# Patient Record
Sex: MALE | Race: OTHER | Employment: FULL TIME | URBAN - METROPOLITAN AREA
[De-identification: names, ages, dates, MRNs, and addresses within clinical notes are randomized per-mention and may not be internally consistent; named-entity substitution may affect disease eponyms.]

---

## 2017-01-25 ENCOUNTER — TELEPHONE (OUTPATIENT)
Dept: INTERNAL MEDICINE CLINIC | Facility: CLINIC | Age: 67
End: 2017-01-25

## 2017-01-25 DIAGNOSIS — E11.9 TYPE 2 DIABETES MELLITUS WITHOUT COMPLICATION, WITHOUT LONG-TERM CURRENT USE OF INSULIN (HCC): Primary | ICD-10-CM

## 2017-01-25 NOTE — TELEPHONE ENCOUNTER
Pt asked that I speak with his wife. Per wife, she states pt's fasting blood sugar has been 150-160 for the past few weeks. I also spoke with the patient, he states his normal fasting is around 130, he states he is asymptomatic.   He would like some labs

## 2017-01-26 NOTE — TELEPHONE ENCOUNTER
I spoke to the pt's wife. She states that the pt's work schedule has changed and he is off on Fridays now. First available appt on Fri is not until the end of Feb and wife is concerned that pt needs to be seen sooner.   I tentatively booked pt at 02/03 (f

## 2017-01-27 NOTE — TELEPHONE ENCOUNTER
Wife was contacted and appt was confirmed for Friday 02/03 at 9:40am at the [facility]

## 2017-02-01 ENCOUNTER — LAB ENCOUNTER (OUTPATIENT)
Dept: LAB | Facility: HOSPITAL | Age: 67
End: 2017-02-01
Attending: INTERNAL MEDICINE
Payer: COMMERCIAL

## 2017-02-01 DIAGNOSIS — K76.0 FATTY LIVER: ICD-10-CM

## 2017-02-01 LAB
ALBUMIN SERPL BCP-MCNC: 4.2 G/DL (ref 3.5–4.8)
ALBUMIN/GLOB SERPL: 1.3 {RATIO} (ref 1–2)
ALP SERPL-CCNC: 53 U/L (ref 32–100)
ALT SERPL-CCNC: 70 U/L (ref 17–63)
ANION GAP SERPL CALC-SCNC: 10 MMOL/L (ref 0–18)
AST SERPL-CCNC: 64 U/L (ref 15–41)
BILIRUB DIRECT SERPL-MCNC: 0.1 MG/DL (ref 0–0.2)
BILIRUB SERPL-MCNC: 1.2 MG/DL (ref 0.3–1.2)
BUN SERPL-MCNC: 11 MG/DL (ref 8–20)
BUN/CREAT SERPL: 12.4 (ref 10–20)
CALCIUM SERPL-MCNC: 9.3 MG/DL (ref 8.5–10.5)
CHLORIDE SERPL-SCNC: 102 MMOL/L (ref 95–110)
CO2 SERPL-SCNC: 26 MMOL/L (ref 22–32)
CREAT SERPL-MCNC: 0.89 MG/DL (ref 0.5–1.5)
GLOBULIN PLAS-MCNC: 3.2 G/DL (ref 2.5–3.7)
GLUCOSE SERPL-MCNC: 165 MG/DL (ref 70–99)
HBA1C MFR BLD: 6.6 % (ref 4–6)
OSMOLALITY UR CALC.SUM OF ELEC: 289 MOSM/KG (ref 275–295)
POTASSIUM SERPL-SCNC: 4.2 MMOL/L (ref 3.3–5.1)
PROT SERPL-MCNC: 7.4 G/DL (ref 5.9–8.4)
SODIUM SERPL-SCNC: 138 MMOL/L (ref 136–144)

## 2017-02-01 PROCEDURE — 82248 BILIRUBIN DIRECT: CPT

## 2017-02-01 PROCEDURE — 36415 COLL VENOUS BLD VENIPUNCTURE: CPT

## 2017-02-01 PROCEDURE — 83036 HEMOGLOBIN GLYCOSYLATED A1C: CPT | Performed by: INTERNAL MEDICINE

## 2017-02-01 PROCEDURE — 80053 COMPREHEN METABOLIC PANEL: CPT | Performed by: INTERNAL MEDICINE

## 2017-02-03 ENCOUNTER — OFFICE VISIT (OUTPATIENT)
Dept: INTERNAL MEDICINE CLINIC | Facility: CLINIC | Age: 67
End: 2017-02-03

## 2017-02-03 VITALS
SYSTOLIC BLOOD PRESSURE: 118 MMHG | DIASTOLIC BLOOD PRESSURE: 78 MMHG | TEMPERATURE: 98 F | HEART RATE: 80 BPM | BODY MASS INDEX: 22.57 KG/M2 | RESPIRATION RATE: 18 BRPM | WEIGHT: 157.63 LBS | HEIGHT: 70 IN

## 2017-02-03 DIAGNOSIS — I10 ESSENTIAL HYPERTENSION WITH GOAL BLOOD PRESSURE LESS THAN 140/90: ICD-10-CM

## 2017-02-03 DIAGNOSIS — E11.9 TYPE 2 DIABETES MELLITUS WITHOUT COMPLICATION, WITHOUT LONG-TERM CURRENT USE OF INSULIN (HCC): Primary | ICD-10-CM

## 2017-02-03 DIAGNOSIS — R74.8 ELEVATED LIVER ENZYMES: ICD-10-CM

## 2017-02-03 PROCEDURE — 99214 OFFICE O/P EST MOD 30 MIN: CPT | Performed by: INTERNAL MEDICINE

## 2017-02-03 PROCEDURE — 99212 OFFICE O/P EST SF 10 MIN: CPT | Performed by: INTERNAL MEDICINE

## 2017-02-03 RX ORDER — LISINOPRIL 10 MG/1
10 TABLET ORAL
Qty: 90 TABLET | Refills: 3 | Status: SHIPPED | OUTPATIENT
Start: 2017-02-03 | End: 2017-05-22

## 2017-02-03 RX ORDER — GLIPIZIDE 5 MG/1
5 TABLET, FILM COATED, EXTENDED RELEASE ORAL 2 TIMES DAILY
Qty: 180 TABLET | Refills: 3 | Status: SHIPPED | OUTPATIENT
Start: 2017-02-03 | End: 2017-05-22

## 2017-02-03 NOTE — PROGRESS NOTES
HPI:    Patient ID: Maren Juarez is a 78year old male. HPI  Patient here for follow-up on chronic medical issues.   Seen in November for earwax removal.  He was seen in September for general physical exam.  Blood sugars have been somewhat more elev Negative for joint pain. Skin: Negative for rash. Neurological: Negative for weakness, numbness and headaches. Hematological: Does not bruise/bleed easily. Psychiatric/Behavioral: Negative for depressed mood. The patient is not nervous/anxious. Abdominal: Soft. Bowel sounds are normal. He exhibits no mass. There is no tenderness. Musculoskeletal: He exhibits no tenderness. Lymphadenopathy:     He has no cervical adenopathy. Neurological: He is alert. Skin: Skin is warm and dry.  No rash

## 2017-02-18 ENCOUNTER — TELEPHONE (OUTPATIENT)
Dept: INTERNAL MEDICINE CLINIC | Facility: CLINIC | Age: 67
End: 2017-02-18

## 2017-02-18 DIAGNOSIS — E11.9 TYPE 2 DIABETES MELLITUS WITHOUT COMPLICATION, WITHOUT LONG-TERM CURRENT USE OF INSULIN (HCC): Primary | ICD-10-CM

## 2017-02-18 NOTE — TELEPHONE ENCOUNTER
----- Message from Tia Hernandez MD sent at 2/18/2017  9:00 AM CST -----  Regarding: Notification of Unviewed Test Results  Contact: [phone]  Please call. Patient has not viewed the results and message sent on [employer].  Please go over the results and

## 2017-02-18 NOTE — TELEPHONE ENCOUNTER
Spoke with patient's wife who is on HIPPA (identified name and ), results reviewed and agrees with plan. Will check on the results more often    Spoke with patien's wife who is on HIPPA (identified name and ), results reviewed and agrees with plan.

## 2017-02-22 NOTE — TELEPHONE ENCOUNTER
Please contact the patient. Referral placed for dietitian to discuss diabetes and fatty liver. Probably best done through the diabetes education center.

## 2017-03-10 ENCOUNTER — OFFICE VISIT (OUTPATIENT)
Dept: NUTRITION | Facility: HOSPITAL | Age: 67
End: 2017-03-10
Attending: INTERNAL MEDICINE
Payer: COMMERCIAL

## 2017-03-10 DIAGNOSIS — E11.9 TYPE 2 DIABETES MELLITUS WITHOUT COMPLICATION, WITHOUT LONG-TERM CURRENT USE OF INSULIN (HCC): ICD-10-CM

## 2017-03-10 DIAGNOSIS — E11.9 TYPE 2 DIABETES MELLITUS (HCC): Primary | ICD-10-CM

## 2017-03-10 PROCEDURE — 97802 MEDICAL NUTRITION INDIV IN: CPT

## 2017-03-10 NOTE — PROGRESS NOTES
ADULT INITIAL OUTPATIENT NUTRITION CONSULTATION    Nutrition Assessment    Medical Diagnosis: Diabetes    PMH: Diabetes    Client Hx: 78year old    Meds: reviewed    Labs (date): Glucose    ANTHROPOMETRICS:  Ht: 5' 10\"  Wt: 157    BMI: 22.61    Current

## 2017-05-02 ENCOUNTER — TELEPHONE (OUTPATIENT)
Dept: INTERNAL MEDICINE CLINIC | Facility: CLINIC | Age: 67
End: 2017-05-02

## 2017-05-02 DIAGNOSIS — E11.8 TYPE 2 DIABETES MELLITUS WITH COMPLICATION, UNSPECIFIED LONG TERM INSULIN USE STATUS: Primary | ICD-10-CM

## 2017-05-02 NOTE — TELEPHONE ENCOUNTER
Per pt's wife, wants Monday appt with Dr. Yojana Duong for 3 month follow up visit for diabetes and fatty liver. Offered 6/26, pt's wife declined, stated needed appt sooner.

## 2017-05-05 NOTE — TELEPHONE ENCOUNTER
Wife called to follow up,   Scheduled appt for 5/22  Wife requesting order for routine labs ( A1C, kidney, Lipid panel)

## 2017-05-09 NOTE — TELEPHONE ENCOUNTER
Spoke with patient's wife advised her lab order in system. Wife advised patient will need to fast. Appoint confirmed on 5/22/17 at 11:40am with Marta Jimenez. Wife voiced her understanding of this.

## 2017-05-22 ENCOUNTER — APPOINTMENT (OUTPATIENT)
Dept: LAB | Facility: HOSPITAL | Age: 67
End: 2017-05-22
Attending: INTERNAL MEDICINE
Payer: COMMERCIAL

## 2017-05-22 ENCOUNTER — OFFICE VISIT (OUTPATIENT)
Dept: INTERNAL MEDICINE CLINIC | Facility: CLINIC | Age: 67
End: 2017-05-22

## 2017-05-22 VITALS
BODY MASS INDEX: 22.24 KG/M2 | SYSTOLIC BLOOD PRESSURE: 102 MMHG | WEIGHT: 155.31 LBS | DIASTOLIC BLOOD PRESSURE: 68 MMHG | RESPIRATION RATE: 18 BRPM | HEART RATE: 74 BPM | HEIGHT: 70 IN | TEMPERATURE: 98 F

## 2017-05-22 DIAGNOSIS — E11.8 TYPE 2 DIABETES MELLITUS WITH COMPLICATION, UNSPECIFIED LONG TERM INSULIN USE STATUS: ICD-10-CM

## 2017-05-22 DIAGNOSIS — G89.29 CHRONIC RIGHT SHOULDER PAIN: ICD-10-CM

## 2017-05-22 DIAGNOSIS — R74.8 ELEVATED LIVER ENZYMES: ICD-10-CM

## 2017-05-22 DIAGNOSIS — E11.8 TYPE 2 DIABETES MELLITUS WITH COMPLICATION, UNSPECIFIED LONG TERM INSULIN USE STATUS: Primary | ICD-10-CM

## 2017-05-22 DIAGNOSIS — M25.511 CHRONIC RIGHT SHOULDER PAIN: ICD-10-CM

## 2017-05-22 DIAGNOSIS — H92.02 LEFT EAR PAIN: ICD-10-CM

## 2017-05-22 DIAGNOSIS — I10 ESSENTIAL HYPERTENSION WITH GOAL BLOOD PRESSURE LESS THAN 140/90: ICD-10-CM

## 2017-05-22 DIAGNOSIS — K76.0 FATTY LIVER: ICD-10-CM

## 2017-05-22 DIAGNOSIS — D23.5 BENIGN NEOPLASM OF SKIN OF TRUNK: ICD-10-CM

## 2017-05-22 PROCEDURE — 99212 OFFICE O/P EST SF 10 MIN: CPT | Performed by: INTERNAL MEDICINE

## 2017-05-22 PROCEDURE — 99214 OFFICE O/P EST MOD 30 MIN: CPT | Performed by: INTERNAL MEDICINE

## 2017-05-22 PROCEDURE — 80053 COMPREHEN METABOLIC PANEL: CPT

## 2017-05-22 PROCEDURE — 83036 HEMOGLOBIN GLYCOSYLATED A1C: CPT

## 2017-05-22 PROCEDURE — 36415 COLL VENOUS BLD VENIPUNCTURE: CPT

## 2017-05-22 PROCEDURE — 80061 LIPID PANEL: CPT

## 2017-05-22 RX ORDER — LISINOPRIL 10 MG/1
10 TABLET ORAL
Qty: 90 TABLET | Refills: 3 | Status: SHIPPED | OUTPATIENT
Start: 2017-05-22 | End: 2018-04-17

## 2017-05-22 RX ORDER — GLIPIZIDE 5 MG/1
5 TABLET, FILM COATED, EXTENDED RELEASE ORAL 2 TIMES DAILY
Qty: 180 TABLET | Refills: 3 | Status: SHIPPED | OUTPATIENT
Start: 2017-05-22 | End: 2018-04-17

## 2017-05-22 NOTE — PROGRESS NOTES
HPI:    Patient ID: Ricky Storm is a 78year old male. HPI  Patient is here for follow-up on chronic medical issues as listed below. Last seen here 3 months ago.   Recent issues with increased liver function test.  Negative testing for hepatitis a Cardiovascular: Negative for chest pain and palpitations. Gastrointestinal: Negative for nausea, vomiting, abdominal pain, diarrhea and constipation. Genitourinary: Negative for dysuria, hematuria and sexual dysfunction.    Musculoskeletal: Positive f clear and moist.   Eyes: Pupils are equal, round, and reactive to light. Cardiovascular: Normal rate, regular rhythm, normal heart sounds and intact distal pulses. Exam reveals no gallop and no friction rub. No murmur heard. Edema not present.   Pul of the trunk. Refer to dermatology for evaluation and possible removal.    (H92.02) Left ear pain  Plan: Intermittent ear pain. Exam is normal.  Monitor for now.       Meds This Visit:  Pending Prescriptions Disp Refills    MetFORMIN HCl 1000 MG Oral Tab

## 2017-06-02 ENCOUNTER — TELEPHONE (OUTPATIENT)
Dept: INTERNAL MEDICINE CLINIC | Facility: CLINIC | Age: 67
End: 2017-06-02

## 2017-06-02 NOTE — TELEPHONE ENCOUNTER
Patient and wife contacted and informed of results as in patient result comments entered by [employer] on 5/29/17. Copy of results mailed to patients home address listed in EMR.

## 2017-06-26 ENCOUNTER — APPOINTMENT (OUTPATIENT)
Dept: LAB | Facility: HOSPITAL | Age: 67
End: 2017-06-26
Attending: DERMATOLOGY
Payer: COMMERCIAL

## 2017-06-26 ENCOUNTER — APPOINTMENT (OUTPATIENT)
Dept: LAB | Age: 67
End: 2017-06-26
Attending: DERMATOLOGY
Payer: COMMERCIAL

## 2017-06-26 ENCOUNTER — OFFICE VISIT (OUTPATIENT)
Dept: DERMATOLOGY CLINIC | Facility: CLINIC | Age: 67
End: 2017-06-26

## 2017-06-26 DIAGNOSIS — D23.5 BENIGN NEOPLASM OF SKIN OF TRUNK, EXCEPT SCROTUM: ICD-10-CM

## 2017-06-26 DIAGNOSIS — D23.30 BENIGN NEOPLASM OF SKIN OF FACE: ICD-10-CM

## 2017-06-26 DIAGNOSIS — D23.4 BENIGN NEOPLASM OF SCALP AND SKIN OF NECK: ICD-10-CM

## 2017-06-26 DIAGNOSIS — L82.1 SEBORRHEIC KERATOSES: ICD-10-CM

## 2017-06-26 DIAGNOSIS — D48.5 NEOPLASM OF UNCERTAIN BEHAVIOR OF SKIN: Primary | ICD-10-CM

## 2017-06-26 PROCEDURE — 11100 BIOPSY OF SKIN LESION: CPT | Performed by: DERMATOLOGY

## 2017-06-26 PROCEDURE — 88305 TISSUE EXAM BY PATHOLOGIST: CPT

## 2017-06-26 PROCEDURE — 99202 OFFICE O/P NEW SF 15 MIN: CPT | Performed by: DERMATOLOGY

## 2017-06-29 NOTE — PROGRESS NOTES
The pathology report from last visit showed    Seb ker. irritated. Please log in test results, send biopsy results letter. Pt to rtc 1 year or prn.

## 2017-07-16 NOTE — PROGRESS NOTES
Donnie Cardenas is a 78year old male. HPI:     CC:  Patient presents with:  Lesion: New pt presenting with lesion to L flank. Pt denies personal and family hx of skin cancer.         Allergies:  Penicillins    HISTORY:    Past Medical History:   Diagnos Unknown    Past Medical History:   Diagnosis Date   • Type II or unspecified type diabetes mellitus without mention of complication, not stated as uncontrolled      History reviewed. No pertinent surgical history.     Social History  Social History scattered on exam. pigmented lesions examined with dermoscopy benign-appearing patterns. Waxy tannish keratotic papules scattered, cherry-red vascular papules scattered. See map today's date for lesions noted .   1.5 cm crusted dark brown inflamed pl

## 2017-08-21 ENCOUNTER — TELEPHONE (OUTPATIENT)
Dept: INTERNAL MEDICINE CLINIC | Facility: CLINIC | Age: 67
End: 2017-08-21

## 2017-08-21 DIAGNOSIS — Z12.5 SCREENING FOR PROSTATE CANCER: Primary | ICD-10-CM

## 2017-08-21 DIAGNOSIS — E11.9 TYPE 2 DIABETES MELLITUS WITHOUT COMPLICATION, UNSPECIFIED LONG TERM INSULIN USE STATUS: ICD-10-CM

## 2017-08-21 NOTE — TELEPHONE ENCOUNTER
Pt's wife asking to speak with a nurse regarding diabetic lab orders and the reason why pt had to cancel today's appointment. Please advise.

## 2017-08-21 NOTE — TELEPHONE ENCOUNTER
PATIENT/WIFE CONTACTED REQUESTING ORDERS FOR LAB PRIOR TO 10/16/17 FOLLOW UP APPOINTMENT WITH YOU, PLEASE ADVISE.

## 2017-09-12 ENCOUNTER — TELEPHONE (OUTPATIENT)
Dept: INTERNAL MEDICINE CLINIC | Facility: CLINIC | Age: 67
End: 2017-09-12

## 2017-09-12 NOTE — TELEPHONE ENCOUNTER
Wife stts pt is out of test strips.       Glucose Blood (ONETOUCH ULTRA BLUE) In Vitro Strip Test by Intradermal route 2 times every day Disp: 100 strip Rfl: 5

## 2017-10-02 ENCOUNTER — LAB ENCOUNTER (OUTPATIENT)
Dept: LAB | Facility: HOSPITAL | Age: 67
End: 2017-10-02
Attending: INTERNAL MEDICINE
Payer: COMMERCIAL

## 2017-10-02 DIAGNOSIS — Z12.5 SCREENING FOR PROSTATE CANCER: ICD-10-CM

## 2017-10-02 DIAGNOSIS — E11.9 TYPE 2 DIABETES MELLITUS WITHOUT COMPLICATION, UNSPECIFIED LONG TERM INSULIN USE STATUS: ICD-10-CM

## 2017-10-02 PROCEDURE — 82607 VITAMIN B-12: CPT

## 2017-10-02 PROCEDURE — 85025 COMPLETE CBC W/AUTO DIFF WBC: CPT

## 2017-10-02 PROCEDURE — 80061 LIPID PANEL: CPT

## 2017-10-02 PROCEDURE — 82570 ASSAY OF URINE CREATININE: CPT

## 2017-10-02 PROCEDURE — 80053 COMPREHEN METABOLIC PANEL: CPT

## 2017-10-02 PROCEDURE — 82043 UR ALBUMIN QUANTITATIVE: CPT

## 2017-10-02 PROCEDURE — 83036 HEMOGLOBIN GLYCOSYLATED A1C: CPT

## 2017-10-02 PROCEDURE — 84443 ASSAY THYROID STIM HORMONE: CPT

## 2017-10-02 PROCEDURE — 36415 COLL VENOUS BLD VENIPUNCTURE: CPT

## 2017-10-16 ENCOUNTER — OFFICE VISIT (OUTPATIENT)
Dept: INTERNAL MEDICINE CLINIC | Facility: CLINIC | Age: 67
End: 2017-10-16

## 2017-10-16 ENCOUNTER — APPOINTMENT (OUTPATIENT)
Dept: LAB | Facility: HOSPITAL | Age: 67
End: 2017-10-16
Attending: INTERNAL MEDICINE
Payer: COMMERCIAL

## 2017-10-16 VITALS
BODY MASS INDEX: 21.86 KG/M2 | SYSTOLIC BLOOD PRESSURE: 104 MMHG | RESPIRATION RATE: 18 BRPM | HEART RATE: 84 BPM | HEIGHT: 70 IN | TEMPERATURE: 97 F | DIASTOLIC BLOOD PRESSURE: 64 MMHG | WEIGHT: 152.69 LBS

## 2017-10-16 DIAGNOSIS — E11.9 TYPE 2 DIABETES MELLITUS WITHOUT COMPLICATION, WITHOUT LONG-TERM CURRENT USE OF INSULIN (HCC): ICD-10-CM

## 2017-10-16 DIAGNOSIS — R74.8 ELEVATED LIVER ENZYMES: ICD-10-CM

## 2017-10-16 DIAGNOSIS — E11.9 TYPE 2 DIABETES MELLITUS WITHOUT COMPLICATION, WITHOUT LONG-TERM CURRENT USE OF INSULIN (HCC): Primary | ICD-10-CM

## 2017-10-16 DIAGNOSIS — I10 ESSENTIAL HYPERTENSION WITH GOAL BLOOD PRESSURE LESS THAN 140/90: ICD-10-CM

## 2017-10-16 DIAGNOSIS — R79.89 ABNORMAL THYROID BLOOD TEST: ICD-10-CM

## 2017-10-16 PROCEDURE — 84439 ASSAY OF FREE THYROXINE: CPT

## 2017-10-16 PROCEDURE — 36415 COLL VENOUS BLD VENIPUNCTURE: CPT

## 2017-10-16 PROCEDURE — 99214 OFFICE O/P EST MOD 30 MIN: CPT | Performed by: INTERNAL MEDICINE

## 2017-10-16 PROCEDURE — 84481 FREE ASSAY (FT-3): CPT

## 2017-10-16 PROCEDURE — 99212 OFFICE O/P EST SF 10 MIN: CPT | Performed by: INTERNAL MEDICINE

## 2017-10-16 NOTE — PROGRESS NOTES
HPI:    Patient ID: Paloma Grandchild is a 78year old male. HPI  Patient is here for follow-up on chronic medical problems as listed below. Last seen here in May 22. Overall he was doing well at that time.   He had some mild increase in liver function Positive for cough. Negative for shortness of breath. Cardiovascular: Negative for chest pain and palpitations. Gastrointestinal: Positive for constipation. Negative for nausea, vomiting, abdominal pain and diarrhea.    Genitourinary: Negative for dysu Pupils are equal, round, and reactive to light. Cardiovascular: Normal rate, regular rhythm, normal heart sounds and intact distal pulses. Exam reveals no gallop and no friction rub. No murmur heard. Edema not present.   Pulmonary/Chest: Effort norm not apply Misc 100 each 5      Sig: test by intradermal route twice a day           Imaging & Referrals:  None         #[id number]

## 2017-12-27 ENCOUNTER — OFFICE VISIT (OUTPATIENT)
Dept: OPHTHALMOLOGY | Facility: CLINIC | Age: 67
End: 2017-12-27

## 2017-12-27 ENCOUNTER — TELEPHONE (OUTPATIENT)
Dept: OPHTHALMOLOGY | Facility: CLINIC | Age: 67
End: 2017-12-27

## 2017-12-27 DIAGNOSIS — H11.31 SUBCONJUNCTIVAL HEMORRHAGE OF RIGHT EYE: Primary | ICD-10-CM

## 2017-12-27 PROCEDURE — 99213 OFFICE O/P EST LOW 20 MIN: CPT | Performed by: OPHTHALMOLOGY

## 2017-12-27 PROCEDURE — 99212 OFFICE O/P EST SF 10 MIN: CPT | Performed by: OPHTHALMOLOGY

## 2017-12-27 NOTE — TELEPHONE ENCOUNTER
OV booked at 1:00 today with [facility]. Pt woke up in the middle of the night with right eye pain and redness. Pt states that the pain subsided but still has slight redness. Pt would like to be seen today.

## 2017-12-27 NOTE — TELEPHONE ENCOUNTER
Pts spouse states pt woke up with R eye very red today, states pain has gone away but is concerned, asking if pt can be seen today. Pls advise thank you.

## 2017-12-27 NOTE — PATIENT INSTRUCTIONS
Subconjunctival hemorrhage of right eye  Discussed diagnosis with patient. No treatment is needed at this time. Patient was reassured that there is no scratch, infection, foreign body or inflammation in the eye.   Told  patient that this may take up to  1

## 2017-12-27 NOTE — PROGRESS NOTES
Gustavo East is a 75year old male. HPI:     HPI     Pt called today stating that he woke up in the middle of the night due to right eye pain 10/10 and redness OD.  Pt states that today his pain is less 2/10 OD and the redness has gotten worse nasal Strip Test blood sugar twice a day. Disp: 200 each Rfl: 0   Aspirin (ASPIR-81 OR) Take 1 tablet by mouth daily. Disp:  Rfl:    Blood Glucose Monitoring Suppl ([address]   [address]) W/DEVICE Does not apply Kit apply 1 by Misc. (Non-Drug; Combo Route) route, information  given. No orders of the defined types were placed in this encounter. Meds This Visit:    No prescriptions requested or ordered in this encounter     Follow up instructions:  Return for N/A  Diabetic Eye Exam with Dr. Sherrie Chin.     12/

## 2017-12-27 NOTE — ASSESSMENT & PLAN NOTE
Discussed diagnosis with patient. No treatment is needed at this time. Patient was reassured that there is no scratch, infection, foreign body or inflammation in the eye. Told  patient that this may take up to  1-2 weeks to resolve.   Patient denies any

## 2018-03-24 ENCOUNTER — TELEPHONE (OUTPATIENT)
Dept: INTERNAL MEDICINE CLINIC | Facility: CLINIC | Age: 68
End: 2018-03-24

## 2018-03-24 DIAGNOSIS — E11.9 TYPE 2 DIABETES MELLITUS WITHOUT COMPLICATION, WITHOUT LONG-TERM CURRENT USE OF INSULIN (HCC): Primary | ICD-10-CM

## 2018-03-24 DIAGNOSIS — R79.89 ABNORMAL THYROID BLOOD TEST: ICD-10-CM

## 2018-03-24 NOTE — TELEPHONE ENCOUNTER
Pt's wife called in requesting to have pt's 6 month f/u lab orders placed on to his chart prior to his appt scheduled for 4/17.

## 2018-04-01 ENCOUNTER — APPOINTMENT (OUTPATIENT)
Dept: LAB | Facility: HOSPITAL | Age: 68
End: 2018-04-01
Attending: INTERNAL MEDICINE
Payer: COMMERCIAL

## 2018-04-01 DIAGNOSIS — E11.9 TYPE 2 DIABETES MELLITUS WITHOUT COMPLICATION, WITHOUT LONG-TERM CURRENT USE OF INSULIN (HCC): ICD-10-CM

## 2018-04-01 DIAGNOSIS — R79.89 ABNORMAL THYROID BLOOD TEST: ICD-10-CM

## 2018-04-01 PROCEDURE — 84439 ASSAY OF FREE THYROXINE: CPT

## 2018-04-01 PROCEDURE — 84443 ASSAY THYROID STIM HORMONE: CPT

## 2018-04-01 PROCEDURE — 36415 COLL VENOUS BLD VENIPUNCTURE: CPT

## 2018-04-01 PROCEDURE — 80061 LIPID PANEL: CPT

## 2018-04-01 PROCEDURE — 84481 FREE ASSAY (FT-3): CPT

## 2018-04-01 PROCEDURE — 83036 HEMOGLOBIN GLYCOSYLATED A1C: CPT

## 2018-04-01 PROCEDURE — 82947 ASSAY GLUCOSE BLOOD QUANT: CPT

## 2018-04-14 ENCOUNTER — OFFICE VISIT (OUTPATIENT)
Dept: OPTOMETRY | Facility: CLINIC | Age: 68
End: 2018-04-14

## 2018-04-14 DIAGNOSIS — E11.9 TYPE 2 DIABETES MELLITUS WITHOUT COMPLICATION, WITHOUT LONG-TERM CURRENT USE OF INSULIN (HCC): Primary | ICD-10-CM

## 2018-04-14 DIAGNOSIS — H25.13 AGE-RELATED NUCLEAR CATARACT OF BOTH EYES: ICD-10-CM

## 2018-04-14 DIAGNOSIS — H52.4 PRESBYOPIA: ICD-10-CM

## 2018-04-14 PROCEDURE — 92014 COMPRE OPH EXAM EST PT 1/>: CPT | Performed by: OPTOMETRIST

## 2018-04-14 PROCEDURE — 92015 DETERMINE REFRACTIVE STATE: CPT | Performed by: OPTOMETRIST

## 2018-04-14 NOTE — PROGRESS NOTES
Jesus Gallegos is a 75year old male. HPI:     HPI     Diabetic Eye Exam   Diabetes characteristics include Type 2, controlled with diet and taking oral medications. Duration of 17 years.            Comments   Patient is in for an annual diabetic eye INTRADERMAL ROUTE TWICE A DAY Disp: 100 each Rfl: 5   Glucose Blood (ONETOUCH TEST) In Vitro Strip Test blood sugar twice a day. Disp: 200 each Rfl: 0   Aspirin (ASPIR-81 OR) Take 1 tablet by mouth daily.  Disp:  Rfl:    Blood Glucose Monitoring Suppl (ONET Macula Normal no BDR Normal no BDR    Vessels Normal Normal    Periphery Normal Normal            Refraction     Wearing Rx       Sphere Cylinder Axis Add    Right +1.75 -0.75 175 +2.25    Left +1.25 Sphere  +2.25    Type:  Progressive bifocal          Man

## 2018-04-14 NOTE — PATIENT INSTRUCTIONS
Presbyopia  New glasses RX given to update as needed. Senile cataract  No treatment is required. Will continue to observe.     Type II diabetes mellitus ([facility] [address]  I advised patient that there is no background diabetic retinopathy in either eye and that th

## 2018-04-17 ENCOUNTER — OFFICE VISIT (OUTPATIENT)
Dept: INTERNAL MEDICINE CLINIC | Facility: CLINIC | Age: 68
End: 2018-04-17

## 2018-04-17 VITALS
DIASTOLIC BLOOD PRESSURE: 78 MMHG | WEIGHT: 160 LBS | RESPIRATION RATE: 18 BRPM | BODY MASS INDEX: 22.9 KG/M2 | TEMPERATURE: 98 F | HEART RATE: 88 BPM | SYSTOLIC BLOOD PRESSURE: 126 MMHG | HEIGHT: 70 IN

## 2018-04-17 DIAGNOSIS — E11.9 TYPE 2 DIABETES MELLITUS WITHOUT COMPLICATION, WITHOUT LONG-TERM CURRENT USE OF INSULIN (HCC): Primary | ICD-10-CM

## 2018-04-17 DIAGNOSIS — R79.89 ABNORMAL THYROID BLOOD TEST: ICD-10-CM

## 2018-04-17 DIAGNOSIS — M54.5 LOW BACK PAIN, UNSPECIFIED BACK PAIN LATERALITY, UNSPECIFIED CHRONICITY, WITH SCIATICA PRESENCE UNSPECIFIED: ICD-10-CM

## 2018-04-17 DIAGNOSIS — I10 ESSENTIAL HYPERTENSION WITH GOAL BLOOD PRESSURE LESS THAN 140/90: ICD-10-CM

## 2018-04-17 DIAGNOSIS — Z23 NEED FOR VACCINATION: ICD-10-CM

## 2018-04-17 PROCEDURE — 90670 PCV13 VACCINE IM: CPT | Performed by: INTERNAL MEDICINE

## 2018-04-17 PROCEDURE — 99214 OFFICE O/P EST MOD 30 MIN: CPT | Performed by: INTERNAL MEDICINE

## 2018-04-17 PROCEDURE — 99212 OFFICE O/P EST SF 10 MIN: CPT | Performed by: INTERNAL MEDICINE

## 2018-04-17 PROCEDURE — 90471 IMMUNIZATION ADMIN: CPT | Performed by: INTERNAL MEDICINE

## 2018-04-17 RX ORDER — GLIPIZIDE 5 MG/1
5 TABLET, FILM COATED, EXTENDED RELEASE ORAL 2 TIMES DAILY
Qty: 180 TABLET | Refills: 3 | Status: SHIPPED | OUTPATIENT
Start: 2018-04-17 | End: 2018-10-18

## 2018-04-17 RX ORDER — LISINOPRIL 10 MG/1
10 TABLET ORAL
Qty: 90 TABLET | Refills: 3 | Status: SHIPPED | OUTPATIENT
Start: 2018-04-17 | End: 2018-10-18

## 2018-04-17 NOTE — PROGRESS NOTES
HPI:    Patient ID: Gracie Jameson is a 75year old male. HPI  Patient is here for follow-up on chronic medical issues as listed below. In October. Things are well controlled at that time.   Previously elevated liver function tests have returned to Negative for visual disturbance. Respiratory: Negative for cough and shortness of breath. Cardiovascular: Negative for chest pain and palpitations. Gastrointestinal: Negative for nausea, vomiting, abdominal pain, diarrhea and constipation.    Genitou Cardiovascular: Normal rate, regular rhythm, normal heart sounds and intact distal pulses. Exam reveals no gallop and no friction rub. No murmur heard. Edema not present. Pulmonary/Chest: Effort normal and breath sounds normal. He has no wheezes. Referrals:  None         ID#[id number]

## 2018-07-25 ENCOUNTER — OFFICE VISIT (OUTPATIENT)
Dept: INTERNAL MEDICINE CLINIC | Facility: CLINIC | Age: 68
End: 2018-07-25
Payer: COMMERCIAL

## 2018-07-25 ENCOUNTER — NURSE TRIAGE (OUTPATIENT)
Dept: OTHER | Age: 68
End: 2018-07-25

## 2018-07-25 VITALS
SYSTOLIC BLOOD PRESSURE: 113 MMHG | WEIGHT: 157 LBS | DIASTOLIC BLOOD PRESSURE: 73 MMHG | BODY MASS INDEX: 22.48 KG/M2 | RESPIRATION RATE: 16 BRPM | HEIGHT: 70 IN | HEART RATE: 65 BPM

## 2018-07-25 DIAGNOSIS — R10.10 UPPER ABDOMINAL PAIN: Primary | ICD-10-CM

## 2018-07-25 PROCEDURE — 99212 OFFICE O/P EST SF 10 MIN: CPT | Performed by: INTERNAL MEDICINE

## 2018-07-25 PROCEDURE — 99214 OFFICE O/P EST MOD 30 MIN: CPT | Performed by: INTERNAL MEDICINE

## 2018-07-25 NOTE — TELEPHONE ENCOUNTER
Action Requested: Summary for Provider     []  Critical Lab, Recommendations Needed  [x] Need Additional Advice  []   FYI    []   Need Orders  [] Need Medications Sent to Pharmacy  []  Other     SUMMARY: Appt scheduled for today 7/25/18 at 2:50pm with

## 2018-07-25 NOTE — TELEPHONE ENCOUNTER
Okay to wait for the visit this afternoon. Patient should call back or proceed to ER if problem worsens.

## 2018-07-25 NOTE — TELEPHONE ENCOUNTER
Advised patient's wife on Dr. Jennifer Buckley information and recommendations. She verbalized understanding.

## 2018-07-25 NOTE — PROGRESS NOTES
William Tapia is a 75year old male. Patient presents with:  Abdominal Pain: yesterday #10 pain. It happened after eating. No nausea, diarrhea. Burping.  Dizziness this am.    HPI:   Mr. Zac Salinas presents this afternoon, accompanied by his wife, for LUIS ANTONIO LANCETS 22A Does not apply Misc USE BY INTRADERMAL ROUTE TWICE A DAY Disp: 100 each Rfl: 5   Aspirin (ASPIR-81 OR) Take 1 tablet by mouth daily.  Disp:  Rfl:    Blood Glucose Monitoring Suppl ([address]   [address]) W/DEVICE Does not apply Kit apply asked to return as needed.     Bianca Anglin MD  7/25/2018  3:27 PM

## 2018-07-26 LAB
ABSOLUTE BASOPHILS: 27 CELLS/UL (ref 0–200)
ABSOLUTE EOSINOPHILS: 270 CELLS/UL (ref 15–500)
ABSOLUTE LYMPHOCYTES: 3060 CELLS/UL (ref 850–3900)
ABSOLUTE MONOCYTES: 684 CELLS/UL (ref 200–950)
ABSOLUTE NEUTROPHILS: 4959 CELLS/UL (ref 1500–7800)
ALBUMIN/GLOBULIN RATIO: 1.7 (CALC) (ref 1–2.5)
ALBUMIN: 4.2 G/DL (ref 3.6–5.1)
ALKALINE PHOSPHATASE: 50 U/L (ref 40–115)
ALT: 36 U/L (ref 9–46)
APPEARANCE: CLEAR
AST: 26 U/L (ref 10–35)
BASOPHILS: 0.3 %
BILIRUBIN, TOTAL: 0.6 MG/DL (ref 0.2–1.2)
BILIRUBIN: NEGATIVE
BUN: 12 MG/DL (ref 7–25)
CALCIUM: 9.1 MG/DL (ref 8.6–10.3)
CARBON DIOXIDE: 28 MMOL/L (ref 20–31)
CHLORIDE: 103 MMOL/L (ref 98–110)
COLOR: YELLOW
CREATININE: 0.88 MG/DL (ref 0.7–1.25)
EGFR IF AFRICN AM: 102 ML/MIN/1.73M2
EGFR IF NONAFRICN AM: 88 ML/MIN/1.73M2
EOSINOPHILS: 3 %
GLOBULIN: 2.5 G/DL (CALC) (ref 1.9–3.7)
GLUCOSE: 128 MG/DL (ref 65–139)
GLUCOSE: NEGATIVE
HEMATOCRIT: 38.4 % (ref 38.5–50)
HEMOGLOBIN: 13.6 G/DL (ref 13.2–17.1)
KETONES: NEGATIVE
LEUKOCYTE ESTERASE: NEGATIVE
LIPASE: 30 U/L (ref 7–60)
LYMPHOCYTES: 34 %
MCH: 31.7 PG (ref 27–33)
MCHC: 35.4 G/DL (ref 32–36)
MCV: 89.5 FL (ref 80–100)
MONOCYTES: 7.6 %
MPV: 9.8 FL (ref 7.5–12.5)
NEUTROPHILS: 55.1 %
NITRITE: NEGATIVE
OCCULT BLOOD: NEGATIVE
PH: 6.5 (ref 5–8)
PLATELET COUNT: 217 THOUSAND/UL (ref 140–400)
POTASSIUM: 4.2 MMOL/L (ref 3.5–5.3)
PROTEIN, TOTAL: 6.7 G/DL (ref 6.1–8.1)
PROTEIN: NEGATIVE
RDW: 12.8 % (ref 11–15)
RED BLOOD CELL COUNT: 4.29 MILLION/UL (ref 4.2–5.8)
SODIUM: 139 MMOL/L (ref 135–146)
SPECIFIC GRAVITY: 1.01 (ref 1–1.03)
WHITE BLOOD CELL COUNT: 9 THOUSAND/UL (ref 3.8–10.8)

## 2018-08-09 ENCOUNTER — TELEPHONE (OUTPATIENT)
Dept: OTHER | Age: 68
End: 2018-08-09

## 2018-08-09 NOTE — TELEPHONE ENCOUNTER
Patient Result Comments     Written by Ginette Vaughan MD on 7/26/2018  1:27 PM   Your chemistry profile, blood count, lipase level (a pancreas enzyme) and urinalysis are all normal.  I await your gallbladder ultrasound.

## 2018-08-09 NOTE — TELEPHONE ENCOUNTER
Patient's wife notified of lab results. Patient's wife verbalizes understanding. Patient's wife stated patient is scheduled for gallbladder ultrasound on 08/11/18.

## 2018-08-11 ENCOUNTER — HOSPITAL ENCOUNTER (OUTPATIENT)
Dept: ULTRASOUND IMAGING | Facility: HOSPITAL | Age: 68
Discharge: HOME | End: 2018-08-11
Attending: INTERNAL MEDICINE
Payer: COMMERCIAL

## 2018-08-11 DIAGNOSIS — R10.10 UPPER ABDOMINAL PAIN: ICD-10-CM

## 2018-08-11 PROCEDURE — 76705 ECHO EXAM OF ABDOMEN: CPT | Performed by: INTERNAL MEDICINE

## 2018-09-21 ENCOUNTER — TELEPHONE (OUTPATIENT)
Dept: OTHER | Age: 68
End: 2018-09-21

## 2018-09-21 DIAGNOSIS — Z00.00 ROUTINE PHYSICAL EXAMINATION: Primary | ICD-10-CM

## 2018-09-21 NOTE — TELEPHONE ENCOUNTER
Patient's wife calling to inform Dr. Green Or that patient stopped taking baby aspirin on Tuesday. Per wife, patient watched the news and it was reported that baby aspirin is harming people more than helping per the latest research.    As a result, patient

## 2018-09-25 NOTE — TELEPHONE ENCOUNTER
It is okay if the patient is off of aspirin for right now. He is scheduled to see me in middle of October. We can discuss it at length there.

## 2018-09-25 NOTE — TELEPHONE ENCOUNTER
Please see patient request for [facility] labs to complete prior to 10/18/18 appt.     Please respond to pool: VALENTIN BAUM CFLAY LPN/VIDAL

## 2018-09-25 NOTE — TELEPHONE ENCOUNTER
Dr Marina Pitts, please advise. Advised patient on Dr. Alyson Garvey information and recommendations. Patient verbalized understanding. Asked about lab work needed before the physical, must be [facility] now, wanted A1c, tests for fatty liver, others.  He goes to [facility]

## 2018-09-27 NOTE — TELEPHONE ENCOUNTER
Patient contacted and informed ,Lab orders faxed to patients home address listed in EMR as requested.

## 2018-10-18 ENCOUNTER — OFFICE VISIT (OUTPATIENT)
Dept: INTERNAL MEDICINE CLINIC | Facility: CLINIC | Age: 68
End: 2018-10-18
Payer: COMMERCIAL

## 2018-10-18 VITALS
HEIGHT: 70 IN | HEART RATE: 76 BPM | SYSTOLIC BLOOD PRESSURE: 102 MMHG | DIASTOLIC BLOOD PRESSURE: 68 MMHG | BODY MASS INDEX: 22.53 KG/M2 | RESPIRATION RATE: 18 BRPM | WEIGHT: 157.38 LBS | TEMPERATURE: 98 F

## 2018-10-18 DIAGNOSIS — Z23 NEED FOR VACCINATION: ICD-10-CM

## 2018-10-18 DIAGNOSIS — I10 ESSENTIAL HYPERTENSION WITH GOAL BLOOD PRESSURE LESS THAN 140/90: ICD-10-CM

## 2018-10-18 DIAGNOSIS — R79.89 ABNORMAL THYROID BLOOD TEST: ICD-10-CM

## 2018-10-18 DIAGNOSIS — E11.9 TYPE 2 DIABETES MELLITUS WITHOUT COMPLICATION, WITHOUT LONG-TERM CURRENT USE OF INSULIN (HCC): ICD-10-CM

## 2018-10-18 DIAGNOSIS — Z00.00 ROUTINE PHYSICAL EXAMINATION: Primary | ICD-10-CM

## 2018-10-18 DIAGNOSIS — M54.5 LOW BACK PAIN, UNSPECIFIED BACK PAIN LATERALITY, UNSPECIFIED CHRONICITY, WITH SCIATICA PRESENCE UNSPECIFIED: ICD-10-CM

## 2018-10-18 PROCEDURE — 99397 PER PM REEVAL EST PAT 65+ YR: CPT | Performed by: INTERNAL MEDICINE

## 2018-10-18 PROCEDURE — 90653 IIV ADJUVANT VACCINE IM: CPT | Performed by: INTERNAL MEDICINE

## 2018-10-18 PROCEDURE — 90471 IMMUNIZATION ADMIN: CPT | Performed by: INTERNAL MEDICINE

## 2018-10-18 RX ORDER — GLIPIZIDE 5 MG/1
5 TABLET, FILM COATED, EXTENDED RELEASE ORAL 2 TIMES DAILY
Qty: 180 TABLET | Refills: 3 | Status: SHIPPED | OUTPATIENT
Start: 2018-10-18 | End: 2019-07-24

## 2018-10-18 RX ORDER — LANCETS 33 GAUGE
EACH MISCELLANEOUS
Qty: 100 EACH | Refills: 5 | Status: SHIPPED | OUTPATIENT
Start: 2018-10-18 | End: 2019-01-02

## 2018-10-18 RX ORDER — LISINOPRIL 10 MG/1
10 TABLET ORAL
Qty: 90 TABLET | Refills: 3 | Status: SHIPPED | OUTPATIENT
Start: 2018-10-18 | End: 2019-07-24

## 2018-10-18 RX ORDER — ATORVASTATIN CALCIUM 10 MG/1
10 TABLET, FILM COATED ORAL NIGHTLY
Qty: 30 TABLET | Refills: 5 | Status: SHIPPED | OUTPATIENT
Start: 2018-10-18 | End: 2019-02-11

## 2018-10-18 NOTE — PROGRESS NOTES
HPI:    Patient ID: Neeraj Tidwell is a 75year old male. HPI  Patient is here requesting a physical exam and follow-up on chronic medical issues as listed below. Last seen here in March. No changes made at that time.   He saw different internal med Never Used    Alcohol use: No    Drug use: No         Review of Systems   Constitutional: Negative for chills, fatigue and fever. HENT: Negative for hearing loss. Eyes: Negative for visual disturbance.    Respiratory: Negative for cough and shortness o light.   Neck: No thyromegaly present. Cardiovascular: Normal rate, regular rhythm, normal heart sounds and intact distal pulses. Exam reveals no gallop and no friction rub. No murmur heard. Edema not present.   Pulmonary/Chest: Effort normal and br hypertension with goal blood pressure less than 140/90  Blood pressure is well controlled. Continue current medications.     4. Abnormal thyroid blood test  Stable mildly elevated TSH with normal free T3 and free T4.    5. Low back pain, unspecified back p

## 2019-01-02 ENCOUNTER — TELEPHONE (OUTPATIENT)
Dept: INTERNAL MEDICINE CLINIC | Facility: CLINIC | Age: 69
End: 2019-01-02

## 2019-01-02 DIAGNOSIS — E11.9 TYPE 2 DIABETES MELLITUS WITHOUT COMPLICATION, WITHOUT LONG-TERM CURRENT USE OF INSULIN (HCC): Primary | ICD-10-CM

## 2019-01-02 NOTE — TELEPHONE ENCOUNTER
[facility]:  Patient requests new rx: please send script for Accu-Test Strip and Microlet lancets insurance no longer covering [employer]

## 2019-01-03 RX ORDER — LANCETS 33 GAUGE
EACH MISCELLANEOUS
Qty: 200 EACH | Refills: 3 | Status: SHIPPED | OUTPATIENT
Start: 2019-01-03 | End: 2019-12-05

## 2019-01-03 NOTE — TELEPHONE ENCOUNTER
Pharmacy requesting Meter and supplies.  Please advise    Diabetes Medications  Protocol Criteria:  · Appointment scheduled in the past 6 months or the next 3 months  · A1C < 7.5 in the past 6 months  · Creatinine in the past 12 months  · Creatinine result

## 2019-01-14 NOTE — TELEPHONE ENCOUNTER
Pharmacy states that insurance will only cover accu chek products so needs new prescription for meter and all testing supplies.

## 2019-02-11 RX ORDER — ATORVASTATIN CALCIUM 10 MG/1
10 TABLET, FILM COATED ORAL NIGHTLY
Qty: 90 TABLET | Refills: 0 | Status: SHIPPED | OUTPATIENT
Start: 2019-02-11 | End: 2019-05-15

## 2019-02-11 NOTE — TELEPHONE ENCOUNTER
[facility] day supply refill request for:    •  atorvastatin 10 MG Oral Tab, Take 1 tablet (10 mg total) by mouth nightly., Disp: 30 tablet, Rfl: 5

## 2019-02-12 NOTE — TELEPHONE ENCOUNTER
Refill passed per [facility], [facility] protocol.   Cholesterol Medications  Protocol Criteria:  · Appointment scheduled in the past 12 months or in the next 3 months  · ALT & LDL on file in the past 12 months  · ALT result < 80  · LDL result <130   Recent Outpat

## 2019-04-25 ENCOUNTER — OFFICE VISIT (OUTPATIENT)
Dept: INTERNAL MEDICINE CLINIC | Facility: CLINIC | Age: 69
End: 2019-04-25
Payer: COMMERCIAL

## 2019-04-25 VITALS
DIASTOLIC BLOOD PRESSURE: 58 MMHG | WEIGHT: 150 LBS | HEIGHT: 70 IN | BODY MASS INDEX: 21.47 KG/M2 | RESPIRATION RATE: 18 BRPM | TEMPERATURE: 98 F | SYSTOLIC BLOOD PRESSURE: 94 MMHG | HEART RATE: 72 BPM

## 2019-04-25 DIAGNOSIS — I10 ESSENTIAL HYPERTENSION WITH GOAL BLOOD PRESSURE LESS THAN 140/90: ICD-10-CM

## 2019-04-25 DIAGNOSIS — R79.89 ABNORMAL THYROID BLOOD TEST: ICD-10-CM

## 2019-04-25 DIAGNOSIS — E11.9 TYPE 2 DIABETES MELLITUS WITHOUT COMPLICATION, WITHOUT LONG-TERM CURRENT USE OF INSULIN (HCC): Primary | ICD-10-CM

## 2019-04-25 PROCEDURE — 99213 OFFICE O/P EST LOW 20 MIN: CPT | Performed by: INTERNAL MEDICINE

## 2019-04-25 PROCEDURE — 99212 OFFICE O/P EST SF 10 MIN: CPT | Performed by: INTERNAL MEDICINE

## 2019-04-25 NOTE — PROGRESS NOTES
HPI:    Patient ID: Jaime Smith is a 70year old male. HPI  Patient is here for follow-up on chronic medical issues as listed below. Last seen here in October. At that time we started a Lipitor medication.   Has not seen any other doctor since th shortness of breath. Cardiovascular: Negative for chest pain and palpitations. Gastrointestinal: Negative for nausea, vomiting, abdominal pain, diarrhea and constipation. Genitourinary: Negative for dysuria, hematuria and sexual dysfunction.    [facility] Abdominal: Soft. Bowel sounds are normal. He exhibits no mass. There is no tenderness. Musculoskeletal: He exhibits no tenderness. Lymphadenopathy:     He has no cervical adenopathy. Neurological: He is alert. Skin: Skin is warm and dry.  No rash

## 2019-05-15 RX ORDER — ATORVASTATIN CALCIUM 10 MG/1
TABLET, FILM COATED ORAL
Qty: 90 TABLET | Refills: 1 | Status: SHIPPED | OUTPATIENT
Start: 2019-05-15 | End: 2019-11-04

## 2019-05-15 NOTE — TELEPHONE ENCOUNTER
Refill passed per [facility], [facility] protocol.     Requested Prescriptions   Pending Prescriptions Disp Refills   • ATORVASTATIN 10 MG Oral Tab [Pharmacy Med Name: ATORVASTATIN 10 MG TABLET] 90 tablet 0     Sig: TAKE 1 TABLET BY MOUTH EVERY DAY AT NIGHT

## 2019-05-22 ENCOUNTER — NURSE TRIAGE (OUTPATIENT)
Dept: OTHER | Age: 69
End: 2019-05-22

## 2019-05-22 NOTE — TELEPHONE ENCOUNTER
Action Requested: Summary for Provider     []  Critical Lab, Recommendations Needed  [x] Need Additional Advice  []   FYI    []   Need Orders  [] Need Medications Sent to Pharmacy  []  Other     SUMMARY: Spouse called indicated that patient told her yester

## 2019-05-23 ENCOUNTER — OFFICE VISIT (OUTPATIENT)
Dept: INTERNAL MEDICINE CLINIC | Facility: CLINIC | Age: 69
End: 2019-05-23
Payer: COMMERCIAL

## 2019-05-23 VITALS
SYSTOLIC BLOOD PRESSURE: 100 MMHG | HEART RATE: 74 BPM | DIASTOLIC BLOOD PRESSURE: 64 MMHG | RESPIRATION RATE: 20 BRPM | HEIGHT: 70 IN | WEIGHT: 155.88 LBS | BODY MASS INDEX: 22.32 KG/M2 | TEMPERATURE: 98 F

## 2019-05-23 DIAGNOSIS — J06.9 UPPER RESPIRATORY TRACT INFECTION, UNSPECIFIED TYPE: Primary | ICD-10-CM

## 2019-05-23 PROCEDURE — 99212 OFFICE O/P EST SF 10 MIN: CPT | Performed by: INTERNAL MEDICINE

## 2019-05-23 NOTE — PROGRESS NOTES
HPI:    Patient ID: Agnes Bahena is a 70year old male. HPI  Patient is here with upper respiratory symptoms. Started about a week ago with throat pain at night as well as a cough.   Took some over-the-counter cough suppressant with some improvemen lisinopril 10 MG Oral Tab Take 1 tablet (10 mg total) by mouth once daily. Disp: 90 tablet Rfl: 3   GlipiZIDE ER 5 MG Oral Tablet 24 Hr Take 1 tablet (5 mg total) by mouth 2 (two) times daily.  Disp: 180 tablet Rfl: 3   Blood Glucose Monitoring Suppl (ONE

## 2019-05-23 NOTE — TELEPHONE ENCOUNTER
Wife (MARYCARMEN) calling to follow up regarding her OV request to Dr Diallo Zavala for the patient , advised that PCP did not response yet,states that symptoms are still the same from yesterday ,with dry cough and neck pain, oferred OV with different provider but decl

## 2019-05-23 NOTE — TELEPHONE ENCOUNTER
Advised spouse of Dr. Santiago Ray note. Spouse verbalized understanding. Appointment made for today at [address] [facility] with Dr Kale Garza at the [place] [address].

## 2019-07-16 ENCOUNTER — OFFICE VISIT (OUTPATIENT)
Dept: OPHTHALMOLOGY | Facility: CLINIC | Age: 69
End: 2019-07-16
Payer: COMMERCIAL

## 2019-07-16 DIAGNOSIS — H25.13 AGE-RELATED NUCLEAR CATARACT OF BOTH EYES: ICD-10-CM

## 2019-07-16 DIAGNOSIS — E11.9 TYPE 2 DIABETES MELLITUS WITHOUT RETINOPATHY (HCC): Primary | ICD-10-CM

## 2019-07-16 PROBLEM — H11.31 SUBCONJUNCTIVAL HEMORRHAGE OF RIGHT EYE: Status: RESOLVED | Noted: 2017-12-27 | Resolved: 2019-07-16

## 2019-07-16 PROCEDURE — 92015 DETERMINE REFRACTIVE STATE: CPT | Performed by: OPHTHALMOLOGY

## 2019-07-16 PROCEDURE — 92014 COMPRE OPH EXAM EST PT 1/>: CPT | Performed by: OPHTHALMOLOGY

## 2019-07-16 NOTE — PATIENT INSTRUCTIONS
Type 2 diabetes mellitus without retinopathy ([facility] [address]  Diabetes type II: no background of retinopathy, no signs of neovascularization noted. Discussed ocular and systemic benefits of blood sugar control.   Diagnosis and treatment discussed in detail with marybeth

## 2019-07-16 NOTE — PROGRESS NOTES
Gustavo East is a 70year old male.     HPI:     HPI     Diabetic Eye Exam      Additional comments: Pt has been a diabetic for 15 years  15 years on pills/  0 years on Insulin   Pt checks his BS 3x a week   Pt's last blood sugar was 130  Last HA1C was Misc.(Non-Drug; Combo Route) route, test by intradermal route twice a day Disp:  Rfl:        Allergies:    Penicillins             UNKNOWN    ROS:     ROS     Positive for: Endocrine, Eyes    Negative for: Constitutional, Gastrointestinal, Neurological, Sk +0. 50 Sphere  20/20 +2.75 20/20          Final Rx       Sphere Cylinder [place] [facility] Add [place]    Right +1.00 +0.75 090 20/20 +2.50 20/20    Left +0.50 Sphere  20/20 +2.50 20/20    Type:  Progressive bifocal                 ASSESSMENT/PLAN:     Diagnoses

## 2019-07-24 ENCOUNTER — OFFICE VISIT (OUTPATIENT)
Dept: INTERNAL MEDICINE CLINIC | Facility: CLINIC | Age: 69
End: 2019-07-24
Payer: COMMERCIAL

## 2019-07-24 VITALS
RESPIRATION RATE: 20 BRPM | TEMPERATURE: 98 F | HEART RATE: 70 BPM | SYSTOLIC BLOOD PRESSURE: 98 MMHG | DIASTOLIC BLOOD PRESSURE: 62 MMHG | WEIGHT: 151.88 LBS | HEIGHT: 70 IN | BODY MASS INDEX: 21.74 KG/M2

## 2019-07-24 DIAGNOSIS — Z23 NEED FOR VACCINATION: ICD-10-CM

## 2019-07-24 DIAGNOSIS — E11.9 TYPE 2 DIABETES MELLITUS WITHOUT COMPLICATION, WITHOUT LONG-TERM CURRENT USE OF INSULIN (HCC): Primary | ICD-10-CM

## 2019-07-24 DIAGNOSIS — I10 ESSENTIAL HYPERTENSION WITH GOAL BLOOD PRESSURE LESS THAN 140/90: ICD-10-CM

## 2019-07-24 DIAGNOSIS — R79.89 ABNORMAL THYROID BLOOD TEST: ICD-10-CM

## 2019-07-24 PROCEDURE — 90471 IMMUNIZATION ADMIN: CPT | Performed by: INTERNAL MEDICINE

## 2019-07-24 PROCEDURE — 99214 OFFICE O/P EST MOD 30 MIN: CPT | Performed by: INTERNAL MEDICINE

## 2019-07-24 PROCEDURE — 90732 PPSV23 VACC 2 YRS+ SUBQ/IM: CPT | Performed by: INTERNAL MEDICINE

## 2019-07-24 RX ORDER — LISINOPRIL 10 MG/1
10 TABLET ORAL
Qty: 90 TABLET | Refills: 3 | Status: SHIPPED | OUTPATIENT
Start: 2019-07-24 | End: 2020-02-05

## 2019-07-24 RX ORDER — GLIPIZIDE 5 MG/1
5 TABLET, FILM COATED, EXTENDED RELEASE ORAL 2 TIMES DAILY
Qty: 180 TABLET | Refills: 3 | Status: SHIPPED | OUTPATIENT
Start: 2019-07-24 | End: 2020-02-05

## 2019-07-24 NOTE — PROGRESS NOTES
HPI:    Patient ID: Johnny Gray is a 70year old male.     HPI  Patient returns to the office today to discuss chronic medical issues which include Patient Active Problem List:     Type II diabetes mellitus ([facility] [address]     Essential hypertension     Presbyo Review of Systems   Constitutional: Negative for chills, fatigue and fever. HENT: Negative for hearing loss. Eyes: Negative for visual disturbance. Respiratory: Negative for cough and shortness of breath.     Cardiovascular: Negative for ch distal pulses. Exam reveals no gallop and no friction rub. No murmur heard. Edema not present. Pulmonary/Chest: Effort normal and breath sounds normal. He has no wheezes. He has no rales. Abdominal: Soft.  Bowel sounds are normal. He exhibits no ma

## 2019-09-16 ENCOUNTER — NURSE TRIAGE (OUTPATIENT)
Dept: INTERNAL MEDICINE CLINIC | Facility: CLINIC | Age: 69
End: 2019-09-16

## 2019-09-16 ENCOUNTER — TELEPHONE (OUTPATIENT)
Dept: INTERNAL MEDICINE CLINIC | Facility: CLINIC | Age: 69
End: 2019-09-16

## 2019-09-16 DIAGNOSIS — E11.9 TYPE 2 DIABETES MELLITUS WITHOUT COMPLICATION, WITHOUT LONG-TERM CURRENT USE OF INSULIN (HCC): Primary | ICD-10-CM

## 2019-09-16 NOTE — TELEPHONE ENCOUNTER
On 9/15 patient's wife states  was weak and sweaty with a blood sugar of 60 at 9:30 pm. Patient then ate cereal to which patient felt better, but did not recheck sugar level.     Wife complaining that  hardly eats anything due to GERD like sym

## 2019-09-16 NOTE — TELEPHONE ENCOUNTER
Spouse states that pt is having problem digesting his food. Per spouse the patient is not feeling hungry and then last night the patient's blood sugar went down to 60. Transferred call to RN.

## 2019-09-16 NOTE — TELEPHONE ENCOUNTER
Pt has an appt with Dr. Jose Key on 10-28-19  for his 3 month follow up. Spouse would like to know if the doctor can put order for him to do his blood work prior to his appointments.

## 2019-09-17 NOTE — TELEPHONE ENCOUNTER
Patient's spouse (MARYCARMEN on file) informed of provider's response/recommendations below and agrees. Spouse states the appt scheduled for 10/25/19 is meant to be a diabetes follow up appt.

## 2019-09-17 NOTE — TELEPHONE ENCOUNTER
Patient should try Prilosec OTC 1 a day for the reflux symptoms. Let me know if things are going in the next couple of weeks. He can take the melatonin if he wants. That should be safe as well.   In regards to the blood testing for the October visit, ple

## 2019-09-20 NOTE — TELEPHONE ENCOUNTER
Orders were generated and note was sent back to the nurses to contact the patient. This was done in a separate entry under nurse triage.

## 2019-09-20 NOTE — TELEPHONE ENCOUNTER
Contact pt. Lab order generated for labs to be done at [address] [place] [address]. They are 12 hour fasting.

## 2019-10-25 ENCOUNTER — HOSPITAL ENCOUNTER (OUTPATIENT)
Dept: GENERAL RADIOLOGY | Facility: HOSPITAL | Age: 69
Discharge: HOME | End: 2019-10-25
Attending: INTERNAL MEDICINE
Payer: COMMERCIAL

## 2019-10-25 ENCOUNTER — TELEPHONE (OUTPATIENT)
Dept: INTERNAL MEDICINE CLINIC | Facility: CLINIC | Age: 69
End: 2019-10-25

## 2019-10-25 ENCOUNTER — OFFICE VISIT (OUTPATIENT)
Dept: INTERNAL MEDICINE CLINIC | Facility: CLINIC | Age: 69
End: 2019-10-25
Payer: COMMERCIAL

## 2019-10-25 VITALS
WEIGHT: 146.81 LBS | DIASTOLIC BLOOD PRESSURE: 68 MMHG | SYSTOLIC BLOOD PRESSURE: 114 MMHG | HEART RATE: 80 BPM | TEMPERATURE: 98 F | HEIGHT: 70 IN | RESPIRATION RATE: 18 BRPM | BODY MASS INDEX: 21.02 KG/M2

## 2019-10-25 DIAGNOSIS — R63.4 WEIGHT LOSS, NON-INTENTIONAL: ICD-10-CM

## 2019-10-25 DIAGNOSIS — Z23 NEED FOR VACCINATION: ICD-10-CM

## 2019-10-25 DIAGNOSIS — I10 ESSENTIAL HYPERTENSION WITH GOAL BLOOD PRESSURE LESS THAN 140/90: ICD-10-CM

## 2019-10-25 DIAGNOSIS — E11.9 TYPE 2 DIABETES MELLITUS WITHOUT COMPLICATION, WITHOUT LONG-TERM CURRENT USE OF INSULIN (HCC): Primary | ICD-10-CM

## 2019-10-25 DIAGNOSIS — R10.9 ABDOMINAL PAIN, UNSPECIFIED ABDOMINAL LOCATION: ICD-10-CM

## 2019-10-25 PROCEDURE — 99214 OFFICE O/P EST MOD 30 MIN: CPT | Performed by: INTERNAL MEDICINE

## 2019-10-25 PROCEDURE — 90662 IIV NO PRSV INCREASED AG IM: CPT | Performed by: INTERNAL MEDICINE

## 2019-10-25 PROCEDURE — 90471 IMMUNIZATION ADMIN: CPT | Performed by: INTERNAL MEDICINE

## 2019-10-25 PROCEDURE — 71046 X-RAY EXAM CHEST 2 VIEWS: CPT | Performed by: INTERNAL MEDICINE

## 2019-10-25 NOTE — PROGRESS NOTES
HPI:    Patient ID: Katherine Bearden is a 70year old male. HPI  Patient is here for follow-up on chronic medical issues as listed below. Last seen here in late July. A1c was 6.9. He has blood work ordered but he has not done it yet.   He has lost a dysfunction. Musculoskeletal: Negative for joint pain. Skin: Negative for rash. Neurological: Negative for weakness, numbness and headaches. Hematological: Does not bruise/bleed easily. Psychiatric/Behavioral: Negative for depressed mood.  The pat exhibits no mass. There is no tenderness. Musculoskeletal: He exhibits no tenderness. Lymphadenopathy:     He has no cervical adenopathy. Neurological: He is alert. Skin: Skin is warm and dry. No rash noted.    Psychiatric: He has a normal mood and

## 2019-10-25 NOTE — TELEPHONE ENCOUNTER
Patient's wife calling back with member services from patient's insurance company. Per member services our office needs to contact [phone] or [phone] for pre-authorization for CT scan.    Member services informed patient's wife that approval may

## 2019-10-25 NOTE — TELEPHONE ENCOUNTER
Spoke with spouse (MARYCARMEN and  verified)---reports CXR completed today (no results as of yet)  and CT abdomen scheduled for tomorrow.     Wife was told patient needs to get authorization for testing--relayed to wife message below listed on orders--she verba

## 2019-10-26 ENCOUNTER — HOSPITAL ENCOUNTER (OUTPATIENT)
Dept: CT IMAGING | Age: 69
Discharge: HOME | End: 2019-10-26
Attending: INTERNAL MEDICINE
Payer: COMMERCIAL

## 2019-10-26 DIAGNOSIS — R10.9 ABDOMINAL PAIN, UNSPECIFIED ABDOMINAL LOCATION: ICD-10-CM

## 2019-10-26 DIAGNOSIS — R63.4 WEIGHT LOSS, NON-INTENTIONAL: ICD-10-CM

## 2019-10-26 PROCEDURE — 82565 ASSAY OF CREATININE: CPT

## 2019-10-26 PROCEDURE — 74177 CT ABD & PELVIS W/CONTRAST: CPT | Performed by: INTERNAL MEDICINE

## 2019-10-26 NOTE — TELEPHONE ENCOUNTER
Patient's wife requesting authorization number for the CT. Provided number on CT order. Patient's wife stated she would go through [facility] as they already have an appointment set for today and the cost is $100 for the copay.   Patient stat

## 2019-10-29 ENCOUNTER — TELEPHONE (OUTPATIENT)
Dept: OTHER | Age: 69
End: 2019-10-29

## 2019-10-29 NOTE — TELEPHONE ENCOUNTER
Spouse call request test results. HIPPA verified. CT and Xray results given. Please advise regarding lab results.  TSH abnormal.

## 2019-10-29 NOTE — TELEPHONE ENCOUNTER
Please inform patient since T4 is normal his thyroid is functioning. We will monitor TSH.     AIRAM Presley  Working with Dr. Schuyler Haines Discharged

## 2019-10-30 NOTE — TELEPHONE ENCOUNTER
Informed patient's wife of results per JUSTINE Mancera. Scheduled office visit for follow up on 2/5/20. Wife asking for labs to be in system beforehand.

## 2019-10-30 NOTE — TELEPHONE ENCOUNTER
Spoke with Jin Hickey (wife-MARYCARMEN) and informed that all blood tests order are in the computer now, advised to tell patient to do 12 hour fasting for it,verbalized understanding.

## 2019-10-31 NOTE — TELEPHONE ENCOUNTER
Spoke with pt's wife Joseluis Callaway and states she understood that the pt needs to do all the test before his appt on 2/5/2020. Wife is requesting to mail lab orders and states pt needs to go to [address] [place] [address] for insurance to cover the tests.      Noted all test ordered t

## 2019-10-31 NOTE — TELEPHONE ENCOUNTER
The blood tests that were entered into the system yesterday by the nurse practitioner are not to be done now. They were ordered to be done prior to his next exam which is in February. He should not get these tests done now. He just had test done.   Sherley

## 2019-11-04 RX ORDER — ATORVASTATIN CALCIUM 10 MG/1
TABLET, FILM COATED ORAL
Qty: 90 TABLET | Refills: 1 | Status: SHIPPED | OUTPATIENT
Start: 2019-11-04 | End: 2020-02-05

## 2019-12-04 ENCOUNTER — TELEPHONE (OUTPATIENT)
Dept: INTERNAL MEDICINE CLINIC | Facility: CLINIC | Age: 69
End: 2019-12-04

## 2019-12-04 NOTE — TELEPHONE ENCOUNTER
.  Current Outpatient Medications   Medication Sig Dispense Refill   • ATORVASTATIN 10 MG Oral Tab TAKE 1 TABLET BY MOUTH EVERY DAY AT NIGHT 90 tablet 1   • lisinopril 10 MG Oral Tab Take 1 tablet (10 mg total) by mouth once daily. 90 tablet 3     .   Dc Orourke

## 2019-12-05 RX ORDER — LISINOPRIL 10 MG/1
10 TABLET ORAL
Qty: 90 TABLET | Refills: 3 | OUTPATIENT
Start: 2019-12-05

## 2019-12-05 RX ORDER — LANCETS 33 GAUGE
EACH MISCELLANEOUS
Qty: 200 EACH | Refills: 3 | Status: SHIPPED | OUTPATIENT
Start: 2019-12-05 | End: 2020-02-05

## 2019-12-05 RX ORDER — GLIPIZIDE 5 MG/1
5 TABLET, FILM COATED, EXTENDED RELEASE ORAL 2 TIMES DAILY
Qty: 180 TABLET | Refills: 3 | OUTPATIENT
Start: 2019-12-05

## 2019-12-05 RX ORDER — ATORVASTATIN CALCIUM 10 MG/1
TABLET, FILM COATED ORAL
Qty: 90 TABLET | Refills: 1 | OUTPATIENT
Start: 2019-12-05

## 2019-12-06 NOTE — TELEPHONE ENCOUNTER
Refill passed per [facility] protocol.     Diabetic Supplies  Protocol Criteria:  · Appointment scheduled in past 12 months or the next 3 months

## 2020-02-05 ENCOUNTER — OFFICE VISIT (OUTPATIENT)
Dept: INTERNAL MEDICINE CLINIC | Facility: CLINIC | Age: 70
End: 2020-02-05
Payer: COMMERCIAL

## 2020-02-05 VITALS
RESPIRATION RATE: 18 BRPM | DIASTOLIC BLOOD PRESSURE: 72 MMHG | BODY MASS INDEX: 21.22 KG/M2 | TEMPERATURE: 98 F | HEART RATE: 74 BPM | SYSTOLIC BLOOD PRESSURE: 112 MMHG | HEIGHT: 70 IN | WEIGHT: 148.19 LBS

## 2020-02-05 DIAGNOSIS — E11.9 TYPE 2 DIABETES MELLITUS WITHOUT COMPLICATION, WITHOUT LONG-TERM CURRENT USE OF INSULIN (HCC): Primary | ICD-10-CM

## 2020-02-05 DIAGNOSIS — R63.4 WEIGHT LOSS, NON-INTENTIONAL: ICD-10-CM

## 2020-02-05 DIAGNOSIS — I10 ESSENTIAL HYPERTENSION WITH GOAL BLOOD PRESSURE LESS THAN 140/90: ICD-10-CM

## 2020-02-05 PROCEDURE — 99214 OFFICE O/P EST MOD 30 MIN: CPT | Performed by: INTERNAL MEDICINE

## 2020-02-05 RX ORDER — GLIPIZIDE 5 MG/1
5 TABLET, FILM COATED, EXTENDED RELEASE ORAL 2 TIMES DAILY
Qty: 180 TABLET | Refills: 3 | Status: SHIPPED | OUTPATIENT
Start: 2020-02-05 | End: 2020-03-23

## 2020-02-05 RX ORDER — ATORVASTATIN CALCIUM 10 MG/1
TABLET, FILM COATED ORAL
Qty: 90 TABLET | Refills: 3 | Status: SHIPPED | OUTPATIENT
Start: 2020-02-05 | End: 2020-03-23

## 2020-02-05 RX ORDER — LISINOPRIL 10 MG/1
10 TABLET ORAL
Qty: 90 TABLET | Refills: 3 | Status: SHIPPED | OUTPATIENT
Start: 2020-02-05 | End: 2020-03-23

## 2020-02-05 RX ORDER — LANCETS 33 GAUGE
EACH MISCELLANEOUS
Qty: 200 EACH | Refills: 3 | Status: SHIPPED | OUTPATIENT
Start: 2020-02-05 | End: 2020-03-27

## 2020-02-05 NOTE — PROGRESS NOTES
HPI:    Patient ID: Chasity Gonzalez is a 78year old male. HPI  Patient here for follow-up on chronic medical issues as listed below. Last seen here in October. No changes made at that time.   We did a chest x-ray and CT of abdomen pelvis because of joint pain. Skin: Negative for rash. Neurological: Negative for weakness, numbness and headaches. Hematological: Does not bruise/bleed easily. Psychiatric/Behavioral: Negative for depressed mood. The patient is not nervous/anxious.              Beauford Flax Musculoskeletal: He exhibits no tenderness. Lymphadenopathy:     He has no cervical adenopathy. Neurological: He is alert. Skin: Skin is warm and dry. No rash noted. Psychiatric: He has a normal mood and affect.  Thought content normal.       Wt R Referrals:  None         Ramírez Rainey MD

## 2020-03-01 LAB — HEMOGLOBIN A1C: 6.2 % OF TOTAL HGB

## 2020-03-16 ENCOUNTER — TELEPHONE (OUTPATIENT)
Dept: INTERNAL MEDICINE CLINIC | Facility: CLINIC | Age: 70
End: 2020-03-16

## 2020-03-16 NOTE — TELEPHONE ENCOUNTER
Dr Edie Tidwell, please advise. Wife (MARYCARMEN) called for patient,  still going into work, but she and family are worried because he is in high risk age group, especially with his medical condition.  She is asking for a note for the patient for his employe

## 2020-03-16 NOTE — TELEPHONE ENCOUNTER
Wife calling again, asking that Dr. Guru Fisher write the note for her  today before 5 pm so he can take everything from work (computer, etc) to work from home starting tomorrow.   Wife asking for patient to be able to work from home through the end of t

## 2020-03-16 NOTE — TELEPHONE ENCOUNTER
Dr Li Ren, please advise. Wife (MARYCARMEN) asked about lab results.     Please respond to pool: [facility] LPN/CMA--please check for the results there, the A1c was there

## 2020-03-17 NOTE — TELEPHONE ENCOUNTER
Patient wife calling again, explained to her  Is not in clinic today but will see  the message tomorrow morning , also asking about lab results     . Wife verbalizes understanding of above

## 2020-03-17 NOTE — TELEPHONE ENCOUNTER
Patient's wife is calling to check on the status of the letter she is requesting. Instead of the letter stating til the end of the month. She is requesting to state until the coronavirus dies down.  She is worried about her  who works for [employer] and

## 2020-03-18 ENCOUNTER — TELEPHONE (OUTPATIENT)
Dept: OTHER | Age: 70
End: 2020-03-18

## 2020-03-18 NOTE — TELEPHONE ENCOUNTER
Pt spouse asking if Dr Kurt Damon has received labs from [employer] ordered on 10/30/19.   Routed to onsite to check for fax

## 2020-03-18 NOTE — TELEPHONE ENCOUNTER
Lab results received from [address] [place] [address] lab and placed on [facility] desk for review. Patient/wife contacted and informed.

## 2020-03-18 NOTE — TELEPHONE ENCOUNTER
The results that are on my desk are from October 26, 2019. Those results are already in the system and was sent out to the patient via [address] last October. .  The blood tests that were ordered on October 30 seem to be a repeat of the ones from October 26.

## 2020-03-18 NOTE — TELEPHONE ENCOUNTER
Spoke with pt spouse and she states she did not get letter,   See TE note 3/18/20  Letter was resent to pt.

## 2020-03-20 NOTE — TELEPHONE ENCOUNTER
Contacted patient states had labs completed on 2/29/20, Contacted [facility] requested results to be faxed to [facility] [phone] for MD review.

## 2020-03-23 ENCOUNTER — TELEPHONE (OUTPATIENT)
Dept: INTERNAL MEDICINE CLINIC | Facility: CLINIC | Age: 70
End: 2020-03-23

## 2020-03-23 RX ORDER — ATORVASTATIN CALCIUM 10 MG/1
TABLET, FILM COATED ORAL
Qty: 90 TABLET | Refills: 3 | Status: SHIPPED | OUTPATIENT
Start: 2020-03-23 | End: 2020-03-27

## 2020-03-23 RX ORDER — GLIPIZIDE 5 MG/1
5 TABLET, FILM COATED, EXTENDED RELEASE ORAL 2 TIMES DAILY
Qty: 180 TABLET | Refills: 3 | Status: SHIPPED | OUTPATIENT
Start: 2020-03-23 | End: 2020-03-27

## 2020-03-23 RX ORDER — LISINOPRIL 10 MG/1
10 TABLET ORAL
Qty: 90 TABLET | Refills: 3 | Status: SHIPPED | OUTPATIENT
Start: 2020-03-23 | End: 2020-03-27

## 2020-03-23 NOTE — TELEPHONE ENCOUNTER
Current Outpatient Medications   Medication Sig Dispense Refill   • atorvastatin 10 MG Oral Tab TAKE 1 TABLET BY MOUTH EVERY DAY AT NIGHT 90 tablet 3   • glipiZIDE ER 5 MG Oral Tablet 24 Hr Take 1 tablet (5 mg total) by mouth 2 (two) times daily.  180 table

## 2020-03-25 ENCOUNTER — TELEPHONE (OUTPATIENT)
Dept: INTERNAL MEDICINE CLINIC | Facility: CLINIC | Age: 70
End: 2020-03-25

## 2020-03-25 NOTE — TELEPHONE ENCOUNTER
Saumya Culp    Patient called with lab results. Lipid panel - excellent  Cholesterol 124  HDL 68-improved  Triglyceride 59  LDL 42    Glucose 128 - Reduce glucose in diet. Carbs. Hemoglobin A1C 6.2 was 6.3 last year. Patient is feeling well.     D

## 2020-03-27 ENCOUNTER — TELEPHONE (OUTPATIENT)
Dept: OTHER | Age: 70
End: 2020-03-27

## 2020-03-27 RX ORDER — ATORVASTATIN CALCIUM 10 MG/1
TABLET, FILM COATED ORAL
Qty: 90 TABLET | Refills: 3 | Status: SHIPPED | OUTPATIENT
Start: 2020-03-27 | End: 2020-06-22

## 2020-03-27 RX ORDER — LISINOPRIL 10 MG/1
10 TABLET ORAL
Qty: 90 TABLET | Refills: 3 | Status: SHIPPED | OUTPATIENT
Start: 2020-03-27 | End: 2021-02-03

## 2020-03-27 RX ORDER — GLIPIZIDE 5 MG/1
5 TABLET, FILM COATED, EXTENDED RELEASE ORAL 2 TIMES DAILY
Qty: 180 TABLET | Refills: 3 | Status: SHIPPED | OUTPATIENT
Start: 2020-03-27 | End: 2021-02-03

## 2020-03-27 RX ORDER — LANCETS 33 GAUGE
EACH MISCELLANEOUS
Qty: 200 EACH | Refills: 3 | Status: SHIPPED | OUTPATIENT
Start: 2020-03-27 | End: 2020-12-07

## 2020-03-27 NOTE — TELEPHONE ENCOUNTER
Wife (MARYCARMEN) calling and states that they received a letter form [facility] pharmacy that they are still waiting for all medications refill. Verified patient's name and .   Informed wife that medications were already approved but was  sent to the loc

## 2020-03-31 ENCOUNTER — TELEPHONE (OUTPATIENT)
Dept: INTERNAL MEDICINE CLINIC | Facility: CLINIC | Age: 70
End: 2020-03-31

## 2020-05-26 ENCOUNTER — NURSE TRIAGE (OUTPATIENT)
Dept: INTERNAL MEDICINE CLINIC | Facility: CLINIC | Age: 70
End: 2020-05-26

## 2020-05-27 ENCOUNTER — VIRTUAL PHONE E/M (OUTPATIENT)
Dept: INTERNAL MEDICINE CLINIC | Facility: CLINIC | Age: 70
End: 2020-05-27
Payer: COMMERCIAL

## 2020-05-27 DIAGNOSIS — G47.00 INSOMNIA, UNSPECIFIED TYPE: Primary | ICD-10-CM

## 2020-05-27 PROCEDURE — 99212 OFFICE O/P EST SF 10 MIN: CPT | Performed by: INTERNAL MEDICINE

## 2020-05-27 RX ORDER — ZOLPIDEM TARTRATE 10 MG/1
10 TABLET ORAL NIGHTLY PRN
Qty: 30 TABLET | Refills: 1 | Status: SHIPPED | OUTPATIENT
Start: 2020-05-27 | End: 2020-09-09

## 2020-05-27 NOTE — PROGRESS NOTES
HPI:    Patient ID: Didier Diamond is a 78year old male. Virtual/Telephone Check-In    Didier Diamond verbally consents to a Virtual/Telephone Check-In service on 05/27/20.   Patient has been referred to the [facility] website at [email or web address] Years since quittin.4      Smokeless tobacco: Never Used    Alcohol use: No    Drug use: No         Review of Systems         Current Outpatient Medications   Medication Sig Dispense Refill   • Zolpidem Tartrate 10 MG Oral Tab Take 1 tablet (10 mg tot only.  Warned of the potential side effects and addictive nature. Contact the office if not improving. May consider counseling or daily medication to treat his anxiety.     Approximately 8 minutes was spent with the patient on the phone in conversation ab

## 2020-06-01 ENCOUNTER — TELEPHONE (OUTPATIENT)
Dept: INTERNAL MEDICINE CLINIC | Facility: CLINIC | Age: 70
End: 2020-06-01

## 2020-06-01 NOTE — TELEPHONE ENCOUNTER
Patient's wife, Connie Glass on MARYCARMEN, called to inquire if patient's Metformin 1000 mg is part of the FDA recall. Wife cannot find 's name on the bottle of the prescription.  Advised she should call pharmacy that supplied 's Metformin and inqui
Will await Pharmacies request
Abdominal Pain, N/V/D

## 2020-06-17 ENCOUNTER — TELEPHONE (OUTPATIENT)
Dept: INTERNAL MEDICINE CLINIC | Facility: CLINIC | Age: 70
End: 2020-06-17

## 2020-06-17 DIAGNOSIS — Z78.9 NORMAL URINALYSIS: Primary | ICD-10-CM

## 2020-06-17 NOTE — TELEPHONE ENCOUNTER
Patients wife informed of message below. Wife asking for labs to be printed and mailed to her home. RN to mail lab orders that were pended from 10/30/2019. Wife is now asking for a urine test to be ordered and added.    I have pended it for your appro

## 2020-06-17 NOTE — TELEPHONE ENCOUNTER
Wife calling (MARYCARMEN) requesting Zolpidem refill as this medication really helps a lot, informed that per our record, there is still  1 refill available ,karlasepideh will call his pharmacy.   Wife is asking if it is ok to take the Zolpidem every night, advised that

## 2020-06-17 NOTE — TELEPHONE ENCOUNTER
Let patient know that this is for short term treatment. Should refill for now but consider weaning off .

## 2020-06-22 ENCOUNTER — OFFICE VISIT (OUTPATIENT)
Dept: INTERNAL MEDICINE CLINIC | Facility: CLINIC | Age: 70
End: 2020-06-22
Payer: COMMERCIAL

## 2020-06-22 ENCOUNTER — TELEPHONE (OUTPATIENT)
Dept: INTERNAL MEDICINE CLINIC | Facility: CLINIC | Age: 70
End: 2020-06-22

## 2020-06-22 ENCOUNTER — MED REC SCAN ONLY (OUTPATIENT)
Dept: INTERNAL MEDICINE CLINIC | Facility: CLINIC | Age: 70
End: 2020-06-22

## 2020-06-22 VITALS
HEIGHT: 70 IN | SYSTOLIC BLOOD PRESSURE: 116 MMHG | WEIGHT: 146.63 LBS | DIASTOLIC BLOOD PRESSURE: 72 MMHG | HEART RATE: 93 BPM | BODY MASS INDEX: 20.99 KG/M2

## 2020-06-22 DIAGNOSIS — G47.00 INSOMNIA, UNSPECIFIED TYPE: ICD-10-CM

## 2020-06-22 DIAGNOSIS — R53.1 WEAKNESS: ICD-10-CM

## 2020-06-22 DIAGNOSIS — E11.9 TYPE 2 DIABETES MELLITUS WITHOUT COMPLICATION, WITHOUT LONG-TERM CURRENT USE OF INSULIN (HCC): ICD-10-CM

## 2020-06-22 DIAGNOSIS — F32.A DEPRESSION, UNSPECIFIED DEPRESSION TYPE: Primary | ICD-10-CM

## 2020-06-22 DIAGNOSIS — R63.4 WEIGHT LOSS, NON-INTENTIONAL: ICD-10-CM

## 2020-06-22 DIAGNOSIS — I10 ESSENTIAL HYPERTENSION WITH GOAL BLOOD PRESSURE LESS THAN 140/90: ICD-10-CM

## 2020-06-22 PROCEDURE — 99214 OFFICE O/P EST MOD 30 MIN: CPT | Performed by: INTERNAL MEDICINE

## 2020-06-22 NOTE — TELEPHONE ENCOUNTER
Wife calling back to find out how does she attach a PDF file to My Chart. Wife given number to My Chart service desk. Wife also transferred to My Chart service desk for assistance.

## 2020-06-22 NOTE — TELEPHONE ENCOUNTER
Received a call from the patient's wife who reports the patient saw Dr. Cherie Julian today 6/22/20 and she needs to send the disability forms to the office. Wife confirmed she will send the forms via [employer].

## 2020-06-22 NOTE — TELEPHONE ENCOUNTER
Patient's wife calling with complaint of patient having increased fatigue, mood changes, and weight loss. Per wife, patient seen Dr. Scottie Ortiz on 5/27/20 for insomnia. Patient was prescribed Zolpidem.    Per wife, when patient takes Zolpidem he gets 7-8 h

## 2020-06-23 ENCOUNTER — TELEPHONE (OUTPATIENT)
Dept: INTERNAL MEDICINE CLINIC | Facility: CLINIC | Age: 70
End: 2020-06-23

## 2020-06-23 NOTE — TELEPHONE ENCOUNTER
RE: [employer] Order   Received:  Today   Message Contents   Margo Campos sent to MD Terra Hand Dr[address]     I spoke with your patient this afternoon, and he gave me permission to speak with his wife for more informati

## 2020-06-23 NOTE — TELEPHONE ENCOUNTER
Disability forms received in Forms dept.  Sent pt [employer] message for [employer]/FCR completion

## 2020-06-24 ENCOUNTER — TELEPHONE (OUTPATIENT)
Dept: INTERNAL MEDICINE CLINIC | Facility: CLINIC | Age: 70
End: 2020-06-24

## 2020-06-24 DIAGNOSIS — R27.0 ATAXIA: ICD-10-CM

## 2020-06-24 DIAGNOSIS — R41.82 ALTERED MENTAL STATUS, UNSPECIFIED ALTERED MENTAL STATUS TYPE: Primary | ICD-10-CM

## 2020-06-24 NOTE — TELEPHONE ENCOUNTER
Pt paid over the phone with forms. Sent [employer] consent and signed HIPAA /FCR forms via email.     Start date: 6/22/20 - pending release by Neuro

## 2020-06-24 NOTE — TELEPHONE ENCOUNTER
Wife states patient had blood work done yesterday. Patient has been crying for 2 days now, \"like a child\". He is eating like a 2year old for a long time. Patient wants Ambien to sleep. Wife states patient states patient is very week right now.  Has no

## 2020-06-25 NOTE — TELEPHONE ENCOUNTER
I do not have any results back in the computer yet. Please make sure the patient had his blood work done at the [facility]. Then please call [facility] and find out where the results are.

## 2020-06-25 NOTE — PROGRESS NOTES
HPI:    Patient ID: Mary Howard is a 78year old male. HPI  Patient is here with concerns about ongoing problems with insomnia, weight loss, and now likely depression. He is accompanied as always by his wife who helps to fill out the history.   We History   Problem Relation Age of Onset   • Cancer Father    • Cancer Mother         esophagus   • Diabetes Neg    • Glaucoma Neg       Social History    Tobacco Use      Smoking status: Former Smoker        Packs/day: 0.25        Types: Cigarettes no rales. Abdominal: Soft. Bowel sounds are normal. He exhibits no mass. There is no tenderness. Musculoskeletal: He exhibits no tenderness. Lymphadenopathy:     He has no cervical adenopathy. Neurological: He is alert. Skin: Skin is warm and dry. blood work. Contact patient with results. Had previously been well controlled. - CBC WITH DIFFERENTIAL WITH PLATELET  - COMP METABOLIC PANEL (14)  - LIPID PANEL  - TSH W REFLEX TO FREE T4  - VITAMIN B12  - HEMOGLOBIN A1C    5.  Essential hypertension wit

## 2020-06-26 RX ORDER — ESCITALOPRAM OXALATE 5 MG/1
5 TABLET ORAL DAILY
Qty: 30 TABLET | Refills: 5 | Status: SHIPPED | OUTPATIENT
Start: 2020-06-26 | End: 2020-07-17

## 2020-06-26 NOTE — TELEPHONE ENCOUNTER
Spoke with patient and wife for about 12 minutes. Discussed test results. Essentially everything is normal with the exception of mild low vitamin D at 19.   Sed rate is normal.  Chemistry panel, diabetes, lipid, CBC are all normal.  Test for celiac diseas

## 2020-06-26 NOTE — TELEPHONE ENCOUNTER
I called spouse and she stated that patient had his blood work and urine test on Tuesday 6/23/2020 at the [facility] location.   I called [facility] and spoke to Alhaji Jean and she will fax the results to the [place] office at [phone]  Onsite staff please look out f

## 2020-06-29 NOTE — TELEPHONE ENCOUNTER
Spouse calling as states there was no recommendation made or prescription sent by Dr Constance Hernandez for the low vitamin D that was discussed below. States pt began taking escitalopram as prescribed but states could not sleep so took 1/2 tab of [place].  States is t

## 2020-06-30 NOTE — TELEPHONE ENCOUNTER
Dr. Terra Curtis,    2 forms - Disab and FMLA     Please sign off on form:  -Highlight the patient and hit \"Chart\" button.   -In Chart Review, w/in the Encounter tab - click 1 time on the Telephone call encounter for 6/23/20 Scroll down the telephone encounter Patient/Caregiver requests family/friend to interpret.

## 2020-06-30 NOTE — TELEPHONE ENCOUNTER
I would advise the patient take the escitalopram in the morning. This typically how it is dose. He can continue with the Ambien at bedtime if needed.

## 2020-06-30 NOTE — TELEPHONE ENCOUNTER
DR. Constance Hernandez: please also f/u on Vitamin D dose you recommend. Patient's vitamin d level 23. Also, spouse asked if patient can have dementia screening. Can nuerologist do this? Patient has appt 7/7/20.

## 2020-07-01 ENCOUNTER — TELEPHONE (OUTPATIENT)
Dept: INTERNAL MEDICINE CLINIC | Facility: CLINIC | Age: 70
End: 2020-07-01

## 2020-07-01 NOTE — TELEPHONE ENCOUNTER
Yes, I will mail them out the [facility] lab order for a Vitamin D in 3 months.     Jorge Walsh

## 2020-07-01 NOTE — TELEPHONE ENCOUNTER
[facility] requesting order for CT and GFR results.  Patient will be seen at their office today at [facility].     Fax  [phone]

## 2020-07-01 NOTE — TELEPHONE ENCOUNTER
Reviewed JUSTINE WOODS instructions as noted below with pt's spouse, Len Lowe (MARYCARMEN in place). Mynor Johnson, did you want pt to have repeat Vit D level drawn in 3 months?

## 2020-07-01 NOTE — TELEPHONE ENCOUNTER
Please call patient and let him know that I prescribed vitamin D for him for 12 weeks. This should be taken once a week. Have him call a follow up with Vitamin D level in 3 months.     David Sender, AIRAM Kirkland with DR. Marina Pitts

## 2020-07-01 NOTE — TELEPHONE ENCOUNTER
Yes, I will mail out the [facility] form to have their Vitamin D level drawn in 3 months.     Mao Garces, ANP

## 2020-07-02 ENCOUNTER — MED REC SCAN ONLY (OUTPATIENT)
Dept: INTERNAL MEDICINE CLINIC | Facility: CLINIC | Age: 70
End: 2020-07-02

## 2020-07-06 ENCOUNTER — TELEPHONE (OUTPATIENT)
Dept: NEUROLOGY | Facility: CLINIC | Age: 70
End: 2020-07-06

## 2020-07-06 ENCOUNTER — TELEPHONE (OUTPATIENT)
Dept: INTERNAL MEDICINE CLINIC | Facility: CLINIC | Age: 70
End: 2020-07-06

## 2020-07-06 NOTE — TELEPHONE ENCOUNTER
Spoke with spouse ( and MARYCARMEN verified)--reports patient did complete CT brain on Thursday last week at [facility].  Spouse has actual CD/disc and will bring to appt tomorrow, but needs CT read to be faxed to Dr. Dee Moreno as well as Dr. Shayla Jarquin for

## 2020-07-07 ENCOUNTER — OFFICE VISIT (OUTPATIENT)
Dept: NEUROLOGY | Facility: CLINIC | Age: 70
End: 2020-07-07
Payer: COMMERCIAL

## 2020-07-07 ENCOUNTER — TELEPHONE (OUTPATIENT)
Dept: NEUROLOGY | Facility: CLINIC | Age: 70
End: 2020-07-07

## 2020-07-07 VITALS
WEIGHT: 145 LBS | HEIGHT: 71 IN | SYSTOLIC BLOOD PRESSURE: 118 MMHG | HEART RATE: 82 BPM | BODY MASS INDEX: 20.3 KG/M2 | DIASTOLIC BLOOD PRESSURE: 62 MMHG

## 2020-07-07 DIAGNOSIS — F32.A DEPRESSION, UNSPECIFIED DEPRESSION TYPE: ICD-10-CM

## 2020-07-07 DIAGNOSIS — R41.89 COGNITIVE AND BEHAVIORAL CHANGES: ICD-10-CM

## 2020-07-07 DIAGNOSIS — R46.89 COGNITIVE AND BEHAVIORAL CHANGES: ICD-10-CM

## 2020-07-07 DIAGNOSIS — R93.0 ABNORMAL CT OF THE HEAD: ICD-10-CM

## 2020-07-07 PROCEDURE — 99204 OFFICE O/P NEW MOD 45 MIN: CPT | Performed by: OTHER

## 2020-07-07 NOTE — TELEPHONE ENCOUNTER
Called patient re: follow-up appointment in 4 weeks and not 3 months per Dr. Jeramie Contreras. Pt's wife requesting CT results to be scanned into pt's chart and also requesting Authorization # for MRI. Rn provided number and CPT codes.

## 2020-07-07 NOTE — TELEPHONE ENCOUNTER
[employer] for authorization of approval for MRI brain w/wo cpt code 45780. Approval was given with Authorization #  [id number] effective 07/07/20 to 01/02/21. Will call Pt. To inform. Sofia informed of approval. They will call to schedule appt.

## 2020-07-07 NOTE — PROGRESS NOTES
HPI:    Patient ID: Gustavo East is a 78year old male. PCP: Dr Dee Moreno    Thank you for requesting this consultation to us.  Below is the summary of my evaluation    HPI   Valerie Marx is a 78year old male with history of diabetes who presents f 2002        Years since quittin.5      Smokeless tobacco: Never Used    Alcohol use: No    Drug use: No           Review of Systems   Constitutional: Negative. HENT: Negative. Eyes: Negative. Respiratory: Negative.     Cardiovascular: Nega UNKNOWN   PHYSICAL EXAM:   Physical Exam  Blood pressure 118/62, pulse 82, height 71\", weight 145 lb (65.8 kg). General: A pleasant 78year old sitting comfortably  HEENT: Normocephalic and atraumatic. Neck: Normal range of motion. Neck supple.  No any signs of dementia. Has psychomotor retardation, symptoms possibly related to major depression attack. Plan: MRI brain w and wo contrast for further evaluation of  temporal lobe atrophy  Psychiatry evaluation.  We will increase dose of Lexapro to 10

## 2020-07-08 NOTE — TELEPHONE ENCOUNTER
Wife (MARYCARMEN) calling and requesting copy of the CT scan results,states that patient has psychiatrist appointment today and will need the copy,asking if can send it through [email or web address] instead. Advised that will clarify it and will call her back.   This nurse bernard

## 2020-07-09 ENCOUNTER — TELEPHONE (OUTPATIENT)
Dept: NEUROLOGY | Facility: CLINIC | Age: 70
End: 2020-07-09

## 2020-07-09 NOTE — TELEPHONE ENCOUNTER
The CT report was printed and was on my desk. I will place it at the nurses station. Impression as follows  1. No intracranial hemorrhage. No abnormal enhancement.   2.  Global parenchymal atrophy which is most pronounced in the anterior right temporal

## 2020-07-13 ENCOUNTER — TELEPHONE (OUTPATIENT)
Dept: NEUROLOGY | Facility: CLINIC | Age: 70
End: 2020-07-13

## 2020-07-13 ENCOUNTER — TELEPHONE (OUTPATIENT)
Dept: INTERNAL MEDICINE CLINIC | Facility: CLINIC | Age: 70
End: 2020-07-13

## 2020-07-14 ENCOUNTER — TELEPHONE (OUTPATIENT)
Dept: INTERNAL MEDICINE CLINIC | Facility: CLINIC | Age: 70
End: 2020-07-14

## 2020-07-14 ENCOUNTER — MED REC SCAN ONLY (OUTPATIENT)
Dept: NEUROLOGY | Facility: CLINIC | Age: 70
End: 2020-07-14

## 2020-07-14 NOTE — TELEPHONE ENCOUNTER
[employer] for authorization of approval for CTA  Brain w/wo cpt code  76183. Approval was given with Authorization # [id number] effective 07/14/20 to 01/09/21. Will call Pt. To inform. L/M advising of approval. Can proceed with scheduling appt.

## 2020-07-14 NOTE — TELEPHONE ENCOUNTER
Spoke to [facility] 2 to send CT brain and MRI results to this office. Ct report sent twice. Awaiting MRI report.

## 2020-07-14 NOTE — TELEPHONE ENCOUNTER
Patient asking to schedule an appointment for her , as advised by 's visit 7-. No availability until August.  Wife says she can do a phone appointment or bring him into an appointment. Please advise if he can be seen sooner.

## 2020-07-14 NOTE — PROGRESS NOTES
HPI:    Patient ID: Brenda Lopez is a 78year old male. PCP: Dr Constance Hernandez      HPI     Patient presents for follow up along with his wife. He had MRI brain done and wife wanted to discuss the results in-person.  MRI shows mild global atrophy and advance Cancer Father    • Cancer Mother         esophagus   • Diabetes Neg    • Glaucoma Neg       Social History    Tobacco Use      Smoking status: Former Smoker        Packs/day: 0.25        Types: Cigarettes        Quit date: 1/1/2002        Years since [facility] ([address]) W/DEVICE Does not apply Kit apply 1 by Misc. (Non-Drug; Combo Route) route, test by intradermal route twice a day       Allergies:  Penicillins             UNKNOWN   PHYSICAL EXAM:   Physical Exam    Blood pressure 115/70, height 71\ confirms right temporal lobe atrophy which can be seen in Alzheimer's or frontotemporal dementia.   Continue current antidepressant and follow with Psychiatry  We will re-assess in 3 months and get a neuro-psychology evaluation to rule out any dementia proc

## 2020-07-15 ENCOUNTER — TELEPHONE (OUTPATIENT)
Dept: INTERNAL MEDICINE CLINIC | Facility: CLINIC | Age: 70
End: 2020-07-15

## 2020-07-15 NOTE — TELEPHONE ENCOUNTER
Wife calling to schedule appointment with Dr. Gustavo Rahman to update patients disability paper work. Per wife she needs specific information for July as the patient's employer is holding his pay until she gets this taken care of.  Per wife she faxed papers to

## 2020-07-15 NOTE — TELEPHONE ENCOUNTER
Pt's wife is calling. Pt scheduled a virtual visit to discuss disability. Wife now states that pt would really like to come in for an in office visit to discuss with PCP about a diagnostic test (venogram of the brain) that the neurologist has ordered.   Pt

## 2020-07-16 ENCOUNTER — TELEPHONE (OUTPATIENT)
Dept: NEUROLOGY | Facility: CLINIC | Age: 70
End: 2020-07-16

## 2020-07-16 ENCOUNTER — TELEPHONE (OUTPATIENT)
Dept: INTERNAL MEDICINE CLINIC | Facility: CLINIC | Age: 70
End: 2020-07-16

## 2020-07-16 NOTE — TELEPHONE ENCOUNTER
Patient's spouse (on MARYCARMEN) calling to see if office received MRI of the brain results that were faxed to the office and also to see if  Dr. Florinda Saldaña follow up report and visit notes were received from psychiatrist's office. Please advise.

## 2020-07-16 NOTE — TELEPHONE ENCOUNTER
Patient's spouse (on MARYCARMEN) calling for update on message below. Can patient use res 24 slot on Monday or can virtual appointment on Friday 7/17/20  be changed to in person? Please see message below as well.

## 2020-07-17 ENCOUNTER — VIRTUAL PHONE E/M (OUTPATIENT)
Dept: INTERNAL MEDICINE CLINIC | Facility: CLINIC | Age: 70
End: 2020-07-17
Payer: COMMERCIAL

## 2020-07-17 DIAGNOSIS — F32.A DEPRESSION, UNSPECIFIED DEPRESSION TYPE: Primary | ICD-10-CM

## 2020-07-17 DIAGNOSIS — G31.9 BRAIN ATROPHY (HCC): ICD-10-CM

## 2020-07-17 PROCEDURE — 99214 OFFICE O/P EST MOD 30 MIN: CPT | Performed by: INTERNAL MEDICINE

## 2020-07-17 NOTE — PROGRESS NOTES
HPI:    Patient ID: Jud Alexander is a 78year old male. Virtual/Telephone Check-In    Jud Alexander verbally consents to a Virtual/Telephone Check-In service on 07/17/20.   Patient has been referred to the [facility] website at [email or web address] does not want to do very much. Wife complains that he does not really want to go out or do anything. He does no longer driving. Does not interested in reading books, articles, or even long emails.   Sleeps typically from about 10 PM to 11 AM.  Does very total) by mouth once daily.  90 tablet 3   • metFORMIN HCl 1000 MG Oral Tab TAKE 1 TABLET BY MOUTH TWICE A DAY WITH MORNING AND EVENING MEALS 180 tablet 3   • Blood Glucose Monitoring Suppl ([address]) W/DEVICE Does not apply Kit apply 1 by Misc return for follow-up virtual visit in 1 month. 2. Brain atrophy ([facility] [address]  As above. Approximately 24 minutes was spent with the patient discussing his medical condition, recent testing, and plan of care.          Meds This Visit:  Requested [employer]

## 2020-07-20 ENCOUNTER — TELEPHONE (OUTPATIENT)
Dept: INTERNAL MEDICINE CLINIC | Facility: CLINIC | Age: 70
End: 2020-07-20

## 2020-07-20 NOTE — TELEPHONE ENCOUNTER
Dr. Dee Moreno,    **2 forms**        Please sign off on form: Disab ext and Disab  -Highlight the patient and hit \"Chart\" button.   -In Chart Review, w/in the Encounter tab - click 1 time on the Telephone call encounter for 6/23/2020 Scroll down the telepho

## 2020-07-22 NOTE — TELEPHONE ENCOUNTER
Disab forms completed, hand signed by Dr Constance Hernandez and faxed to Yaneth Julian.  Pt is aware

## 2020-07-27 ENCOUNTER — TELEPHONE (OUTPATIENT)
Dept: INTERNAL MEDICINE CLINIC | Facility: CLINIC | Age: 70
End: 2020-07-27

## 2020-07-27 DIAGNOSIS — R53.83 FATIGUE, UNSPECIFIED TYPE: Primary | ICD-10-CM

## 2020-07-27 NOTE — TELEPHONE ENCOUNTER
Pt spouse states vit d wasn't coded right. Per spouse pt has hx of vit d deficiency but that code wasn't on the order and now she has an expensive bill.     Pt also states insurance is denying Vit B 12 lab and if Dr Kurt Damon can review the codes to see if he

## 2020-07-30 NOTE — TELEPHONE ENCOUNTER
Pt's wife was called back and notified that code change request was faxed to both [facility] and [facility]. New codes were provided to her via phone as well.  B12 level - ICD:10 - F53.83, Vit D level - ICD:10 - E55.9

## 2020-07-30 NOTE — TELEPHONE ENCOUNTER
Please re-code the vitamin D order with a diagnosis of vitamin D deficiency. Re-code the vitamin B12 level with a diagnosis of fatigue.   Forward this to coding people

## 2020-07-30 NOTE — TELEPHONE ENCOUNTER
Patient's wife calling to follow-up on message below.    Wife requesting that My Chart message be sent to update her with changed codes so she can notify [facility].

## 2020-08-04 NOTE — TELEPHONE ENCOUNTER
We received back from coding dept. Stating the date of service and doctor signature and date was missing. Given back to [facility] [employer] for signature and date. I wrote date of service in.

## 2020-08-12 ENCOUNTER — TELEPHONE (OUTPATIENT)
Dept: INTERNAL MEDICINE CLINIC | Facility: CLINIC | Age: 70
End: 2020-08-12

## 2020-08-12 NOTE — TELEPHONE ENCOUNTER
Wife on phone with [employer] regarding ICD 10 Codes as per below. Informed of both codes listed, but [employer] states the code for the vitamin B was not valid and insurance would not pay. Informed of ICD10 Code D51.8 - \"Other vitamin B12 deficiency anemias. \"

## 2020-08-24 ENCOUNTER — TELEPHONE (OUTPATIENT)
Dept: INTERNAL MEDICINE CLINIC | Facility: CLINIC | Age: 70
End: 2020-08-24

## 2020-08-24 NOTE — TELEPHONE ENCOUNTER
Spouse (MARYCARMEN) reports patient has appt today to have an angiogram of the brain with Thor 9293, reports test was ordered by neurology, reports testing facility is requesting labs, confirmed with Imaging center that GFR is requested, advised that pt

## 2020-08-26 ENCOUNTER — OFFICE VISIT (OUTPATIENT)
Dept: INTERNAL MEDICINE CLINIC | Facility: CLINIC | Age: 70
End: 2020-08-26
Payer: COMMERCIAL

## 2020-08-26 ENCOUNTER — TELEPHONE (OUTPATIENT)
Dept: NEUROLOGY | Facility: CLINIC | Age: 70
End: 2020-08-26

## 2020-08-26 VITALS
HEIGHT: 71 IN | HEART RATE: 89 BPM | SYSTOLIC BLOOD PRESSURE: 111 MMHG | WEIGHT: 145.5 LBS | DIASTOLIC BLOOD PRESSURE: 72 MMHG | BODY MASS INDEX: 20.37 KG/M2

## 2020-08-26 DIAGNOSIS — F32.A DEPRESSION, UNSPECIFIED DEPRESSION TYPE: ICD-10-CM

## 2020-08-26 DIAGNOSIS — Q27.9 VENOUS ANOMALY: Primary | ICD-10-CM

## 2020-08-26 DIAGNOSIS — G31.9 BRAIN ATROPHY (HCC): ICD-10-CM

## 2020-08-26 DIAGNOSIS — G47.00 INSOMNIA, UNSPECIFIED TYPE: ICD-10-CM

## 2020-08-26 DIAGNOSIS — Z00.00 ROUTINE PHYSICAL EXAMINATION: Primary | ICD-10-CM

## 2020-08-26 DIAGNOSIS — I10 ESSENTIAL HYPERTENSION WITH GOAL BLOOD PRESSURE LESS THAN 140/90: ICD-10-CM

## 2020-08-26 DIAGNOSIS — E11.9 TYPE 2 DIABETES MELLITUS WITHOUT COMPLICATION, WITHOUT LONG-TERM CURRENT USE OF INSULIN (HCC): ICD-10-CM

## 2020-08-26 PROCEDURE — 3074F SYST BP LT 130 MM HG: CPT | Performed by: INTERNAL MEDICINE

## 2020-08-26 PROCEDURE — 3078F DIAST BP <80 MM HG: CPT | Performed by: INTERNAL MEDICINE

## 2020-08-26 PROCEDURE — 3008F BODY MASS INDEX DOCD: CPT | Performed by: INTERNAL MEDICINE

## 2020-08-26 PROCEDURE — 99397 PER PM REEVAL EST PAT 65+ YR: CPT | Performed by: INTERNAL MEDICINE

## 2020-08-27 NOTE — PROGRESS NOTES
HPI:    Patient ID: William Tapia is a 78year old male. HPI  Patient is here requesting general physical exam.  He is accompanied by his wife. We last spoke with him by phone on July 17.   Recent history has been dominated by diagnosis of severe de test done in June. A1c at that time 6.4. Other blood work reviewed with patient today.     Patient Active Problem List:     Type II diabetes mellitus ([facility] [address]     Essential hypertension     Presbyopia     Right wrist pain     Type 2 diabetes mellitus without UNKNOWN     PHYSICAL EXAM:   /72 (BP Location: Left arm, Patient Position: Sitting, Cuff Size: adult)   Pulse 89   Ht 5' 11\" (1.803 m)   Wt 145 lb 8 oz (66 kg)   BMI 20.29 kg/m²      Physical Exam    Constitutional: He appears well-develope Discussed various ways to increase his intake. Hopefully as depression lifts, the appetite will improve and will get back to his baseline weight. Recent medications reviewed. Health maintenance issues reviewed. We will not do any labs at this time.   Fo concentration and memory issues that are due to something else.          Meds This Visit:  Requested Prescriptions      No prescriptions requested or ordered in this encounter       Imaging & Referrals:  None         Martin Mathis MD

## 2020-08-28 ENCOUNTER — MED REC SCAN ONLY (OUTPATIENT)
Dept: NEUROLOGY | Facility: CLINIC | Age: 70
End: 2020-08-28

## 2020-08-28 NOTE — TELEPHONE ENCOUNTER
Received disc of CTV Brain done 8/24/20 at [facility] placed in neuro folder for review at f/u, copy sent to scanning    Disc loaded to [facility] PACs and mailed back to patient.

## 2020-08-31 NOTE — TELEPHONE ENCOUNTER
Per Dr. 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      S/w pt and wife, informed

## 2020-09-08 NOTE — TELEPHONE ENCOUNTER
Spoke with spouse ( and MARYCARMEN verified)--waiting for Celina and prior auth for medication. \"We gave Dr. Edie Tidwell the letter when we saw him in the office--he knows about this.  \"    Last Zolpidem refill shows 20 for #30 + 1    Please advise on

## 2020-09-09 RX ORDER — ZOLPIDEM TARTRATE 10 MG/1
10 TABLET ORAL NIGHTLY PRN
Qty: 30 TABLET | Refills: 1 | Status: SHIPPED | OUTPATIENT
Start: 2020-09-09 | End: 2020-09-23 | Stop reason: SINTOL

## 2020-09-16 RX ORDER — ERGOCALCIFEROL 1.25 MG/1
50000 CAPSULE ORAL WEEKLY
Qty: 12 CAPSULE | Refills: 0 | OUTPATIENT
Start: 2020-09-16 | End: 2020-09-28

## 2020-09-21 ENCOUNTER — TELEPHONE (OUTPATIENT)
Dept: INTERNAL MEDICINE CLINIC | Facility: CLINIC | Age: 70
End: 2020-09-21

## 2020-09-21 ENCOUNTER — TELEPHONE (OUTPATIENT)
Dept: NEUROLOGY | Facility: CLINIC | Age: 70
End: 2020-09-21

## 2020-09-21 RX ORDER — CYPROHEPTADINE HYDROCHLORIDE 4 MG/1
4 TABLET ORAL 3 TIMES DAILY
COMMUNITY
Start: 2020-09-23 | End: 2021-02-03 | Stop reason: ALTCHOICE

## 2020-09-21 NOTE — TELEPHONE ENCOUNTER
Patient's spouse (on MARYCARMEN)  states patient has been taking Ambien as prescribed by . Patient saw psychiatrist Dr. Julio C Valenzuela who prescribed new medication  Cyproheptadine 4 mg- twice a day. This medication is for patient's appetite.      Patient's s

## 2020-09-21 NOTE — TELEPHONE ENCOUNTER
Spoke to wife, derik verified. States patient was hallucinating and Arcenio Golas stopped by Dr Li Ren. Has NOV with Dr Joyce Moody psychiatrist on 9/22/20. LOV Dr Mary Meadows 7/14/20. Recommend follow up Sept or Oct.   NOV none.     Wife is requesting next step she s

## 2020-09-22 NOTE — TELEPHONE ENCOUNTER
Per Dr. Joseph Gaffney: There is no urgency but I can see him on Sept 29 at 4 pm if that works of the patient. If not then he is welcome to come to the [facility] office. Thank you. Routing to  to schedule accordingly.

## 2020-09-22 NOTE — TELEPHONE ENCOUNTER
Possible due to combination of Ambien and Cyproheptadine. Agree with stopping Ambien. If hallucinations persist call your Psychiatrist to discuss regarding Cyproheptadine dose adjustment.

## 2020-09-22 NOTE — TELEPHONE ENCOUNTER
Informed pt's wife to discontinue ambien, and if hallucinations persist, discuss Cyproheptadine dose w/ Dr. Rudy Puentes. Wife verbalized understanding. Wanted to schedule f/u appointment, routed to front office to schedule appointment.  Offered next available appoi

## 2020-09-23 RX ORDER — QUETIAPINE 25 MG/1
25 TABLET, FILM COATED ORAL NIGHTLY
COMMUNITY
Start: 2020-09-23 | End: 2020-11-04

## 2020-09-23 NOTE — TELEPHONE ENCOUNTER
Condition update, med list updated    Marta Jimenez do you need to see pt also?  Perhaps after the he sees the specialist, Wife stts telemedicine is okay, she stts d/t disability pcp needs to be in the loop on everything    pt has f/u with psych  on 9/30

## 2020-09-24 NOTE — TELEPHONE ENCOUNTER
I spoke with the patient and wife. He is having issues with these different sleeping pills and medication from the psychiatrist.  Wife reiterated the history that had been documented by the nurse already in this communication. He is off Ambien now.   Took

## 2020-09-24 NOTE — TELEPHONE ENCOUNTER
Spoke to patient's wife. She states that patient's psychiatrist, Dr. Daniela Neely recommended that patient get done neuropsych testing done. She is wondering if Dr. Bentley Stevens can place this order.   She will be calling insurance tomorrow to see if neuropsych testing

## 2020-09-25 ENCOUNTER — MED REC SCAN ONLY (OUTPATIENT)
Dept: INTERNAL MEDICINE CLINIC | Facility: CLINIC | Age: 70
End: 2020-09-25

## 2020-09-25 ENCOUNTER — TELEPHONE (OUTPATIENT)
Dept: INTERNAL MEDICINE CLINIC | Facility: CLINIC | Age: 70
End: 2020-09-25

## 2020-09-28 ENCOUNTER — OFFICE VISIT (OUTPATIENT)
Dept: INTERNAL MEDICINE CLINIC | Facility: CLINIC | Age: 70
End: 2020-09-28
Payer: COMMERCIAL

## 2020-09-28 ENCOUNTER — NURSE TRIAGE (OUTPATIENT)
Dept: INTERNAL MEDICINE CLINIC | Facility: CLINIC | Age: 70
End: 2020-09-28

## 2020-09-28 VITALS
SYSTOLIC BLOOD PRESSURE: 120 MMHG | WEIGHT: 148.69 LBS | HEART RATE: 85 BPM | HEIGHT: 71 IN | DIASTOLIC BLOOD PRESSURE: 79 MMHG | BODY MASS INDEX: 20.81 KG/M2

## 2020-09-28 DIAGNOSIS — H92.01 RIGHT EAR PAIN: Primary | ICD-10-CM

## 2020-09-28 DIAGNOSIS — H61.21 CERUMEN DEBRIS ON TYMPANIC MEMBRANE OF RIGHT EAR: ICD-10-CM

## 2020-09-28 PROCEDURE — 90662 IIV NO PRSV INCREASED AG IM: CPT | Performed by: NURSE PRACTITIONER

## 2020-09-28 PROCEDURE — 99213 OFFICE O/P EST LOW 20 MIN: CPT | Performed by: NURSE PRACTITIONER

## 2020-09-28 PROCEDURE — 3078F DIAST BP <80 MM HG: CPT | Performed by: NURSE PRACTITIONER

## 2020-09-28 PROCEDURE — 90471 IMMUNIZATION ADMIN: CPT | Performed by: NURSE PRACTITIONER

## 2020-09-28 PROCEDURE — 3074F SYST BP LT 130 MM HG: CPT | Performed by: NURSE PRACTITIONER

## 2020-09-28 PROCEDURE — 3008F BODY MASS INDEX DOCD: CPT | Performed by: NURSE PRACTITIONER

## 2020-09-28 NOTE — PATIENT INSTRUCTIONS
Impacted Earwax     Inner ear structures including ear canal and eardrum. Impacted earwax is a buildup of the natural wax in the ear. Impacted earwax is very common. It can cause symptoms such as hearing loss.  It can also make it hard for a healthcare · Earache  · Sense of ear fullness  · Itching in the ear  · Odor from the ear  · Ear drainage  · Dizziness  · Ringing in the ears  · Cough  Treatment for impacted earwax  If you don’t have symptoms, you may not need treatment.  Often the earwax goes away on

## 2020-09-28 NOTE — TELEPHONE ENCOUNTER
Patient wife calling to follow-up to see if paperwork received. Advised wife paperwork received and placed in Dr. Carlos chatman for review.

## 2020-09-29 NOTE — TELEPHONE ENCOUNTER
[facility]  I may have already signed it and returned it, but I don't know for sure

## 2020-09-29 NOTE — PROGRESS NOTES
HPI:    Patient ID: Katherine Bearden is a 78year old male. HPI  Katherine Bearden is a 78year old male. He is here for pressure in his right ear and hear loss. He is requesting that his ears be flushed.   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 Other Topics      Concerns:        Caffeine Concern: Yes          tea, 2 cups        Exercise: No          wallks in yard, uses dumbbells        Pt has a pacemaker: No        Pt has a defibrillator: No        Reaction to local anesthetic: No    Social Hist TAKE 1 TABLET BY MOUTH TWICE A DAY WITH MORNING AND EVENING MEALS 180 tablet 3   • Blood Glucose Monitoring Suppl ([address]   [address]) W/DEVICE Does not apply Kit apply 1 by Misc. (Non-Drug; Combo Route) route, test by intradermal route twice a day bilaterally. Right greater than left. Both ears flushed with a large amount of cerumen removed from right ear. Patient immediately had hearing returned. Both ears examined with no signs of infection. Patient can use debrox after seeing ENT.   Wife unders

## 2020-09-29 NOTE — TELEPHONE ENCOUNTER
Please call the wife. I have reviewed the forms. I have completed the certification of request for reasonable accommodation form. The only question that still needs to be filled out is the specific date the patient stopped working. This is question #9.

## 2020-09-29 NOTE — PROGRESS NOTES
HPI:    Patient ID: Chasity Gonzalez is a 78year old male. PCP: Dr Neto Monreal      HPI     Patient presents for follow up along with his wife. He had MRI brain done and wife wanted to discuss the results in-person.  MRI shows mild global atrophy and advance No past surgical history on file. - no surgeries reported   Family History   Problem Relation Age of Onset   • Cancer Father    • Cancer Mother         esophagus   • Diabetes Neg    • Glaucoma Neg       Social History    Tobacco Use      Smoking status: F Test by Intradermal route 2 times every day 200 strip 3   • Blood Glucose Monitoring Suppl ([address]   [address]) W/DEVICE Does not apply Kit apply 1 by Misc. (Non-Drug; Combo Route) route, test by intradermal route twice a day       Allergies:  Penicillin depressive episodes. MRI brain shows right temporal lobe atrophy raising possibility of early Alzheimer's or frontotemporal dementia. Obtain Neuro-psychology evaluation- Dr Radu Golden not in their network.  Names and phone numbers of other Psychologist pr

## 2020-09-29 NOTE — ASSESSMENT & PLAN NOTE
A/P- Patient here for loss of hearing and pressure in his right ear. Patient has large amount of Cerumen bilaterally. Right greater than left. Both ears flushed with a large amount of cerumen removed from right ear.  Patient immediately had hearing returned

## 2020-09-29 NOTE — TELEPHONE ENCOUNTER
Pt's wife calling for update, advised nurse note , placed on Dr crain for review, stated needed by 10/8/20

## 2020-09-29 NOTE — PATIENT INSTRUCTIONS
Carly Zuleta ( [employer] [address]  [address] [facility]  Phone:  [phone]    [facility]  [address] [place] [address]  Ph: [phone]      Neuropsychological services- Dr Bridgett Simpson and Dr Meron Betts

## 2020-09-30 NOTE — TELEPHONE ENCOUNTER
Form completed and placed at [facility], copy sent to medical records.

## 2020-09-30 NOTE — TELEPHONE ENCOUNTER
Left message for patient to call office back, please transfer call to ext [phone] when he calls office back. Need dates absent from work, in order for form to be completed.

## 2020-09-30 NOTE — TELEPHONE ENCOUNTER
Spoke to both patient and wife, informed them that form was faxed to [employer] [phone] and confirmation was received.

## 2020-10-01 ENCOUNTER — OFFICE VISIT (OUTPATIENT)
Dept: OPHTHALMOLOGY | Facility: CLINIC | Age: 70
End: 2020-10-01
Payer: COMMERCIAL

## 2020-10-01 ENCOUNTER — TELEPHONE (OUTPATIENT)
Dept: NEUROLOGY | Facility: CLINIC | Age: 70
End: 2020-10-01

## 2020-10-01 DIAGNOSIS — E11.9 TYPE 2 DIABETES MELLITUS WITHOUT RETINOPATHY (HCC): Primary | ICD-10-CM

## 2020-10-01 DIAGNOSIS — H43.391 FLOATERS, RIGHT: ICD-10-CM

## 2020-10-01 DIAGNOSIS — H25.13 AGE-RELATED NUCLEAR CATARACT OF BOTH EYES: ICD-10-CM

## 2020-10-01 PROBLEM — H43.393 FLOATER, VITREOUS, BILATERAL: Status: ACTIVE | Noted: 2020-10-01

## 2020-10-01 PROBLEM — H43.393 FLOATER, VITREOUS, BILATERAL: Status: RESOLVED | Noted: 2020-10-01 | Resolved: 2020-10-01

## 2020-10-01 PROCEDURE — 92014 COMPRE OPH EXAM EST PT 1/>: CPT | Performed by: OPHTHALMOLOGY

## 2020-10-01 NOTE — PROGRESS NOTES
Agnes Bahena is a 78year old male.     HPI:     HPI     Diabetic Eye Exam      Additional comments: Pt has been a diabetic for 15 years       Pt's diabetes is currently controlled by pills  Pt checks BS once a day   Pt's last blood sugar was 75  Last Hr Take 1 tablet (5 mg total) by mouth 2 (two) times daily. 180 tablet 3   • lisinopril 10 MG Oral Tab Take 1 tablet (10 mg total) by mouth once daily.  90 tablet 3   • metFORMIN HCl 1000 MG Oral Tab TAKE 1 TABLET BY MOUTH TWICE A DAY WITH MORNING AND EVENI Sphere Cylinder Axis Add    Right +1.00 +0.75 090 +2.50    Left +0.50 Sphere  +2.50    Type: Progressive bifocal                 ASSESSMENT/PLAN:     Diagnoses and Plan:     Type 2 diabetes mellitus without retinopathy ([facility] [address]  Diabetes type II: no back

## 2020-10-01 NOTE — ASSESSMENT & PLAN NOTE
Discussed moderate to advanced cataracts in both eyes that are not affecting vision and are not surgical at this time. Copy of last glasses RX given today.

## 2020-10-02 NOTE — TELEPHONE ENCOUNTER
Received forms for certification of request for reasonable accommodations. The forms have been filled out already by Dr. Edie Tidwell. Placed in scanning.

## 2020-10-05 RX ORDER — ERGOCALCIFEROL 1.25 MG/1
50000 CAPSULE ORAL WEEKLY
Qty: 12 CAPSULE | Refills: 0 | OUTPATIENT
Start: 2020-10-05 | End: 2020-10-17

## 2020-10-07 ENCOUNTER — TELEPHONE (OUTPATIENT)
Dept: INTERNAL MEDICINE CLINIC | Facility: CLINIC | Age: 70
End: 2020-10-07

## 2020-10-07 DIAGNOSIS — R73.09 ELEVATED GLUCOSE: ICD-10-CM

## 2020-10-07 DIAGNOSIS — R79.89 ABNORMAL THYROID BLOOD TEST: Primary | ICD-10-CM

## 2020-10-07 NOTE — TELEPHONE ENCOUNTER
Spoke with spouse ( and MARYCARMEN verified)--requesting Ergocalciferol refill--relayed to spouse message below--she verbalizes understanding and agreement.     F/U appt made for 2020 with Dr. Kurt Damon per spouse's request. Arminda Hernandez is still seeing the psychia

## 2020-10-08 ENCOUNTER — TELEPHONE (OUTPATIENT)
Dept: OPHTHALMOLOGY | Facility: CLINIC | Age: 70
End: 2020-10-08

## 2020-10-08 NOTE — TELEPHONE ENCOUNTER
Per Paul Burris at [facility] needs new order for same rx.  Thank you  Fax: [phone]

## 2020-10-20 ENCOUNTER — TELEPHONE (OUTPATIENT)
Dept: FAMILY MEDICINE CLINIC | Facility: CLINIC | Age: 70
End: 2020-10-20

## 2020-10-20 ENCOUNTER — TELEPHONE (OUTPATIENT)
Dept: NEUROLOGY | Facility: CLINIC | Age: 70
End: 2020-10-20

## 2020-10-20 DIAGNOSIS — E55.9 VITAMIN D DEFICIENCY: Primary | ICD-10-CM

## 2020-10-20 NOTE — TELEPHONE ENCOUNTER
Patient's wife requesting a new lab order for Vitamin D under Dr. Conti Files name. Per wife, in the past there was a problem with the diagnostic code and this caused billing issues. Patient's wife requesting a call back once order is completed.

## 2020-10-20 NOTE — TELEPHONE ENCOUNTER
Not clear on what the specifics of what the wife is concerned about. New order for vitamin D level with a diagnosis of vitamin D deficiency.  That should be covered

## 2020-11-04 ENCOUNTER — OFFICE VISIT (OUTPATIENT)
Dept: INTERNAL MEDICINE CLINIC | Facility: CLINIC | Age: 70
End: 2020-11-04
Payer: COMMERCIAL

## 2020-11-04 VITALS
BODY MASS INDEX: 20.58 KG/M2 | HEIGHT: 71 IN | RESPIRATION RATE: 18 BRPM | DIASTOLIC BLOOD PRESSURE: 68 MMHG | WEIGHT: 147 LBS | HEART RATE: 90 BPM | SYSTOLIC BLOOD PRESSURE: 100 MMHG

## 2020-11-04 DIAGNOSIS — E11.9 TYPE 2 DIABETES MELLITUS WITHOUT COMPLICATION, WITHOUT LONG-TERM CURRENT USE OF INSULIN (HCC): Primary | ICD-10-CM

## 2020-11-04 DIAGNOSIS — I10 ESSENTIAL HYPERTENSION WITH GOAL BLOOD PRESSURE LESS THAN 140/90: ICD-10-CM

## 2020-11-04 DIAGNOSIS — F32.A DEPRESSION, UNSPECIFIED DEPRESSION TYPE: ICD-10-CM

## 2020-11-04 DIAGNOSIS — E55.9 VITAMIN D DEFICIENCY: ICD-10-CM

## 2020-11-04 PROCEDURE — 3074F SYST BP LT 130 MM HG: CPT | Performed by: INTERNAL MEDICINE

## 2020-11-04 PROCEDURE — 99214 OFFICE O/P EST MOD 30 MIN: CPT | Performed by: INTERNAL MEDICINE

## 2020-11-04 PROCEDURE — 99072 ADDL SUPL MATRL&STAF TM PHE: CPT | Performed by: INTERNAL MEDICINE

## 2020-11-04 PROCEDURE — 3008F BODY MASS INDEX DOCD: CPT | Performed by: INTERNAL MEDICINE

## 2020-11-04 PROCEDURE — 3078F DIAST BP <80 MM HG: CPT | Performed by: INTERNAL MEDICINE

## 2020-11-04 RX ORDER — MIRTAZAPINE 30 MG/1
30 TABLET, FILM COATED ORAL DAILY
COMMUNITY
Start: 2020-10-28 | End: 2021-02-03 | Stop reason: ALTCHOICE

## 2020-11-04 RX ORDER — TEMAZEPAM 7.5 MG/1
CAPSULE ORAL
COMMUNITY
Start: 2020-10-29 | End: 2021-02-03 | Stop reason: ALTCHOICE

## 2020-11-04 RX ORDER — ESZOPICLONE 2 MG/1
TABLET, FILM COATED ORAL
COMMUNITY
Start: 2020-10-01 | End: 2020-11-04 | Stop reason: ALTCHOICE

## 2020-11-04 NOTE — PROGRESS NOTES
HPI:    Patient ID: Saumya Culp is a 78year old male. HPI  Patient is here for follow-up on chronic medical issues as listed below. He is accompanied by his wife. He was last seen here in August for physical exam.  No changes made at that time. History   Problem Relation Age of Onset   • Cancer Father    • Cancer Mother         esophagus   • Diabetes Neg    • Glaucoma Neg    • Macular degeneration Neg       Social History    Tobacco Use      Smoking status: Former Smoker        Packs/day: 0.25 tablet 3   • metFORMIN HCl 1000 MG Oral Tab TAKE 1 TABLET BY MOUTH TWICE A DAY WITH MORNING AND EVENING MEALS 180 tablet 3   • Blood Glucose Monitoring Suppl ([address]   [address]) W/DEVICE Does not apply Kit apply 1 by Misc. (Non-Drug; Combo Route) route, depression type  Patient with severe debilitating depression over the past many months. He is followed closely by a psychiatrist.  He remains on disability from work. Psychiatrist and neurologist also previously discussed issues of memory loss.   Felt to

## 2020-11-13 ENCOUNTER — PATIENT MESSAGE (OUTPATIENT)
Dept: INTERNAL MEDICINE CLINIC | Facility: CLINIC | Age: 70
End: 2020-11-13

## 2020-11-14 NOTE — TELEPHONE ENCOUNTER
Please proceed with the preauthorization process. I approve the patient taking temazepam as prescribed. He requires it to help him sleep. Patient also has underlying severe depression which is being treated by the psychiatrist as well.

## 2020-11-14 NOTE — TELEPHONE ENCOUNTER
Patient's wife calling stating  patient's temazepam has bene refused by the insurance  company. Per wife, that patient's psychiatrist office completed prior  authorization and appeal and medication was denied.    Patient was instructed  to contact Dr. Cinda Juares

## 2020-11-15 ENCOUNTER — TELEPHONE (OUTPATIENT)
Dept: INTERNAL MEDICINE CLINIC | Facility: CLINIC | Age: 70
End: 2020-11-15

## 2020-11-15 NOTE — TELEPHONE ENCOUNTER
Regarding: Prescription Question  ----- Message from Giovani Mckenna RN sent at 11/14/2020 12:20 PM CST -----       ----- Message from Miranda Bird to Oriana Samaniego MD sent at 11/13/2020  7:48 PM -----   Hi Dr. Ian Mills you are well.   My

## 2020-11-15 NOTE — TELEPHONE ENCOUNTER
Please proceed with PA, per Dr. Demian Neely instructions. [employer] messages dated 11/14 have further information.

## 2020-11-15 NOTE — TELEPHONE ENCOUNTER
Fahad Siu MD  to [facility] Rn Triage         1:53 PM  Note     Please proceed with the preauthorization process. I approve the patient taking temazepam as prescribed. He requires it to help him sleep.   Patient also has underlying severe depression which

## 2020-11-16 NOTE — TELEPHONE ENCOUNTER
Spoke to pharmacist, who states medication was already picked up on 11/15/2020. Insurance response states medication is covered by patient's benefit plan. No prior authorization is needed.

## 2020-11-16 NOTE — TELEPHONE ENCOUNTER
Prior authorization for Temazepam completed w/ Martha on cover my meds Key: [id number], turn around time 1-5 days.

## 2020-11-23 ENCOUNTER — TELEPHONE (OUTPATIENT)
Dept: ADMINISTRATIVE | Age: 70
End: 2020-11-23

## 2020-11-23 NOTE — TELEPHONE ENCOUNTER
Patients wife called stating they need all medical records since June faxed to [facility]. Explained they will need to go through [employer]. I transferred  her  to them.

## 2020-12-03 ENCOUNTER — TELEPHONE (OUTPATIENT)
Dept: INTERNAL MEDICINE CLINIC | Facility: CLINIC | Age: 70
End: 2020-12-03

## 2020-12-03 NOTE — TELEPHONE ENCOUNTER
Spouse indicated that faxed disability paperwork for Dr Scottie Ortiz to complete to fax [phone]. Please advise if received.

## 2020-12-04 NOTE — TELEPHONE ENCOUNTER
Form [employer]- short term disability) placed on [employer] desk for review and signature.

## 2020-12-07 RX ORDER — LANCETS
EACH MISCELLANEOUS
Qty: 100 EACH | Refills: 0 | Status: SHIPPED | OUTPATIENT
Start: 2020-12-07 | End: 2021-01-06

## 2020-12-17 RX ORDER — BLOOD SUGAR DIAGNOSTIC
STRIP MISCELLANEOUS
Qty: 200 STRIP | Refills: 3 | Status: SHIPPED | OUTPATIENT
Start: 2020-12-17 | End: 2021-01-31

## 2020-12-22 ENCOUNTER — TELEPHONE (OUTPATIENT)
Dept: NEUROLOGY | Facility: CLINIC | Age: 70
End: 2020-12-22

## 2020-12-23 NOTE — TELEPHONE ENCOUNTER
From: Nikko Chiang  To: Amrita Santizo MD  Sent: 12/22/2020  8:22 PM CST  Subject: Other    Dr. Roni Johnson,    This is Taneshakreon Susu, spouse of your patient Tiara Cunningham.  [employer] is trying to reach your office concerning Johnny’s long-term disa

## 2020-12-29 NOTE — TELEPHONE ENCOUNTER
Spoke to Renee at [employer]. Reviewed office note from 9/26/20. He states that patient has a long term disability claim and what they are seeing is that patient is most likely not going to be able to return to work.   They are trying to see if disability w

## 2021-01-05 NOTE — TELEPHONE ENCOUNTER
Current Outpatient Medications   Medication Sig Dispense Refill   • Accu-Chek Softclix Lancets Does not apply Misc USE ONCE DAILY 100 each 0

## 2021-01-06 RX ORDER — LANCETS
EACH MISCELLANEOUS
Qty: 100 EACH | Refills: 3 | Status: SHIPPED | OUTPATIENT
Start: 2021-01-06 | End: 2021-02-03

## 2021-01-27 ENCOUNTER — TELEPHONE (OUTPATIENT)
Dept: INTERNAL MEDICINE CLINIC | Facility: CLINIC | Age: 71
End: 2021-01-27

## 2021-01-27 NOTE — TELEPHONE ENCOUNTER
Please confirm that his lab preference is [facility]. His labs have been ordered to [facility] labs. Let us know if he prefers a different lab.     AIRAM Watson  Working with Dr. Zaragoza He

## 2021-01-28 NOTE — TELEPHONE ENCOUNTER
Patient wife  calling about upcoming labs , informed labs are at [address] and non fasting

## 2021-01-31 DIAGNOSIS — Z12.5 SCREENING FOR PROSTATE CANCER: Primary | ICD-10-CM

## 2021-01-31 RX ORDER — BLOOD SUGAR DIAGNOSTIC
STRIP MISCELLANEOUS
Qty: 100 STRIP | Refills: 1 | Status: SHIPPED | OUTPATIENT
Start: 2021-01-31 | End: 2021-02-03

## 2021-02-01 ENCOUNTER — TELEPHONE (OUTPATIENT)
Dept: NEUROLOGY | Facility: CLINIC | Age: 71
End: 2021-02-01

## 2021-02-03 ENCOUNTER — OFFICE VISIT (OUTPATIENT)
Dept: INTERNAL MEDICINE CLINIC | Facility: CLINIC | Age: 71
End: 2021-02-03
Payer: COMMERCIAL

## 2021-02-03 ENCOUNTER — LAB ENCOUNTER (OUTPATIENT)
Dept: LAB | Facility: HOSPITAL | Age: 71
End: 2021-02-03
Attending: NURSE PRACTITIONER
Payer: MEDICARE

## 2021-02-03 VITALS
SYSTOLIC BLOOD PRESSURE: 114 MMHG | WEIGHT: 151.63 LBS | HEIGHT: 71 IN | DIASTOLIC BLOOD PRESSURE: 72 MMHG | HEART RATE: 88 BPM | BODY MASS INDEX: 21.23 KG/M2

## 2021-02-03 DIAGNOSIS — I10 ESSENTIAL HYPERTENSION WITH GOAL BLOOD PRESSURE LESS THAN 140/90: ICD-10-CM

## 2021-02-03 DIAGNOSIS — F32.A DEPRESSION, UNSPECIFIED DEPRESSION TYPE: ICD-10-CM

## 2021-02-03 DIAGNOSIS — E11.9 TYPE 2 DIABETES MELLITUS WITHOUT COMPLICATION, WITHOUT LONG-TERM CURRENT USE OF INSULIN (HCC): Primary | ICD-10-CM

## 2021-02-03 LAB — PSA SERPL-MCNC: 4.23 NG/ML (ref ?–4)

## 2021-02-03 PROCEDURE — 99214 OFFICE O/P EST MOD 30 MIN: CPT | Performed by: INTERNAL MEDICINE

## 2021-02-03 PROCEDURE — 3008F BODY MASS INDEX DOCD: CPT | Performed by: INTERNAL MEDICINE

## 2021-02-03 PROCEDURE — 84153 ASSAY OF PSA TOTAL: CPT | Performed by: NURSE PRACTITIONER

## 2021-02-03 PROCEDURE — 36415 COLL VENOUS BLD VENIPUNCTURE: CPT | Performed by: NURSE PRACTITIONER

## 2021-02-03 PROCEDURE — 3078F DIAST BP <80 MM HG: CPT | Performed by: INTERNAL MEDICINE

## 2021-02-03 PROCEDURE — 3074F SYST BP LT 130 MM HG: CPT | Performed by: INTERNAL MEDICINE

## 2021-02-03 RX ORDER — MIRTAZAPINE 15 MG/1
15 TABLET, FILM COATED ORAL DAILY
COMMUNITY
Start: 2021-01-27 | End: 2021-05-17

## 2021-02-03 RX ORDER — TEMAZEPAM 15 MG/1
15 CAPSULE ORAL NIGHTLY PRN
COMMUNITY
Start: 2021-01-14 | End: 2021-05-17

## 2021-02-03 RX ORDER — GLIPIZIDE 5 MG/1
5 TABLET, FILM COATED, EXTENDED RELEASE ORAL 2 TIMES DAILY
Qty: 180 TABLET | Refills: 3 | Status: SHIPPED | OUTPATIENT
Start: 2021-02-03 | End: 2021-05-17

## 2021-02-03 RX ORDER — BLOOD SUGAR DIAGNOSTIC
STRIP MISCELLANEOUS
Qty: 100 STRIP | Refills: 5 | Status: SHIPPED | OUTPATIENT
Start: 2021-02-03 | End: 2021-12-01

## 2021-02-03 RX ORDER — LISINOPRIL 10 MG/1
10 TABLET ORAL
Qty: 90 TABLET | Refills: 3 | Status: SHIPPED | OUTPATIENT
Start: 2021-02-03 | End: 2021-08-25

## 2021-02-03 RX ORDER — ESCITALOPRAM OXALATE 5 MG/1
5 TABLET ORAL DAILY
COMMUNITY
Start: 2021-01-27 | End: 2021-04-17

## 2021-02-03 RX ORDER — LANCETS
EACH MISCELLANEOUS
Qty: 100 EACH | Refills: 3 | Status: SHIPPED | OUTPATIENT
Start: 2021-02-03 | End: 2021-12-01

## 2021-02-03 NOTE — PROGRESS NOTES
HPI:    Patient ID: Ricky Storm is a 69year old male. HPI  Patient is here for follow-up on chronic medical issues as listed below. Last seen November 14. At that time A1c was 5.7.   He was still dealing with his ongoing debilitating depression No    Drug use: No         Review of Systems         Current Outpatient Medications   Medication Sig Dispense Refill   • temazepam 15 MG Oral Cap Take 15 mg by mouth nightly as needed. • mirtazapine 15 MG Oral Tab Take 15 mg by mouth daily.      • Gluco There is no abdominal tenderness. Musculoskeletal: He exhibits no tenderness. Lymphadenopathy:     He has no cervical adenopathy. Neurological: He is alert. Skin: Skin is warm and dry. No rash noted.    Psychiatric: Thought content normal.   Flat affe MD

## 2021-02-09 ENCOUNTER — TELEPHONE (OUTPATIENT)
Dept: OTHER | Age: 71
End: 2021-02-09

## 2021-02-09 ENCOUNTER — TELEPHONE (OUTPATIENT)
Dept: INTERNAL MEDICINE CLINIC | Facility: CLINIC | Age: 71
End: 2021-02-09

## 2021-02-09 ENCOUNTER — TELEPHONE (OUTPATIENT)
Dept: NEUROLOGY | Facility: CLINIC | Age: 71
End: 2021-02-09

## 2021-02-09 NOTE — TELEPHONE ENCOUNTER
Received call from the patient's wife. 1. She wanted to know if Dr. Guru Fisher can help her  get the [address] shot sooner.      - Advised that Dr. Guru Fisher cannot do this, thorough explanation given.    2. She states patient will be going on long tern

## 2021-02-09 NOTE — TELEPHONE ENCOUNTER
Faxed [facility] documents to [facility] for Dr. Brennan Rodriguez to view/complete.

## 2021-02-09 NOTE — TELEPHONE ENCOUNTER
Patient spouse wanted the office aware that they are monitoring his reaction to the mirtazapine and temazepam.  Dr. Tyrel Hunt aware that patient is experiencing loose stools and stomach upset in reaction to these medications.     Spouse is changing diet and m

## 2021-02-09 NOTE — TELEPHONE ENCOUNTER
Patient's wife Evan Ross called asking if we have received [employer] disability papers. Informed patient's wife we will complete the needed tasks on our end then fax over documents. Patient's wife stated this needs to be done as soon as possible.

## 2021-02-09 NOTE — TELEPHONE ENCOUNTER
Patient's spouse called back; She called the [facility] hotline and was advised that they could help her if she had a doctor's order.     RN explained, again that there is a team reviewing all patient charts for their medical history and current medical conditi

## 2021-02-11 NOTE — TELEPHONE ENCOUNTER
Spoke to wife. She states that [facility] told her that they sent reports from other physicians and they are looking for comments to see if Dr. Maria Ines Stern agrees with what the other physicians are saying.       She states that it is due by 2/14 and is hoping it

## 2021-02-12 NOTE — TELEPHONE ENCOUNTER
Added notes from provider's 9/29/2020 adding findings that were omitted from paperwork. Added notation that provider deferred to neuropysch findings at this time. Provider has signed the notation on form noting above.     Paperwork faxed to [employer] a

## 2021-02-17 ENCOUNTER — TELEPHONE (OUTPATIENT)
Dept: INTERNAL MEDICINE CLINIC | Facility: CLINIC | Age: 71
End: 2021-02-17

## 2021-02-17 RX ORDER — ZOLPIDEM TARTRATE 10 MG/1
5 TABLET ORAL NIGHTLY PRN
Qty: 90 TABLET | Refills: 1 | Status: CANCELLED | OUTPATIENT
Start: 2021-02-17

## 2021-02-17 NOTE — TELEPHONE ENCOUNTER
Pt's wife is calling and states the pt's has stopped Mirtazapine and Temazepam and now symptoms with loose stools and GI upset have subsided. She reports that the pt had hallucinations when taking Ambien 10mg previously.  She states he has cut them in half

## 2021-02-18 RX ORDER — ZOLPIDEM TARTRATE 5 MG/1
5 TABLET ORAL NIGHTLY PRN
Qty: 30 TABLET | Refills: 1 | Status: SHIPPED | OUTPATIENT
Start: 2021-02-18 | End: 2021-06-08

## 2021-02-20 ENCOUNTER — NURSE TRIAGE (OUTPATIENT)
Dept: INTERNAL MEDICINE CLINIC | Facility: CLINIC | Age: 71
End: 2021-02-20

## 2021-02-20 NOTE — TELEPHONE ENCOUNTER
Action Requested: Summary for Provider     []  Critical Lab, Recommendations Needed  [] Need Additional Advice  [x]   FYI    [x]   Need Orders  [] Need Medications Sent to Pharmacy  []  Other     SUMMARY: Patient stated that for the past few weeks has been

## 2021-02-22 ENCOUNTER — TELEPHONE (OUTPATIENT)
Dept: INTERNAL MEDICINE CLINIC | Facility: CLINIC | Age: 71
End: 2021-02-22

## 2021-02-22 NOTE — TELEPHONE ENCOUNTER
Wife states [employer] faxed over papers to Dr. Bk Garcia office yesterday and would like to make sure they have received them. Patient has appt on Wednesday with Dr. Isaura Moy and would like to go over these papers.     If office has not received fax, please no

## 2021-02-22 NOTE — TELEPHONE ENCOUNTER
Advised patient's wife of Hortencia's note. Patient verbalized understanding.      She will be faxing more paper work today regarding the Psychiatrist.     Routing to EM BAUTISTA Zaman 150 LPN/CMP

## 2021-03-02 ENCOUNTER — TELEPHONE (OUTPATIENT)
Dept: INTERNAL MEDICINE CLINIC | Facility: CLINIC | Age: 71
End: 2021-03-02

## 2021-03-02 NOTE — TELEPHONE ENCOUNTER
Wife (not MARYCARMEN) calling and wants to follow up the FORMS that they gave to DR Mila Seth during office visit on 2/24/21 and states that she fax it as well. States that it is a Long Term disability  And will need the form for their income.    States also that s

## 2021-03-04 PROBLEM — G31.9 BRAIN ATROPHY (HCC): Status: ACTIVE | Noted: 2021-03-04

## 2021-03-04 PROBLEM — G31.9 BRAIN ATROPHY: Status: ACTIVE | Noted: 2021-03-04

## 2021-03-04 NOTE — PROGRESS NOTES
HPI:    Patient ID: Gracie Jameson is a 69year old male. HPI  Patient is here for follow-up on severe major depression, generalized anxiety disorder, diabetes, and other chronic medical issues as listed below. We last saw him on February 3.   He was the tasks he had before including go to work. He has been disabled from going to work for quite some time now.     Patient Active Problem List:     Type II diabetes mellitus ([facility] [address]     Essential hypertension     Presbyopia     Right wrist pain     Type 2 di Misc.(Non-Drug; Combo Route) route, test by intradermal route twice a day     • temazepam 15 MG Oral Cap Take 15 mg by mouth nightly as needed. • mirtazapine 15 MG Oral Tab Take 15 mg by mouth daily.      • escitalopram 5 MG Oral Tab Take 5 mg by mouth Previously he held a very high level job as a  at his company. It has been extremely clear throughout this last year that he has not been able to return to work because of the severe depression with associated cognitive impairments.   I a

## 2021-03-08 ENCOUNTER — MED REC SCAN ONLY (OUTPATIENT)
Dept: NEUROLOGY | Facility: CLINIC | Age: 71
End: 2021-03-08

## 2021-03-15 NOTE — TELEPHONE ENCOUNTER
Spoke with the patient's  wife, states that  They did not received the letter/forms yet,  states  he cannot go back to work, states that they only need a letter-stating  that the company is  requesting  letter only in doctors word how the patient is doing

## 2021-03-16 ENCOUNTER — TELEPHONE (OUTPATIENT)
Dept: INTERNAL MEDICINE CLINIC | Facility: CLINIC | Age: 71
End: 2021-03-16

## 2021-03-16 NOTE — TELEPHONE ENCOUNTER
I spent 20 minutes speaking to the pt's  about the process of applying for this pt's long term disability.   Pt's wife states that she gave Dr. Constance Hernandez all the paperwork from [facility] but [facility] called her yesterday and stated that they still don't h

## 2021-03-17 NOTE — TELEPHONE ENCOUNTER
Please call wife. I have generated the letter. We can send it to wherever they want us to. I have reviewed the forms that she gave to us. However these forms are not to be filled out by the physician. Instead of to be filled out by the claimant.   So i

## 2021-03-17 NOTE — TELEPHONE ENCOUNTER
Spoke to wife regarding below,    Information attached as requested, letter generated by Dr. Harsha Alberto has also been attached and faxed to [address] [facility] [place]. Per patient spouse request copy of all information faxed mailed to home.     All information saved by Dr. Pavan Pickens

## 2021-03-17 NOTE — TELEPHONE ENCOUNTER
The wife stated she just received a message from [employer] and the following needs to be done:     The patient's wife stated while sending the letter and form   Please attach below to the form:    [employer] claim number: [id number]  Employee Name: Angeles Zuniga,

## 2021-03-17 NOTE — TELEPHONE ENCOUNTER
Advised patient's of Dr. Carlos Boyle note. Patient's wife verbalized understanding. Stayed on phone with patient's wife while she read letter on patient's MyChart. Wife is agreeable to plan and letter.      She would like the office staff to fax forms that we

## 2021-03-26 NOTE — TELEPHONE ENCOUNTER
Pt's wife stated she called last week -requesting the info forms that was faxed be also mailed to her and she have not received them  Please see message below

## 2021-03-29 NOTE — TELEPHONE ENCOUNTER
Called pt wife and informed that mail does take a bit to arrive but all information has been mailed to them. Informed that if they do not receive anything by the end of this week to call and we will re-mail information.   Pt verbalized understanding

## 2021-04-08 ENCOUNTER — TELEPHONE (OUTPATIENT)
Dept: INTERNAL MEDICINE CLINIC | Facility: CLINIC | Age: 71
End: 2021-04-08

## 2021-04-08 NOTE — TELEPHONE ENCOUNTER
Contacted [place] and talked with [employer] RN. Reviewed Dr. Osullivan Gamma message as noted below. Nothing further needed at this time.

## 2021-04-08 NOTE — TELEPHONE ENCOUNTER
Patient was started on atorvastatin on October 2018. It was stopped in June 2020 for possible side effects including weakness and fatigue. I can bring up the issue of restarting a statin at a future visit.   If Jo Ann Damon from [facility] wishes to further

## 2021-04-08 NOTE — TELEPHONE ENCOUNTER
Brinda reached out from [facility] care inquiring if the doctor was open to reevaluating if a statin was still appropriate.

## 2021-04-14 ENCOUNTER — TELEPHONE (OUTPATIENT)
Dept: INTERNAL MEDICINE CLINIC | Facility: CLINIC | Age: 71
End: 2021-04-14

## 2021-04-14 NOTE — TELEPHONE ENCOUNTER
Son states they are concern about patient, states patient has stopped taking his depression medication and he is living in a high stress environment.  Son lives in [place], son requesting to speak with Dr. Terra Curtis regarding patient to see if doctor would

## 2021-04-16 NOTE — TELEPHONE ENCOUNTER
Attempted to beverly Pt and wife to get a verbal consent and request for ok for Dr. Holly Castillo to speak with their son.  TCB

## 2021-04-16 NOTE — TELEPHONE ENCOUNTER
I do not see any HIPPAA compliant forms that would allow me to speak with the son about the patient. Unless we get consent from the patient or the wife, I would not feel comfortable having this discussion from a legal standpoint.   I be happy to speak wit

## 2021-04-16 NOTE — TELEPHONE ENCOUNTER
Spoke with pt and his his wife,  verified, they were informed of Dr Shalini Zhu message.   Pt and his wife stated they don't authorized both of their sons Hodan Flores and Lucila) to get any information regarding pt and we don't need to call him back regarding

## 2021-04-17 RX ORDER — ESCITALOPRAM OXALATE 5 MG/1
5 TABLET ORAL DAILY
Qty: 90 TABLET | Refills: 1 | Status: SHIPPED | OUTPATIENT
Start: 2021-04-17 | End: 2021-05-04

## 2021-04-17 NOTE — TELEPHONE ENCOUNTER
Patients wife contacted office, on [facility]. She was asking for RN to verify the message from yesterday. She is very upset that the patients 2 natural sons were calling for medical information yesterday. Wife mentioned she was trying to be nice to his children

## 2021-04-17 NOTE — TELEPHONE ENCOUNTER
Advised pt's wife (on MARYCARMEN) of providers note below. She verbalized understanding and no further questions or concerns.

## 2021-04-26 ENCOUNTER — TELEPHONE (OUTPATIENT)
Dept: FAMILY MEDICINE CLINIC | Facility: CLINIC | Age: 71
End: 2021-04-26

## 2021-04-26 NOTE — TELEPHONE ENCOUNTER
Dr Dee Moreno: wife called and with update. She seems concerned due to weight loss during Ramadan. Today is 14th day fasting. Patient weight 5 days ago was 140. Today 137.3. Ib.    FBS checking 7pm between .   Patient eats 4am and after sunse

## 2021-04-27 NOTE — TELEPHONE ENCOUNTER
Please call the wife. These numbers appear okay as far as the blood sugar. However I am concerned about his weight loss. He may need to consider a medical exception to complying with fasting during the month of Ramadan.   He may not be able to physically

## 2021-04-28 ENCOUNTER — TELEPHONE (OUTPATIENT)
Dept: INTERNAL MEDICINE CLINIC | Facility: CLINIC | Age: 71
End: 2021-04-28

## 2021-04-28 NOTE — TELEPHONE ENCOUNTER
Pt's wife calling and provided below message. States he had lost 3 lbs in 5 days. She states he stopped fasting yesterday. She states they have a f/u apt on 5/17/21. She will re-check weight a few times/week and keep log and bring to apt.  She states he is

## 2021-04-28 NOTE — TELEPHONE ENCOUNTER
[facility] would like a call back to coordinate a date/time where Dr. Kurt Damon can speak to Dr. Ronald Huynh a psychiatrist regarding patient. Please call back.

## 2021-04-29 NOTE — TELEPHONE ENCOUNTER
I am available tomorrow Friday afternoon after 1 PM.  Can we schedule a time at that point. Otherwise we would need to wait until next week.

## 2021-04-30 NOTE — TELEPHONE ENCOUNTER
Dr Marina Pitts:   [facility] called back from Dr. Lilliana Dukes office. Unfortunately Dr. Lilliana Dukes not available today. Are you free Tuesday next week (8:40 or 1:40)? Or Wednesday 9am or 9:40am.   I told [facility] you have a full schedule Wednesday.   Not sure if Tuesday wo

## 2021-05-03 ENCOUNTER — TELEPHONE (OUTPATIENT)
Dept: INTERNAL MEDICINE CLINIC | Facility: CLINIC | Age: 71
End: 2021-05-03

## 2021-05-03 NOTE — TELEPHONE ENCOUNTER
Action Requested: Summary for Provider     []  Critical Lab, Recommendations Needed  [] Need Additional Advice  []   FYI    []   Need Orders  [] Need Medications Sent to Pharmacy  []  Other     SUMMARY: Patient's wife asking if patient can increase Lexpro

## 2021-05-03 NOTE — TELEPHONE ENCOUNTER
No answer at tel. # [phone] ext [facility] to confirm setting up this appt with Dr Guru Fisher with psychiatrist Tuesday at 1:40pm to 2 pm. Return nurse call to [phone] office hours Monday thru Friday 8am to 5pm, Sat 8am to 1pm.  Sta

## 2021-05-04 RX ORDER — ESCITALOPRAM OXALATE 10 MG/1
10 TABLET ORAL DAILY
Qty: 30 TABLET | Refills: 5 | Status: SHIPPED | OUTPATIENT
Start: 2021-05-04 | End: 2021-08-25

## 2021-05-04 NOTE — TELEPHONE ENCOUNTER
Spoke with Valentin from Dr. Dominik Hughes office and relayed Dr. Belkis Xie message below--she verbalizes understanding and agreement.     Relayed Dr. Belkis Xie cell and pager # for Dr. Arturo Bates to contact him today around 1:40-2 p.m. today    Sent to Dr. Zaragoza He as Elin Whitaker

## 2021-05-04 NOTE — TELEPHONE ENCOUNTER
I approve of the increase in the dose of Lexapro from 5 up to 10 mg.  Prescription sent to pharmacy. Continue other treatments. Follow-up with me either in person or on a video visit in 6 weeks.

## 2021-05-04 NOTE — TELEPHONE ENCOUNTER
Spoke with the patient,verified full name and  informed him and wife of message & instructions below, no further questions.   Future Appointments   Date Time Provider Marino Gomez   2021  4:00 PM Tish Caceres MD [facility]

## 2021-05-05 NOTE — TELEPHONE ENCOUNTER
Spouse following up on question for blood work before appointment on 5/17/2021. Would like to include Vitamin D as well. Please advise.

## 2021-05-05 NOTE — TELEPHONE ENCOUNTER
Spoke with wife. He would like her husbands blood work to be drawn at [facility]. Instructions given for him to fast for 12 hours. He can go to the [facility] and does not need an appointment.     Javy Thompson, ANP

## 2021-05-06 ENCOUNTER — LAB ENCOUNTER (OUTPATIENT)
Dept: LAB | Facility: HOSPITAL | Age: 71
End: 2021-05-06
Attending: NURSE PRACTITIONER
Payer: MEDICARE

## 2021-05-06 DIAGNOSIS — E11.9 TYPE 2 DIABETES MELLITUS WITHOUT RETINOPATHY (HCC): ICD-10-CM

## 2021-05-06 DIAGNOSIS — Z12.5 SCREENING FOR PROSTATE CANCER: ICD-10-CM

## 2021-05-06 DIAGNOSIS — E11.9 TYPE 2 DIABETES MELLITUS WITHOUT COMPLICATION, WITHOUT LONG-TERM CURRENT USE OF INSULIN (HCC): ICD-10-CM

## 2021-05-06 DIAGNOSIS — I10 ESSENTIAL HYPERTENSION: ICD-10-CM

## 2021-05-06 DIAGNOSIS — E55.9 VITAMIN D DEFICIENCY: ICD-10-CM

## 2021-05-06 PROCEDURE — 80053 COMPREHEN METABOLIC PANEL: CPT

## 2021-05-06 PROCEDURE — 80061 LIPID PANEL: CPT

## 2021-05-06 PROCEDURE — 84439 ASSAY OF FREE THYROXINE: CPT

## 2021-05-06 PROCEDURE — 82306 VITAMIN D 25 HYDROXY: CPT

## 2021-05-06 PROCEDURE — 36415 COLL VENOUS BLD VENIPUNCTURE: CPT

## 2021-05-06 PROCEDURE — 85025 COMPLETE CBC W/AUTO DIFF WBC: CPT

## 2021-05-06 PROCEDURE — 84443 ASSAY THYROID STIM HORMONE: CPT

## 2021-05-06 PROCEDURE — 82607 VITAMIN B-12: CPT

## 2021-05-17 ENCOUNTER — OFFICE VISIT (OUTPATIENT)
Dept: INTERNAL MEDICINE CLINIC | Facility: CLINIC | Age: 71
End: 2021-05-17
Payer: COMMERCIAL

## 2021-05-17 VITALS
HEIGHT: 71 IN | BODY MASS INDEX: 19.6 KG/M2 | WEIGHT: 140 LBS | RESPIRATION RATE: 18 BRPM | HEART RATE: 66 BPM | SYSTOLIC BLOOD PRESSURE: 114 MMHG | DIASTOLIC BLOOD PRESSURE: 72 MMHG

## 2021-05-17 DIAGNOSIS — F32.2 SEVERE DEPRESSION (HCC): ICD-10-CM

## 2021-05-17 DIAGNOSIS — I10 ESSENTIAL HYPERTENSION WITH GOAL BLOOD PRESSURE LESS THAN 140/90: ICD-10-CM

## 2021-05-17 DIAGNOSIS — E11.9 TYPE 2 DIABETES MELLITUS WITHOUT COMPLICATION, WITHOUT LONG-TERM CURRENT USE OF INSULIN (HCC): Primary | ICD-10-CM

## 2021-05-17 PROCEDURE — 83036 HEMOGLOBIN GLYCOSYLATED A1C: CPT | Performed by: INTERNAL MEDICINE

## 2021-05-17 PROCEDURE — 3008F BODY MASS INDEX DOCD: CPT | Performed by: INTERNAL MEDICINE

## 2021-05-17 PROCEDURE — 3078F DIAST BP <80 MM HG: CPT | Performed by: INTERNAL MEDICINE

## 2021-05-17 PROCEDURE — 99214 OFFICE O/P EST MOD 30 MIN: CPT | Performed by: INTERNAL MEDICINE

## 2021-05-17 PROCEDURE — 3074F SYST BP LT 130 MM HG: CPT | Performed by: INTERNAL MEDICINE

## 2021-05-17 RX ORDER — GLIPIZIDE 5 MG/1
5 TABLET, FILM COATED, EXTENDED RELEASE ORAL DAILY
Qty: 90 TABLET | Refills: 3 | Status: SHIPPED | OUTPATIENT
Start: 2021-05-17 | End: 2021-08-25

## 2021-05-17 NOTE — PROGRESS NOTES
HPI:    Patient ID: Reuben Gil is a 69year old male. HPI  Patient is here for follow-up on chronic medical issues as listed below. I last saw him on February 24. He was not doing particularly well at that time.   He was having issues with nonco Smoker        Packs/day: 0.25        Types: Cigarettes        Quit date: 2002        Years since quittin.3      Smokeless tobacco: Never Used    Vaping Use      Vaping Use: Never used    Alcohol use: No    Drug use: No         Review of Systems Conjunctiva/sclera: Conjunctivae normal.   Cardiovascular:      Rate and Rhythm: Normal rate and regular rhythm. Heart sounds: Normal heart sounds. No murmur heard.      Pulmonary:      Effort: Pulmonary effort is normal.      Breath sounds: Normal baljinder months. I encouraged the patient to get outside and walk around. Wife asked about going to the [employer] for midday prayers only lasting about 10 minutes.   I advised that I thought that was fine since they are vaccinated they just need to keep the mask on h

## 2021-06-08 ENCOUNTER — TELEPHONE (OUTPATIENT)
Dept: INTERNAL MEDICINE CLINIC | Facility: CLINIC | Age: 71
End: 2021-06-08

## 2021-06-08 RX ORDER — ZOLPIDEM TARTRATE 5 MG/1
TABLET ORAL
Qty: 30 TABLET | Refills: 0 | Status: SHIPPED | OUTPATIENT
Start: 2021-06-08 | End: 2021-07-08

## 2021-06-24 ENCOUNTER — TELEPHONE (OUTPATIENT)
Dept: INTERNAL MEDICINE CLINIC | Facility: CLINIC | Age: 71
End: 2021-06-24

## 2021-06-24 NOTE — TELEPHONE ENCOUNTER
Reached patient and wife, Evy Maldonado (ok per HIPAA) to discuss statin use in diabetes. Discussed guidelines and recommendations for statin therapy in patients with diabetes. Discussed risks vs benefits.  Wife mentions patient had been on atorvastatin in th

## 2021-07-08 ENCOUNTER — TELEPHONE (OUTPATIENT)
Dept: INTERNAL MEDICINE CLINIC | Facility: CLINIC | Age: 71
End: 2021-07-08

## 2021-07-08 RX ORDER — ZOLPIDEM TARTRATE 5 MG/1
TABLET ORAL
Qty: 30 TABLET | Refills: 0 | Status: SHIPPED | OUTPATIENT
Start: 2021-07-08 | End: 2021-08-02

## 2021-07-08 NOTE — TELEPHONE ENCOUNTER
Spoke with spouse ( verified)--asking if patient needs to complete any labs prior to 3 month f/u appt next month--he can complete, then discuss at appt.     Please respond to pool: [facility]IN [facility]    Future Appointments   Date Time Provider Department

## 2021-07-08 NOTE — TELEPHONE ENCOUNTER
Outpatient Medication Detail     Disp Refills Start End    ZOLPIDEM 5 MG Oral Tab 30 tablet 0 7/8/2021     Sig: TAKE 1 TABLET(5 MG) BY MOUTH EVERY NIGHT AS NEEDED FOR SLEEP    Sent to pharmacy as: Zolpidem Tartrate 5 MG Oral Tablet (AMBIEN)    E-Prescribin

## 2021-07-08 NOTE — TELEPHONE ENCOUNTER
Patient had full set of blood test done in May of this year. Everything looked good. There is no need for any additional testing right now. We may check the A1c when he comes into the office for his visit.

## 2021-08-02 NOTE — TELEPHONE ENCOUNTER
Please review. Protocol Failed / No Protocol.     Requested Prescriptions   Pending Prescriptions Disp Refills    ZOLPIDEM 5 MG Oral Tab [Pharmacy Med Name: ZOLPIDEM 5MG TABLETS] 30 tablet 0     Sig: TAKE 1 TABLET(5 MG) BY MOUTH EVERY NIGHT AS NEEDED FOR S

## 2021-08-03 RX ORDER — ZOLPIDEM TARTRATE 5 MG/1
5 TABLET ORAL NIGHTLY PRN
Qty: 30 TABLET | Refills: 0 | Status: SHIPPED | OUTPATIENT
Start: 2021-08-02 | End: 2021-08-25

## 2021-08-25 ENCOUNTER — OFFICE VISIT (OUTPATIENT)
Dept: INTERNAL MEDICINE CLINIC | Facility: CLINIC | Age: 71
End: 2021-08-25
Payer: COMMERCIAL

## 2021-08-25 VITALS
WEIGHT: 140 LBS | BODY MASS INDEX: 19.6 KG/M2 | SYSTOLIC BLOOD PRESSURE: 100 MMHG | HEIGHT: 71 IN | HEART RATE: 86 BPM | RESPIRATION RATE: 18 BRPM | TEMPERATURE: 98 F | DIASTOLIC BLOOD PRESSURE: 68 MMHG

## 2021-08-25 DIAGNOSIS — G31.9 BRAIN ATROPHY (HCC): ICD-10-CM

## 2021-08-25 DIAGNOSIS — F33.42 RECURRENT MAJOR DEPRESSIVE DISORDER, IN FULL REMISSION (HCC): ICD-10-CM

## 2021-08-25 DIAGNOSIS — E11.9 TYPE 2 DIABETES MELLITUS WITHOUT COMPLICATION, WITHOUT LONG-TERM CURRENT USE OF INSULIN (HCC): Primary | ICD-10-CM

## 2021-08-25 DIAGNOSIS — I10 ESSENTIAL HYPERTENSION WITH GOAL BLOOD PRESSURE LESS THAN 140/90: ICD-10-CM

## 2021-08-25 LAB
CARTRIDGE LOT#: 831 NUMERIC
HEMOGLOBIN A1C: 5.8 % (ref 4.3–5.6)

## 2021-08-25 PROCEDURE — 3044F HG A1C LEVEL LT 7.0%: CPT | Performed by: INTERNAL MEDICINE

## 2021-08-25 PROCEDURE — 3008F BODY MASS INDEX DOCD: CPT | Performed by: INTERNAL MEDICINE

## 2021-08-25 PROCEDURE — 3078F DIAST BP <80 MM HG: CPT | Performed by: INTERNAL MEDICINE

## 2021-08-25 PROCEDURE — 99214 OFFICE O/P EST MOD 30 MIN: CPT | Performed by: INTERNAL MEDICINE

## 2021-08-25 PROCEDURE — 83036 HEMOGLOBIN GLYCOSYLATED A1C: CPT | Performed by: INTERNAL MEDICINE

## 2021-08-25 PROCEDURE — 3074F SYST BP LT 130 MM HG: CPT | Performed by: INTERNAL MEDICINE

## 2021-08-25 RX ORDER — ZOLPIDEM TARTRATE 5 MG/1
5 TABLET ORAL NIGHTLY PRN
Qty: 90 TABLET | Refills: 0 | Status: SHIPPED | OUTPATIENT
Start: 2021-08-25 | End: 2021-10-07

## 2021-08-25 RX ORDER — ESCITALOPRAM OXALATE 5 MG/1
5 TABLET ORAL DAILY
Qty: 90 TABLET | Refills: 3 | Status: SHIPPED | OUTPATIENT
Start: 2021-08-25

## 2021-08-25 RX ORDER — LISINOPRIL 10 MG/1
10 TABLET ORAL
Qty: 90 TABLET | Refills: 3 | Status: SHIPPED | OUTPATIENT
Start: 2021-08-25 | End: 2021-12-01

## 2021-08-25 RX ORDER — GLIPIZIDE 5 MG/1
5 TABLET, FILM COATED, EXTENDED RELEASE ORAL DAILY
Qty: 90 TABLET | Refills: 3 | Status: SHIPPED | OUTPATIENT
Start: 2021-08-25 | End: 2021-12-01

## 2021-08-25 NOTE — PROGRESS NOTES
HPI:    Patient ID: Johnny Gray is a 69year old male. HPI  Patient is here for follow-up on chronic medical issues. Last seen here in May of this year. At that time depression was a little bit better on the Lexapro 10 mg a day.   We decided to Years since quittin.6      Smokeless tobacco: Never Used    Vaping Use      Vaping Use: Never used    Alcohol use: No    Drug use: No         Review of Systems          Current Outpatient Medications   Medication Sig Dispense Refill   • zolpidem 5 MG Pulmonary:      Effort: Pulmonary effort is normal.      Breath sounds: Normal breath sounds. No wheezing or rales. Abdominal:      General: Bowel sounds are normal.      Palpations: Abdomen is soft. There is no mass. Tenderness:  There is no abd Visit:  Requested Prescriptions     Pending Prescriptions Disp Refills   • zolpidem 5 MG Oral Tab 30 tablet 0     Sig: Take 1 tablet (5 mg total) by mouth nightly as needed for Sleep.        Imaging & Referrals:  None         Arely Donaldson MD

## 2021-09-21 ENCOUNTER — NURSE TRIAGE (OUTPATIENT)
Dept: INTERNAL MEDICINE CLINIC | Facility: CLINIC | Age: 71
End: 2021-09-21

## 2021-09-21 NOTE — TELEPHONE ENCOUNTER
Action Requested: Summary for Provider     []  Critical Lab, Recommendations Needed  [] Need Additional Advice  []   FYI    []   Need Orders  [] Need Medications Sent to Pharmacy  []  Other     SUMMARY: Patient c/o left arm pain below shoulder and above el

## 2021-10-07 NOTE — TELEPHONE ENCOUNTER
Please review; protocol failed/no protocol    Requested Prescriptions   Pending Prescriptions Disp Refills    ZOLPIDEM 5 MG Oral Tab [Pharmacy Med Name: ZOLPIDEM 5MG TABLETS] 90 tablet 0     Sig: TAKE 1 TABLET(5 MG) BY MOUTH EVERY NIGHT AS NEEDED FOR SLEEP

## 2021-10-08 RX ORDER — ZOLPIDEM TARTRATE 5 MG/1
5 TABLET ORAL NIGHTLY PRN
Qty: 90 TABLET | Refills: 0 | Status: SHIPPED | OUTPATIENT
Start: 2021-10-08 | End: 2021-12-09

## 2021-10-25 ENCOUNTER — IMMUNIZATION (OUTPATIENT)
Dept: LAB | Facility: HOSPITAL | Age: 71
End: 2021-10-25
Attending: EMERGENCY MEDICINE
Payer: MEDICARE

## 2021-10-25 DIAGNOSIS — Z23 NEED FOR VACCINATION: Primary | ICD-10-CM

## 2021-10-25 PROCEDURE — 0003A SARSCOV2 VAC 30MCG/0.3ML IM: CPT

## 2021-11-04 ENCOUNTER — IMMUNIZATION (OUTPATIENT)
Dept: INTERNAL MEDICINE CLINIC | Facility: CLINIC | Age: 71
End: 2021-11-04
Payer: COMMERCIAL

## 2021-11-04 DIAGNOSIS — Z23 NEED FOR VACCINATION: Primary | ICD-10-CM

## 2021-11-04 PROCEDURE — G0008 ADMIN INFLUENZA VIRUS VAC: HCPCS | Performed by: INTERNAL MEDICINE

## 2021-11-04 PROCEDURE — 90686 IIV4 VACC NO PRSV 0.5 ML IM: CPT | Performed by: INTERNAL MEDICINE

## 2021-11-15 ENCOUNTER — OFFICE VISIT (OUTPATIENT)
Dept: OPTOMETRY | Facility: CLINIC | Age: 71
End: 2021-11-15
Payer: COMMERCIAL

## 2021-11-15 DIAGNOSIS — E11.9 TYPE 2 DIABETES MELLITUS WITHOUT COMPLICATION, WITHOUT LONG-TERM CURRENT USE OF INSULIN (HCC): Primary | ICD-10-CM

## 2021-11-15 DIAGNOSIS — H25.13 AGE-RELATED NUCLEAR CATARACT OF BOTH EYES: ICD-10-CM

## 2021-11-15 DIAGNOSIS — H52.4 PRESBYOPIA: ICD-10-CM

## 2021-11-15 PROCEDURE — 92015 DETERMINE REFRACTIVE STATE: CPT | Performed by: OPTOMETRIST

## 2021-11-15 PROCEDURE — 92014 COMPRE OPH EXAM EST PT 1/>: CPT | Performed by: OPTOMETRIST

## 2021-11-15 NOTE — PATIENT INSTRUCTIONS
Type II diabetes mellitus ([facility].)  I advised patient that there is no background diabetic retinopathy in either eye and that they should continue to keep their blood sugar under control and continue to see their physician as directed.  I stressed the importan

## 2021-11-15 NOTE — PROGRESS NOTES
Gus Carter is a 69year old male.     HPI:     HPI     Diabetic Eye Exam     Diabetes Type: Type 2, controlled with diet and taking oral medications    Duration: 19 years    Number of years diabetic: 19    Number of years on pills: 19    Number of ye DAILY 100 strip 5   • Accu-Chek Softclix Lancets Does not apply Misc USE ONCE DAILY 100 each 3   • ergocalciferol 1.25 MG (49846 UT) Oral Cap Take 1 capsule (50,000 Units total) by mouth once a week.  (Patient taking differently: Take 50,000 Units by mouth Right Left    Disc Good rim, Temporal crescent Good rim, Temporal crescent    C/D Ratio 0.3 0.3    Macula Normal no BDR Normal no BDR    Vessels Normal Normal    Periphery Normal Normal            Refraction     Wearing Rx       Sphere Cylinder Axis Add

## 2021-11-26 ENCOUNTER — TELEPHONE (OUTPATIENT)
Dept: INTERNAL MEDICINE CLINIC | Facility: CLINIC | Age: 71
End: 2021-11-26

## 2021-11-26 DIAGNOSIS — E11.9 TYPE 2 DIABETES MELLITUS WITHOUT COMPLICATION, WITHOUT LONG-TERM CURRENT USE OF INSULIN (HCC): Primary | ICD-10-CM

## 2021-11-26 NOTE — TELEPHONE ENCOUNTER
Called Mrs. Kern, verified Mr. Shahab Leon first name and . Relayed message per Dr. Jose Key. Wife verbalized understanding and had no further questions.

## 2021-11-26 NOTE — TELEPHONE ENCOUNTER
Spoke with spouse ( verified)--asking Dr. Harsha Alberto for lab orders for patient to complete prior to 2021 physical--\"Whatever labs he wants to order, plus B12 and Vitamin D and also a gluten test. My  is refusing to eat so many things.  He can

## 2021-11-26 NOTE — TELEPHONE ENCOUNTER
Please call the wife. We have ordered blood tests.   Please note the patient had a full set of blood tests in early May so we do not need to repeat all of them as most of them were normal.  We will check the blood sugar, A1c, lipid profile, and thyroid fun

## 2021-11-29 ENCOUNTER — LAB ENCOUNTER (OUTPATIENT)
Dept: LAB | Age: 71
End: 2021-11-29
Attending: INTERNAL MEDICINE
Payer: MEDICARE

## 2021-11-29 DIAGNOSIS — E11.9 TYPE 2 DIABETES MELLITUS WITHOUT COMPLICATION, WITHOUT LONG-TERM CURRENT USE OF INSULIN (HCC): ICD-10-CM

## 2021-11-29 PROCEDURE — 3044F HG A1C LEVEL LT 7.0%: CPT | Performed by: INTERNAL MEDICINE

## 2021-11-29 PROCEDURE — 84439 ASSAY OF FREE THYROXINE: CPT

## 2021-11-29 PROCEDURE — 80061 LIPID PANEL: CPT

## 2021-11-29 PROCEDURE — 83036 HEMOGLOBIN GLYCOSYLATED A1C: CPT

## 2021-11-29 PROCEDURE — 84443 ASSAY THYROID STIM HORMONE: CPT

## 2021-11-29 PROCEDURE — 82947 ASSAY GLUCOSE BLOOD QUANT: CPT

## 2021-12-01 ENCOUNTER — OFFICE VISIT (OUTPATIENT)
Dept: INTERNAL MEDICINE CLINIC | Facility: CLINIC | Age: 71
End: 2021-12-01
Payer: COMMERCIAL

## 2021-12-01 VITALS
TEMPERATURE: 97 F | BODY MASS INDEX: 19.46 KG/M2 | SYSTOLIC BLOOD PRESSURE: 120 MMHG | RESPIRATION RATE: 18 BRPM | HEIGHT: 71 IN | WEIGHT: 139 LBS | HEART RATE: 88 BPM | DIASTOLIC BLOOD PRESSURE: 72 MMHG

## 2021-12-01 DIAGNOSIS — E11.9 TYPE 2 DIABETES MELLITUS WITHOUT COMPLICATION, WITHOUT LONG-TERM CURRENT USE OF INSULIN (HCC): ICD-10-CM

## 2021-12-01 DIAGNOSIS — R79.89 ABNORMAL THYROID BLOOD TEST: ICD-10-CM

## 2021-12-01 DIAGNOSIS — F33.42 RECURRENT MAJOR DEPRESSIVE DISORDER, IN FULL REMISSION (HCC): ICD-10-CM

## 2021-12-01 DIAGNOSIS — E55.9 VITAMIN D DEFICIENCY: ICD-10-CM

## 2021-12-01 DIAGNOSIS — I10 ESSENTIAL HYPERTENSION WITH GOAL BLOOD PRESSURE LESS THAN 140/90: ICD-10-CM

## 2021-12-01 DIAGNOSIS — Z00.00 ENCOUNTER FOR ANNUAL HEALTH EXAMINATION: Primary | ICD-10-CM

## 2021-12-01 PROCEDURE — 3008F BODY MASS INDEX DOCD: CPT | Performed by: INTERNAL MEDICINE

## 2021-12-01 PROCEDURE — G0439 PPPS, SUBSEQ VISIT: HCPCS | Performed by: INTERNAL MEDICINE

## 2021-12-01 PROCEDURE — 99397 PER PM REEVAL EST PAT 65+ YR: CPT | Performed by: INTERNAL MEDICINE

## 2021-12-01 PROCEDURE — 3074F SYST BP LT 130 MM HG: CPT | Performed by: INTERNAL MEDICINE

## 2021-12-01 PROCEDURE — 3078F DIAST BP <80 MM HG: CPT | Performed by: INTERNAL MEDICINE

## 2021-12-01 PROCEDURE — 96160 PT-FOCUSED HLTH RISK ASSMT: CPT | Performed by: INTERNAL MEDICINE

## 2021-12-01 RX ORDER — BLOOD SUGAR DIAGNOSTIC
STRIP MISCELLANEOUS
Qty: 100 STRIP | Refills: 5 | Status: SHIPPED | OUTPATIENT
Start: 2021-12-01

## 2021-12-01 RX ORDER — MIRTAZAPINE 7.5 MG/1
7.5 TABLET, FILM COATED ORAL NIGHTLY
Qty: 90 TABLET | Refills: 1 | Status: SHIPPED | OUTPATIENT
Start: 2021-12-01

## 2021-12-01 RX ORDER — MIRTAZAPINE 7.5 MG/1
7.5 TABLET, FILM COATED ORAL NIGHTLY
COMMUNITY
End: 2021-12-01

## 2021-12-01 RX ORDER — LANCETS
EACH MISCELLANEOUS
Qty: 100 EACH | Refills: 3 | Status: SHIPPED | OUTPATIENT
Start: 2021-12-01

## 2021-12-01 RX ORDER — GLIPIZIDE 5 MG/1
5 TABLET, FILM COATED, EXTENDED RELEASE ORAL DAILY
Qty: 90 TABLET | Refills: 3 | Status: SHIPPED | OUTPATIENT
Start: 2021-12-01

## 2021-12-01 RX ORDER — LISINOPRIL 10 MG/1
10 TABLET ORAL
Qty: 90 TABLET | Refills: 3 | Status: SHIPPED | OUTPATIENT
Start: 2021-12-01

## 2021-12-01 RX ORDER — ERGOCALCIFEROL 1.25 MG/1
50000 CAPSULE ORAL
Qty: 3 CAPSULE | Refills: 3 | Status: SHIPPED | OUTPATIENT
Start: 2021-12-01

## 2021-12-01 NOTE — PATIENT INSTRUCTIONS
Yefri Kern's SCREENING SCHEDULE   Tests on this list are recommended by your physician but may not be covered, or covered at this frequency, by your insurer. Please check with your insurance carrier before scheduling to verify coverage.    PREVEN Pneumococcal Each vaccine (Cllnjua10 & Bluskslct52) covered once after 65 Prevnar 13: 04/17/2018    Kwjaorrkc04: 07/24/2019     No recommendations at this time    Hepatitis B One screening covered for patients with certain risk factors   -  No recommendati including definitions of the different types of Advance Directives. It also has the State forms available on it's website for anyone to review and print using their home computer and printer. (the forms are also available in [address])  www. [employer]. or

## 2021-12-01 NOTE — PROGRESS NOTES
HPI:   Gustavo East is a 66year old male who presents for a MA (Medicare Advantage) Supervisit (Once per calendar year). Patient is here requesting Medicare advantage AHA visit and follow-up on chronic medical problems as listed below.   Last see performed Face to Face with patient and Family/surrogate (if present), and forms available to patient in AVS     He does NOT have a Power of  for [employer] on file in [address] [place] [address] Rd.    Advance care planning including the explanation and discussion of advan mirtazapine 7.5 MG Oral Tab, Take 7.5 mg by mouth nightly. glipiZIDE 5 MG Oral Tablet 24 Hr, Take 1 tablet (5 mg total) by mouth daily. lisinopril 10 MG Oral Tab, Take 1 tablet (10 mg total) by mouth once daily.   metFORMIN HCl 1000 MG Oral Tab, TAKE 1 trouble following the conversations when two or more people are talking at the same time: No   I have trouble understanding things on the TV: No I have to strain to understand conversations: No   I have to worry about missing the telephone ring or doorbell heard.      Pulmonary:      Effort: Pulmonary effort is normal.      Breath sounds: Normal breath sounds. No wheezing or rales. Abdominal:      General: Bowel sounds are normal.      Palpations: Abdomen is soft. There is no mass. Tenderness:  There i place.  Physically doing well. Active issues as below. Health maintenance issues reviewed. Advanced directives reviewed. 2. Type 2 diabetes mellitus without complication, without long-term current use of insulin ([facility]  Diabetes is well controlled.   A One screening every 12 months if never tested or if previously tested but not diagnosed with pre-diabetes   One screening every 6 months if diagnosed with pre-diabetes Lab Results   Component Value Date     (H) 11/29/2021        Cardiovascular Disea covered with your pharmacy prescription benefits -    Tetanus, Diptheria and Pertusis TD and TDaP Not covered by Medicare Part B -  No recommendations at this time    Zoster Not covered by Medicare Part B; may be covered with your pharmacy  prescription be

## 2021-12-09 RX ORDER — ZOLPIDEM TARTRATE 5 MG/1
TABLET ORAL
Qty: 90 TABLET | Refills: 0 | Status: SHIPPED | OUTPATIENT
Start: 2021-12-09

## 2022-03-01 RX ORDER — MIRTAZAPINE 7.5 MG/1
7.5 TABLET, FILM COATED ORAL NIGHTLY
Qty: 90 TABLET | Refills: 1 | Status: SHIPPED | OUTPATIENT
Start: 2022-03-01

## 2022-03-01 NOTE — TELEPHONE ENCOUNTER
With POP UP HIGH WARNING  between escitalopram  and mirtazapine, unable to approved per protocol     Refill passed per [address] [facility] protocol.      Requested Prescriptions   Pending Prescriptions Disp Refills    MIRTAZAPINE 7.5 MG Oral Tab [Pharmacy Med Name: MIRTAZAPINE 7.5MG TABLETS] 90 tablet 1     Sig: TAKE 1 TABLET(7.5 MG) BY MOUTH EVERY NIGHT        Psychiatric Non-Scheduled (Anti-Anxiety) Passed - 2/28/2022  9:57 PM        Passed - Appointment in last 6 or next 3 months                    Recent Outpatient Visits              2 months ago Encounter for annual health examination    [address], [address], Gonzalo Vargas MD    Office Visit    3 months ago Type 2 diabetes mellitus without complication, without long-term current use of insulin [facility])    [facility] [address] Visit    6 months ago Type 2 diabetes mellitus without complication, without long-term current use of insulin [facility])    [address] [facility] [place], [address], Roseann Newman MD    Office Visit    9 months ago Type 2 diabetes mellitus without complication, without long-term current use of insulin [facility])    [address] [place], [address], Roseann Newman MD    Office Visit    1 year ago Severe depression [facility])    [address] [place], [address], Roseann Newman MD    Office Visit

## 2022-03-02 NOTE — TELEPHONE ENCOUNTER
Refill passed per [facility] protocol.     Requested Prescriptions   Pending Prescriptions Disp Refills    METFORMIN 500 MG Oral Tab [Pharmacy Med Name: METFORMIN 500MG TABLETS] 180 tablet 1     Sig: TAKE 1 TABLET(500 MG) BY MOUTH TWICE DAILY WITH MEALS        Diabetes Medication Protocol Passed - 3/2/2022  8:07 AM        Passed - Last A1C < 7.5 and within past 6 months     Lab Results   Component Value Date    A1C 5.7 (H) 11/29/2021               Passed - Appointment in past 6 or next 3 months        Passed - GFR Non- > 50     Lab Results   Component Value Date    GFRNAA 88 05/06/2021                 Passed - GFR in the past 12 months              Recent Outpatient Visits              3 months ago Encounter for annual health examination    [facility], [address], Della Lazo MD    Office Visit    3 months ago Type 2 diabetes mellitus without complication, without long-term current use of insulin [facility])    [facility] for [address] Visit    6 months ago Type 2 diabetes mellitus without complication, without long-term current use of insulin [facility])    [facility], [address], Edwin Newman MD    Office Visit    9 months ago Type 2 diabetes mellitus without complication, without long-term current use of insulin [facility])    [facility], [address], Edwin Newman MD    Office Visit    1 year ago Severe depression [facility])    [facility], [address], Edwin Newman MD    Office Visit

## 2022-03-16 ENCOUNTER — TELEPHONE (OUTPATIENT)
Dept: INTERNAL MEDICINE CLINIC | Facility: CLINIC | Age: 72
End: 2022-03-16

## 2022-03-16 NOTE — TELEPHONE ENCOUNTER
Patient due for medicare px in Dec with Dr. Pradip Hillman, patient state will schedule when time gets closer

## 2022-04-13 ENCOUNTER — TELEPHONE (OUTPATIENT)
Dept: INTERNAL MEDICINE CLINIC | Facility: CLINIC | Age: 72
End: 2022-04-13

## 2022-04-13 NOTE — TELEPHONE ENCOUNTER
Spouse, made an appointment for the patient to see Dr. Leatha Mandel on 5-23-21 for a diabetic follow up. Spouse, would like to know if the doctor would like him to do blood work prior to the appointment. Please, call when the orders are entered.

## 2022-04-15 NOTE — TELEPHONE ENCOUNTER
Ongoing SW/CM Assessment/Plan of Care Note     See SW/CM flowsheets for goals and other objective data.    Patient/Family discharge goal (s):  Goal #1:  (Currently unable to eval)          PT Recommendation:     Recommendation for Discharge: PT [place]: Patient is appropriate for daily Physical Therapy    OT Recommendation:     Recommendations for Discharge: OT [place]: Patient is appropriate for daily Occupational Therapy      Progress note:   Spoke with Jonny. She reports that they are still working on insurance authorization. She does not feel that it will happen today as the insurance companies are only working 1/2 a day due to the holiday weekend. SW to continue to follow up. Updated notes have been sent.     LSW, LCSW         Patient's wife callingGOOD verified, she is requesting refill for zolpidem, they received a message from the pharmacy that the prescription had been denied.      Pharmacy contacted, they received the rx this morning and it is awaiting approval by the pha

## 2022-05-11 ENCOUNTER — LAB ENCOUNTER (OUTPATIENT)
Dept: LAB | Age: 72
End: 2022-05-11
Attending: INTERNAL MEDICINE
Payer: MEDICARE

## 2022-05-11 DIAGNOSIS — Z12.5 SCREENING FOR PROSTATE CANCER: ICD-10-CM

## 2022-05-11 DIAGNOSIS — E11.9 TYPE 2 DIABETES MELLITUS WITHOUT COMPLICATION, WITHOUT LONG-TERM CURRENT USE OF INSULIN (HCC): ICD-10-CM

## 2022-05-11 LAB
ALBUMIN SERPL-MCNC: 3.8 G/DL (ref 3.4–5)
ALBUMIN/GLOB SERPL: 1 {RATIO} (ref 1–2)
ALP LIVER SERPL-CCNC: 70 U/L
ALT SERPL-CCNC: 28 U/L
ANION GAP SERPL CALC-SCNC: 6 MMOL/L (ref 0–18)
AST SERPL-CCNC: 22 U/L (ref 15–37)
BASOPHILS # BLD AUTO: 0.04 X10(3) UL (ref 0–0.2)
BASOPHILS NFR BLD AUTO: 0.5 %
BILIRUB SERPL-MCNC: 0.6 MG/DL (ref 0.1–2)
BUN BLD-MCNC: 15 MG/DL (ref 7–18)
CALCIUM BLD-MCNC: 9.1 MG/DL (ref 8.5–10.1)
CHLORIDE SERPL-SCNC: 105 MMOL/L (ref 98–112)
CHOLEST SERPL-MCNC: 169 MG/DL (ref ?–200)
CO2 SERPL-SCNC: 28 MMOL/L (ref 21–32)
COMPLEXED PSA SERPL-MCNC: 4.28 NG/ML (ref ?–4)
CREAT BLD-MCNC: 1.06 MG/DL
CREAT UR-SCNC: 109 MG/DL
EOSINOPHIL # BLD AUTO: 0.45 X10(3) UL (ref 0–0.7)
EOSINOPHIL NFR BLD AUTO: 5.5 %
ERYTHROCYTE [DISTWIDTH] IN BLOOD BY AUTOMATED COUNT: 13.5 %
EST. AVERAGE GLUCOSE BLD GHB EST-MCNC: 123 MG/DL (ref 68–126)
FASTING PATIENT LIPID ANSWER: YES
FASTING STATUS PATIENT QL REPORTED: YES
GLOBULIN PLAS-MCNC: 3.7 G/DL (ref 2.8–4.4)
GLUCOSE BLD-MCNC: 82 MG/DL (ref 70–99)
HBA1C MFR BLD: 5.9 % (ref ?–5.7)
HCT VFR BLD AUTO: 40.7 %
HDLC SERPL-MCNC: 61 MG/DL (ref 40–59)
HGB BLD-MCNC: 13.1 G/DL
IMM GRANULOCYTES # BLD AUTO: 0.02 X10(3) UL (ref 0–1)
IMM GRANULOCYTES NFR BLD: 0.2 %
LDLC SERPL CALC-MCNC: 91 MG/DL (ref ?–100)
LYMPHOCYTES # BLD AUTO: 2.93 X10(3) UL (ref 1–4)
LYMPHOCYTES NFR BLD AUTO: 35.6 %
MCH RBC QN AUTO: 31.2 PG (ref 26–34)
MCHC RBC AUTO-ENTMCNC: 32.2 G/DL (ref 31–37)
MCV RBC AUTO: 96.9 FL
MICROALBUMIN UR-MCNC: 0.57 MG/DL
MICROALBUMIN/CREAT 24H UR-RTO: 5.2 UG/MG (ref ?–30)
MONOCYTES # BLD AUTO: 0.68 X10(3) UL (ref 0.1–1)
MONOCYTES NFR BLD AUTO: 8.3 %
NEUTROPHILS # BLD AUTO: 4.11 X10 (3) UL (ref 1.5–7.7)
NEUTROPHILS # BLD AUTO: 4.11 X10(3) UL (ref 1.5–7.7)
NEUTROPHILS NFR BLD AUTO: 49.9 %
NONHDLC SERPL-MCNC: 108 MG/DL (ref ?–130)
OSMOLALITY SERPL CALC.SUM OF ELEC: 288 MOSM/KG (ref 275–295)
PLATELET # BLD AUTO: 228 10(3)UL (ref 150–450)
POTASSIUM SERPL-SCNC: 4 MMOL/L (ref 3.5–5.1)
PROT SERPL-MCNC: 7.5 G/DL (ref 6.4–8.2)
RBC # BLD AUTO: 4.2 X10(6)UL
SODIUM SERPL-SCNC: 139 MMOL/L (ref 136–145)
T4 FREE SERPL-MCNC: 0.9 NG/DL (ref 0.8–1.7)
TRIGL SERPL-MCNC: 93 MG/DL (ref 30–149)
TSI SER-ACNC: 11.2 MIU/ML (ref 0.36–3.74)
VLDLC SERPL CALC-MCNC: 15 MG/DL (ref 0–30)
WBC # BLD AUTO: 8.2 X10(3) UL (ref 4–11)

## 2022-05-11 PROCEDURE — 84443 ASSAY THYROID STIM HORMONE: CPT

## 2022-05-11 PROCEDURE — 80053 COMPREHEN METABOLIC PANEL: CPT

## 2022-05-11 PROCEDURE — 82043 UR ALBUMIN QUANTITATIVE: CPT

## 2022-05-11 PROCEDURE — 3044F HG A1C LEVEL LT 7.0%: CPT | Performed by: INTERNAL MEDICINE

## 2022-05-11 PROCEDURE — 84439 ASSAY OF FREE THYROXINE: CPT

## 2022-05-11 PROCEDURE — 82570 ASSAY OF URINE CREATININE: CPT

## 2022-05-11 PROCEDURE — 85025 COMPLETE CBC W/AUTO DIFF WBC: CPT

## 2022-05-11 PROCEDURE — 83036 HEMOGLOBIN GLYCOSYLATED A1C: CPT

## 2022-05-11 PROCEDURE — 3061F NEG MICROALBUMINURIA REV: CPT | Performed by: INTERNAL MEDICINE

## 2022-05-11 PROCEDURE — 80061 LIPID PANEL: CPT

## 2022-05-23 ENCOUNTER — TELEPHONE (OUTPATIENT)
Dept: INTERNAL MEDICINE CLINIC | Facility: CLINIC | Age: 72
End: 2022-05-23

## 2022-05-23 ENCOUNTER — OFFICE VISIT (OUTPATIENT)
Dept: INTERNAL MEDICINE CLINIC | Facility: CLINIC | Age: 72
End: 2022-05-23
Payer: COMMERCIAL

## 2022-05-23 VITALS
HEIGHT: 71 IN | RESPIRATION RATE: 18 BRPM | WEIGHT: 140 LBS | HEART RATE: 78 BPM | DIASTOLIC BLOOD PRESSURE: 70 MMHG | TEMPERATURE: 98 F | BODY MASS INDEX: 19.6 KG/M2 | SYSTOLIC BLOOD PRESSURE: 116 MMHG

## 2022-05-23 DIAGNOSIS — I10 ESSENTIAL HYPERTENSION WITH GOAL BLOOD PRESSURE LESS THAN 140/90: Primary | ICD-10-CM

## 2022-05-23 DIAGNOSIS — E03.9 HYPOTHYROIDISM, UNSPECIFIED TYPE: ICD-10-CM

## 2022-05-23 DIAGNOSIS — G31.9 BRAIN ATROPHY (HCC): ICD-10-CM

## 2022-05-23 DIAGNOSIS — E11.9 TYPE 2 DIABETES MELLITUS WITHOUT COMPLICATION, WITHOUT LONG-TERM CURRENT USE OF INSULIN (HCC): ICD-10-CM

## 2022-05-23 DIAGNOSIS — F32.A DEPRESSION, UNSPECIFIED DEPRESSION TYPE: ICD-10-CM

## 2022-05-23 PROCEDURE — 3008F BODY MASS INDEX DOCD: CPT | Performed by: INTERNAL MEDICINE

## 2022-05-23 PROCEDURE — 99214 OFFICE O/P EST MOD 30 MIN: CPT | Performed by: INTERNAL MEDICINE

## 2022-05-23 PROCEDURE — 3074F SYST BP LT 130 MM HG: CPT | Performed by: INTERNAL MEDICINE

## 2022-05-23 PROCEDURE — 3078F DIAST BP <80 MM HG: CPT | Performed by: INTERNAL MEDICINE

## 2022-05-23 RX ORDER — GLIPIZIDE 5 MG/1
5 TABLET, FILM COATED, EXTENDED RELEASE ORAL DAILY
Qty: 90 TABLET | Refills: 3 | Status: SHIPPED | OUTPATIENT
Start: 2022-05-23

## 2022-05-23 RX ORDER — MIRTAZAPINE 15 MG/1
15 TABLET, FILM COATED ORAL NIGHTLY
Qty: 90 TABLET | Refills: 1 | Status: SHIPPED | OUTPATIENT
Start: 2022-05-23

## 2022-05-23 RX ORDER — LEVOTHYROXINE SODIUM 0.05 MG/1
50 TABLET ORAL
Qty: 90 TABLET | Refills: 1 | Status: SHIPPED | OUTPATIENT
Start: 2022-05-23

## 2022-05-23 RX ORDER — ZOLPIDEM TARTRATE 5 MG/1
5 TABLET ORAL NIGHTLY PRN
Qty: 90 TABLET | Refills: 3 | Status: CANCELLED | OUTPATIENT
Start: 2022-05-23

## 2022-05-23 RX ORDER — LISINOPRIL 10 MG/1
10 TABLET ORAL
Qty: 90 TABLET | Refills: 3 | Status: SHIPPED | OUTPATIENT
Start: 2022-05-23

## 2022-05-23 RX ORDER — ZOLPIDEM TARTRATE 5 MG/1
5 TABLET ORAL NIGHTLY PRN
Qty: 30 TABLET | Refills: 0 | Status: SHIPPED | OUTPATIENT
Start: 2022-05-23

## 2022-05-23 NOTE — TELEPHONE ENCOUNTER
Pt's wife called stated pt has an appt today    She wants to make you aware she feels he's getting dementia  He's started asking same questions over and over    Did not know how to write a check which is totally different   beginning to get paranoid  She will be with him but do not want him to hear her say this

## 2022-05-26 ENCOUNTER — IMMUNIZATION (OUTPATIENT)
Dept: LAB | Age: 72
End: 2022-05-26
Attending: EMERGENCY MEDICINE
Payer: MEDICARE

## 2022-05-26 DIAGNOSIS — Z23 NEED FOR VACCINATION: Primary | ICD-10-CM

## 2022-05-26 PROCEDURE — 0054A SARSCOV2 VAC 30MCG TRS SUCR: CPT

## 2022-06-17 NOTE — TELEPHONE ENCOUNTER
Dr Jose Key, please advise. Wife (MARYCARMEN) stated that she spoke to Forms Dept, was told that you have forms and need to sign them.  Wife stated that 's paycheck is being held, needs forms completed and in, and back to Forms Dept so it can sent and rec
Dr. Dee Moreno,    Please sign off on the disab forms from the 6/23/20 phone encounter for Forms Completion.     Thank you,  [facility]
Dr. Zaragoza He please advise message below.
Forms signed
Per wife of patient, patient need the form as soon as possible due to his work is holding back his pay check.
Where are the forms? I do not see them on my desk. I will sign them as soon as I get them. Did Forms/MARYCARMEN take care of them?
17-Jun-2022 15:58

## 2022-07-08 ENCOUNTER — TELEPHONE (OUTPATIENT)
Dept: INTERNAL MEDICINE CLINIC | Facility: CLINIC | Age: 72
End: 2022-07-08

## 2022-07-08 NOTE — TELEPHONE ENCOUNTER
Patient's wife is requesting sooner appointment, as 08/26/22 appointment was canceled due to provider, and next available appointment is not until 9/23/22.

## 2022-07-13 NOTE — TELEPHONE ENCOUNTER
Please call the patient. Advise that we are working on opening up some additional slots for patients that need to be seen sooner. We will call back in the coming days with some options.

## 2022-07-14 ENCOUNTER — OFFICE VISIT (OUTPATIENT)
Dept: INTERNAL MEDICINE CLINIC | Facility: CLINIC | Age: 72
End: 2022-07-14
Payer: COMMERCIAL

## 2022-07-14 VITALS
RESPIRATION RATE: 18 BRPM | HEART RATE: 82 BPM | SYSTOLIC BLOOD PRESSURE: 100 MMHG | HEIGHT: 71 IN | DIASTOLIC BLOOD PRESSURE: 64 MMHG | WEIGHT: 139.38 LBS | BODY MASS INDEX: 19.51 KG/M2

## 2022-07-14 DIAGNOSIS — E11.9 TYPE 2 DIABETES MELLITUS WITHOUT COMPLICATION, WITHOUT LONG-TERM CURRENT USE OF INSULIN (HCC): Primary | ICD-10-CM

## 2022-07-14 DIAGNOSIS — G31.9 BRAIN ATROPHY (HCC): ICD-10-CM

## 2022-07-14 DIAGNOSIS — I10 ESSENTIAL HYPERTENSION WITH GOAL BLOOD PRESSURE LESS THAN 140/90: ICD-10-CM

## 2022-07-14 DIAGNOSIS — E55.9 VITAMIN D DEFICIENCY: ICD-10-CM

## 2022-07-14 DIAGNOSIS — F33.42 RECURRENT MAJOR DEPRESSIVE DISORDER, IN FULL REMISSION (HCC): ICD-10-CM

## 2022-07-14 DIAGNOSIS — E03.9 HYPOTHYROIDISM, UNSPECIFIED TYPE: ICD-10-CM

## 2022-07-14 PROCEDURE — 99214 OFFICE O/P EST MOD 30 MIN: CPT | Performed by: INTERNAL MEDICINE

## 2022-07-14 PROCEDURE — 3078F DIAST BP <80 MM HG: CPT | Performed by: INTERNAL MEDICINE

## 2022-07-14 PROCEDURE — 1126F AMNT PAIN NOTED NONE PRSNT: CPT | Performed by: INTERNAL MEDICINE

## 2022-07-14 PROCEDURE — 3008F BODY MASS INDEX DOCD: CPT | Performed by: INTERNAL MEDICINE

## 2022-07-14 PROCEDURE — 3074F SYST BP LT 130 MM HG: CPT | Performed by: INTERNAL MEDICINE

## 2022-07-14 RX ORDER — MIRTAZAPINE 15 MG/1
15 TABLET, FILM COATED ORAL NIGHTLY
Qty: 90 TABLET | Refills: 1 | Status: SHIPPED | OUTPATIENT
Start: 2022-07-14

## 2022-07-15 ENCOUNTER — TELEPHONE (OUTPATIENT)
Dept: INTERNAL MEDICINE CLINIC | Facility: CLINIC | Age: 72
End: 2022-07-15

## 2022-08-15 ENCOUNTER — OFFICE VISIT (OUTPATIENT)
Dept: INTERNAL MEDICINE CLINIC | Facility: CLINIC | Age: 72
End: 2022-08-15
Payer: COMMERCIAL

## 2022-08-15 ENCOUNTER — LAB ENCOUNTER (OUTPATIENT)
Dept: LAB | Facility: HOSPITAL | Age: 72
End: 2022-08-15
Attending: INTERNAL MEDICINE
Payer: MEDICARE

## 2022-08-15 ENCOUNTER — NURSE TRIAGE (OUTPATIENT)
Dept: INTERNAL MEDICINE CLINIC | Facility: CLINIC | Age: 72
End: 2022-08-15

## 2022-08-15 VITALS
HEART RATE: 88 BPM | DIASTOLIC BLOOD PRESSURE: 56 MMHG | HEIGHT: 71 IN | RESPIRATION RATE: 18 BRPM | WEIGHT: 138 LBS | TEMPERATURE: 98 F | BODY MASS INDEX: 19.32 KG/M2 | SYSTOLIC BLOOD PRESSURE: 104 MMHG

## 2022-08-15 DIAGNOSIS — G25.81 RESTLESS LEGS: ICD-10-CM

## 2022-08-15 DIAGNOSIS — M10.9 ACUTE GOUT INVOLVING TOE OF LEFT FOOT, UNSPECIFIED CAUSE: Primary | ICD-10-CM

## 2022-08-15 DIAGNOSIS — E03.9 HYPOTHYROIDISM, UNSPECIFIED TYPE: ICD-10-CM

## 2022-08-15 DIAGNOSIS — M10.9 ACUTE GOUT INVOLVING TOE OF LEFT FOOT, UNSPECIFIED CAUSE: ICD-10-CM

## 2022-08-15 DIAGNOSIS — I10 ESSENTIAL HYPERTENSION WITH GOAL BLOOD PRESSURE LESS THAN 140/90: ICD-10-CM

## 2022-08-15 DIAGNOSIS — E55.9 VITAMIN D DEFICIENCY: ICD-10-CM

## 2022-08-15 DIAGNOSIS — E11.9 TYPE 2 DIABETES MELLITUS WITHOUT COMPLICATION, WITHOUT LONG-TERM CURRENT USE OF INSULIN (HCC): ICD-10-CM

## 2022-08-15 LAB
BASOPHILS # BLD AUTO: 0.02 X10(3) UL (ref 0–0.2)
BASOPHILS NFR BLD AUTO: 0.2 %
CARTRIDGE LOT#: 981 NUMERIC
DEPRECATED RDW RBC AUTO: 43.7 FL (ref 35.1–46.3)
EOSINOPHIL # BLD AUTO: 0.19 X10(3) UL (ref 0–0.7)
EOSINOPHIL NFR BLD AUTO: 1.7 %
ERYTHROCYTE [DISTWIDTH] IN BLOOD BY AUTOMATED COUNT: 12.8 % (ref 11–15)
HCT VFR BLD AUTO: 38.2 %
HEMOGLOBIN A1C: 5.6 % (ref 4.3–5.6)
HGB BLD-MCNC: 13 G/DL
IMM GRANULOCYTES # BLD AUTO: 0.02 X10(3) UL (ref 0–1)
IMM GRANULOCYTES NFR BLD: 0.2 %
IRON SATN MFR SERPL: 9 %
IRON SERPL-MCNC: 29 UG/DL
LYMPHOCYTES # BLD AUTO: 2.58 X10(3) UL (ref 1–4)
LYMPHOCYTES NFR BLD AUTO: 23.5 %
MCH RBC QN AUTO: 31.7 PG (ref 26–34)
MCHC RBC AUTO-ENTMCNC: 34 G/DL (ref 31–37)
MCV RBC AUTO: 93.2 FL
MONOCYTES # BLD AUTO: 1.01 X10(3) UL (ref 0.1–1)
MONOCYTES NFR BLD AUTO: 9.2 %
NEUTROPHILS # BLD AUTO: 7.18 X10 (3) UL (ref 1.5–7.7)
NEUTROPHILS # BLD AUTO: 7.18 X10(3) UL (ref 1.5–7.7)
NEUTROPHILS NFR BLD AUTO: 65.2 %
PLATELET # BLD AUTO: 211 10(3)UL (ref 150–450)
RBC # BLD AUTO: 4.1 X10(6)UL
TIBC SERPL-MCNC: 331 UG/DL (ref 240–450)
TRANSFERRIN SERPL-MCNC: 222 MG/DL (ref 200–360)
TSI SER-ACNC: <0.005 MIU/ML (ref 0.36–3.74)
URATE SERPL-MCNC: 7.4 MG/DL
VIT B12 SERPL-MCNC: 511 PG/ML (ref 193–986)
VIT D+METAB SERPL-MCNC: 49.6 NG/ML (ref 30–100)
WBC # BLD AUTO: 11 X10(3) UL (ref 4–11)

## 2022-08-15 PROCEDURE — 84443 ASSAY THYROID STIM HORMONE: CPT

## 2022-08-15 PROCEDURE — 82306 VITAMIN D 25 HYDROXY: CPT

## 2022-08-15 PROCEDURE — 84466 ASSAY OF TRANSFERRIN: CPT

## 2022-08-15 PROCEDURE — 36415 COLL VENOUS BLD VENIPUNCTURE: CPT

## 2022-08-15 PROCEDURE — 85025 COMPLETE CBC W/AUTO DIFF WBC: CPT

## 2022-08-15 PROCEDURE — 82607 VITAMIN B-12: CPT

## 2022-08-15 PROCEDURE — 84550 ASSAY OF BLOOD/URIC ACID: CPT

## 2022-08-15 PROCEDURE — 83540 ASSAY OF IRON: CPT

## 2022-08-15 RX ORDER — INDOMETHACIN 50 MG/1
50 CAPSULE ORAL
Qty: 30 CAPSULE | Refills: 1 | Status: SHIPPED | OUTPATIENT
Start: 2022-08-15

## 2022-08-15 NOTE — TELEPHONE ENCOUNTER
Spoke with wife and informed of appointment this afternoon.   Future Appointments   Date Time Provider Marino Gomez   8/15/2022  4:40 PM Gloria Du MD [facility]   10/24/2022  2:20 PM Gloria Du MD [facility]

## 2022-08-17 ENCOUNTER — TELEPHONE (OUTPATIENT)
Dept: INTERNAL MEDICINE CLINIC | Facility: CLINIC | Age: 72
End: 2022-08-17

## 2022-08-17 DIAGNOSIS — E61.1 IRON DEFICIENCY: Primary | ICD-10-CM

## 2022-08-17 NOTE — TELEPHONE ENCOUNTER
Spoke with patient. He confirmed his is on 50 mcg levothyroxine. New rx pended for signature along with GI referral.  Patient will obtain slow fe OTC. He verbalized understanding of all instructions.

## 2022-08-18 RX ORDER — LEVOTHYROXINE SODIUM 0.03 MG/1
25 TABLET ORAL
Qty: 90 TABLET | Refills: 0 | Status: SHIPPED | OUTPATIENT
Start: 2022-08-18

## 2022-08-18 RX ORDER — LISINOPRIL 5 MG/1
5 TABLET ORAL
Qty: 90 TABLET | Refills: 1 | Status: SHIPPED | OUTPATIENT
Start: 2022-08-18

## 2022-08-18 NOTE — TELEPHONE ENCOUNTER
Patient needs to be seen within the next couple of weeks at the GI clinic. Please advise the patient that it is okay to see a midlevel provider. They will be the [place] to the GI physician. In no way is it okay for the patient to wait until January to be seen for this.

## 2022-08-18 NOTE — TELEPHONE ENCOUNTER
Dr. Summers Loud: per wife (MARYCARMEN confirmed) patient was told at last visit Lisinopril could be decreased to 5mg. Pended if okay. Per wife patient was not able to get appointment with gastro until 1/4/23 and they would like to know if this is okay? They have been added to the wait list but do not want to see mid-level.

## 2022-08-19 NOTE — TELEPHONE ENCOUNTER
Patient's wife contacted (name and  of patient verified). Dr. Carlos Angeles message reviewed. Wife verbalizes understanding; denies further questions and agrees with plan of care. States she will call GI office tomorrow and request earlier appointment with a nurse practitioner or [de-identified] assistant. Instructed wife to call back with any issues, verbalized understanding.

## 2022-08-19 NOTE — TELEPHONE ENCOUNTER
Tentatively scheduled for next Tuesday,  at 10:30 am with Dr. Jo Ann Morales. Spoke with Mayank casper/ patient  confirmation. Accepted appointment offered and appreciative for reaching out. Verified time, location and to arrive 15 minutes early. Patient expressed understanding with no further questions or concerns at this time.      Future Appointments   Date Time Provider Marino Gomez   2022 10:30 AM Jah Leigh MD [facility] Valeri WRAY

## 2022-08-19 NOTE — TELEPHONE ENCOUNTER
Patient's wife calling to follow up, she has been calling GI multiple times per Dr. Steve Mejia instructions, the soonest available appt she secured = 9/21/22    Wife is tearful, scared about the low iron levels. Patient is taking iron supplements.

## 2022-08-23 ENCOUNTER — TELEPHONE (OUTPATIENT)
Dept: GASTROENTEROLOGY | Facility: CLINIC | Age: 72
End: 2022-08-23

## 2022-08-23 ENCOUNTER — OFFICE VISIT (OUTPATIENT)
Dept: GASTROENTEROLOGY | Facility: CLINIC | Age: 72
End: 2022-08-23
Payer: COMMERCIAL

## 2022-08-23 VITALS
BODY MASS INDEX: 19.04 KG/M2 | SYSTOLIC BLOOD PRESSURE: 116 MMHG | DIASTOLIC BLOOD PRESSURE: 73 MMHG | WEIGHT: 136 LBS | HEIGHT: 71 IN | HEART RATE: 80 BPM

## 2022-08-23 DIAGNOSIS — R63.4 WEIGHT LOSS: ICD-10-CM

## 2022-08-23 DIAGNOSIS — D50.9 IRON DEFICIENCY ANEMIA, UNSPECIFIED IRON DEFICIENCY ANEMIA TYPE: Primary | ICD-10-CM

## 2022-08-23 DIAGNOSIS — R13.19 ESOPHAGEAL DYSPHAGIA: ICD-10-CM

## 2022-08-23 PROCEDURE — 99204 OFFICE O/P NEW MOD 45 MIN: CPT | Performed by: STUDENT IN AN ORGANIZED HEALTH CARE EDUCATION/TRAINING PROGRAM

## 2022-08-23 PROCEDURE — 3074F SYST BP LT 130 MM HG: CPT | Performed by: STUDENT IN AN ORGANIZED HEALTH CARE EDUCATION/TRAINING PROGRAM

## 2022-08-23 PROCEDURE — 3008F BODY MASS INDEX DOCD: CPT | Performed by: STUDENT IN AN ORGANIZED HEALTH CARE EDUCATION/TRAINING PROGRAM

## 2022-08-23 PROCEDURE — 3078F DIAST BP <80 MM HG: CPT | Performed by: STUDENT IN AN ORGANIZED HEALTH CARE EDUCATION/TRAINING PROGRAM

## 2022-08-23 NOTE — TELEPHONE ENCOUNTER
Scheduled for:  Colonoscopy [address], EGD [address]  Provider Name: Melchor Marquez   Date:  10/6/2022  Location:   [facility]  Sedation:  MAC  Time:  8:30 am,(pt is aware that [facility] will call the day before to confirm arrival time)  Prep: Suprep    Meds/Allergies Reconciled?:  Physician reviewed  Diagnosis with codes:  Iron Deficiency Anemia D50.9  Was patient informed to call insurance with codes (Y/N): YES   Referral sent?:  YES  [facility] or [facility] notified?:  Electronic case request was sent to [facility] via [employer]. Medication Orders: Hold all vitamins and supplements 7 days prior to procedure. Misc Orders: Patient was informed that they will need a COVID 19 test prior to their procedure. Patient verbally understood & will await a phone call from [facility] to schedule. Further instructions given by staff:  I discussed the prep instructions with the patient which he verbally understood. Provided patient with prep instructions.

## 2022-08-23 NOTE — PATIENT INSTRUCTIONS
1. Schedule with dietician    1. Schedule colonoscopy with MAC [Diagnosis: Iron deficiency anemia]    2.  bowel prep from pharmacy: Split dose suprep, if suprep not covered then golytely or moviprep    3. Continue all medications for procedure (discuss iron)    4. Read all bowel prep instructions carefully    5. AVOID seeds, nuts, popcorn, raw fruits and vegetables (cooked is okay) for 2-3 days before procedure    6. You MAY need to go for COVID testing 72 hours before procedure. The testing team will call you a few days before your procedure to discuss with you if testing is required. If you are asked to go for COVID testing and do not completed the test, the procedure cannot be performed. 7. If you start any NEW medication after your visit today, please notify us. Certain medications will need to be held before the procedure, or the procedure cannot be performed.     ____________________________________     1. Schedule upper endoscopy (EGD) with MAC [Diagnosis: iron deficiency anemia]    3. You MAY need to go for COVID testing 72 hours before procedure. The testing team will call you a few days before your procedure to discuss with you if testing is required. If you are asked to go for COVID testing and do not completed the test, the procedure cannot be performed. 4.. If you start any NEW medication after your visit today, please notify us. Certain medications will need to be held before the procedure, or the procedure cannot be performed.

## 2022-08-26 RX ORDER — LEVOTHYROXINE SODIUM 0.03 MG/1
TABLET ORAL
Qty: 90 TABLET | Refills: 0 | OUTPATIENT
Start: 2022-08-26

## 2022-08-27 RX ORDER — LANCETS
EACH MISCELLANEOUS
Qty: 100 EACH | Refills: 3 | Status: SHIPPED | OUTPATIENT
Start: 2022-08-27 | End: 2022-11-28

## 2022-08-27 NOTE — TELEPHONE ENCOUNTER
Refill passed per [employer] [facility] protocol.     Requested Prescriptions   Pending Prescriptions Disp Refills    ACCU-CHEK SOFTCLIX LANCETS Does not apply Misc [Pharmacy Med Name: Ender Padmini 100 each 3     Sig: USE ONCE DAILY        Diabetic Supplies Protocol Passed - 8/27/2022  8:07 AM        Passed - In person appointment or virtual visit in the past 12 mos or appointment in next 3 mos       Recent Outpatient Visits              4 days ago Iron deficiency anemia, unspecified iron deficiency anemia type    Lucila Malave MD    Office Visit    1 week ago Acute gout involving toe of left foot, unspecified cause    [address] [facility], Montse Newman MD    Office Visit    1 month ago Type 2 diabetes mellitus without complication, without long-term current use of insulin [facility])    [place] [employer] [place], [place], Montse Newman MD    Office Visit    2 months ago Cognitive and behavioral changes    EDMUNDO Matthews, Dhiraj Walker MD    Office Visit    3 months ago Essential hypertension with goal blood pressure less than 140/90    [place] [employer] [place], [place], Montse Newman MD    Office Visit     Future Appointments         Provider Department Appt Notes    In 1 month COLLEEN COMER [facility] GI PROCEDURE Colonoscopy & EGD @ [facility]    In 1 month Dolly Kaye MD [place] [employer] [facility], [address] [place] [address] 3 mo f/u **Encourage pt to schedule MA PX    In 3 months Dolly Kaye MD [place] [employer] [facility], [address] [place] [address] physical; Policy Informed    In 4 months Lorie Meredith MD [facility] Ophthalmology dm ee ep                  ERGOCALCIFEROL 1.25 MG (89705 UT) Oral Cap [Pharmacy Med Name: VITAMIN D2 50,000IU (ERGO) CAP RX] 3 capsule 3     Sig: TAKE 1 CAPSULE BY MOUTH EVERY 30 DAYS        There is no refill protocol information for this order           Future Appointments         Provider Department Appt Notes    In 1 month COMER, PROCEDURE [facility] [employer] GI PROCEDURE Colonoscopy & EGD @ [facility]    In 1 month MD Oleg Jameson 3 mo f/u **Encourage pt to schedule MA PX    In 3 months MD Oleg Jameson MA physical; Policy Informed    In 4 months Jeannie Daley MD [facility] Ophthalmology dm ee ep            Recent Outpatient Visits              4 days ago Iron deficiency anemia, unspecified iron deficiency anemia type    Melody Chacon MD    Office Visit    1 week ago Acute gout involving toe of left foot, unspecified cause    Severa Schmidt, [place], Elidia Spoon, MD    Office Visit    1 month ago Type 2 diabetes mellitus without complication, without long-term current use of insulin [facility])    [facility], [facility], [address] [place] [address] Rd,[address], Madeline Newman MD    Office Visit    2 months ago Cognitive and behavioral changes    Altagracia He MD    Office Visit    3 months ago Essential hypertension with goal blood pressure less than 140/90    Severa Schmidt, [place], Elidia Spoon, MD    Office Visit You can access the FollowMyHealth Patient Portal offered by [facility] by registering at the following website: [email or web address]. By joining [employer]’s FollowMyHealth portal, you will also be able to view your health information using other applications (apps) compatible with our system.

## 2022-08-27 NOTE — TELEPHONE ENCOUNTER
Please review; protocol failed.     Requested Prescriptions   Pending Prescriptions Disp Refills    ERGOCALCIFEROL 1.25 MG (66956 UT) Oral Cap [Pharmacy Med Name: VITAMIN D2 50,000IU (ERGO) CAP RX] 3 capsule 3     Sig: TAKE 1 CAPSULE BY MOUTH EVERY 30 DAYS        There is no refill protocol information for this order       Signed Prescriptions Disp Refills    Accu-Chek Softclix Lancets Does not apply Misc 100 each 3     Sig: USE ONCE DAILY        Diabetic Supplies Protocol Passed - 8/27/2022  8:07 AM        Passed - In person appointment or virtual visit in the past 12 mos or appointment in next 3 mos       Recent Outpatient Visits              4 days ago Iron deficiency anemia, unspecified iron deficiency anemia type    Jenni MD Asya    Office Visit    1 week ago Acute gout involving toe of left foot, unspecified cause    Reynaldo Vega MD    Office Visit    1 month ago Type 2 diabetes mellitus without complication, without long-term current use of insulin [facility])    [address] [place], Cleaster Miles, MD    Office Visit    2 months ago Cognitive and behavioral changes    [employer] Amara Venegas MD    Office Visit    3 months ago Essential hypertension with goal blood pressure less than 140/90    [address] Mannie Pagan, MD    Office Visit     Future Appointments         Provider Department Appt Notes    In 1 month [place] PROCEDURE [address] GI PROCEDURE Colonoscopy & EGD @ [address]    In 1 month Deon Snellen, MD [facility] 3 mo f/u **Encourage pt to schedule MA PX    In 3 months Deon Snellen, MD [address] [place] physical; Policy Informed    In 4 months Gracy Vernon MD [facility] Ophthalmology dm ee ep                     Future Appointments         Provider Department Appt Notes    In 1 month COMER, PROCEDURE [address] GI PROCEDURE Colonoscopy & EGD @ [address]    In 1 month MD Oleg Gomez 3 mo f/u **Encourage pt to schedule MA PX    In 3 months MD Oleg Gomez MA physical; Policy Informed    In 4 months Reta Tavarez MD [facility] Ophthalmology dm ee ep            Recent Outpatient Visits              4 days ago Iron deficiency anemia, unspecified iron deficiency anemia type    Oswaldo Sorto MD    Office Visit    1 week ago Acute gout involving toe of left foot, unspecified cause    [address] [facility], Mary Newman MD    Office Visit    1 month ago Type 2 diabetes mellitus without complication, without long-term current use of insulin [facility])    Rne[place] Neri, [address], Mary Newman MD    Office Visit    2 months ago Cognitive and behavioral changes    Sarah Ahumada, MD    Office Visit    3 months ago Essential hypertension with goal blood pressure less than 140/90    [address] [facility], Mary Newman MD    Office Visit

## 2022-08-28 RX ORDER — ERGOCALCIFEROL 1.25 MG/1
50000 CAPSULE ORAL
Qty: 3 CAPSULE | Refills: 3 | OUTPATIENT
Start: 2022-08-28

## 2022-09-01 ENCOUNTER — TELEPHONE (OUTPATIENT)
Dept: INTERNAL MEDICINE CLINIC | Facility: CLINIC | Age: 72
End: 2022-09-01

## 2022-09-01 DIAGNOSIS — D50.9 IRON DEFICIENCY ANEMIA, UNSPECIFIED IRON DEFICIENCY ANEMIA TYPE: Primary | ICD-10-CM

## 2022-09-01 DIAGNOSIS — R63.4 WEIGHT LOSS: ICD-10-CM

## 2022-09-01 NOTE — TELEPHONE ENCOUNTER
Patient's wife requesting recommendation for dietician for , was recommended to have  see dietician by Dr. Ev Ram (GI), but has been unable to arrange appointment. Also requesting to speak with doctor or nurse about getting patient B12 shots, was recommended by neurologist and would like to know what Dr. Martin Marcelino thinks.

## 2022-09-09 NOTE — TELEPHONE ENCOUNTER
Spoke to patient's spouse,  (on MARYCARMEN, verified patient's name and ). She is following up on the questions below. She is asking for a dietician referral and B12 injections. She is concerned about patient's memory loss and heard that B12 can help with that. She takes B12 injections and feels that is more helpful than oral B12. Dr. Martin Marcelino, please advise. Pended referral dx: Iron deficiency anemia. GI placed referral but spouse was told PCP has to order it. Patient does have colonoscopy/endoscopy scheduled for October.

## 2022-09-15 NOTE — TELEPHONE ENCOUNTER
Contact pt.  Lab order generated for full labs to be done at [address] [place] [address]. 12 hour fasting [Joint Pain] : joint pain [Negative] : Heme/Lymph

## 2022-09-15 NOTE — TELEPHONE ENCOUNTER
Referral signed for dietitian. Last B12 level was normal so I do not think he would benefit from injections. He can try over-the-counter B12 pills and see if that helps.

## 2022-09-29 ENCOUNTER — LAB ENCOUNTER (OUTPATIENT)
Dept: LAB | Facility: HOSPITAL | Age: 72
End: 2022-09-29
Attending: STUDENT IN AN ORGANIZED HEALTH CARE EDUCATION/TRAINING PROGRAM
Payer: MEDICARE

## 2022-09-29 DIAGNOSIS — D50.9 IRON DEFICIENCY ANEMIA, UNSPECIFIED IRON DEFICIENCY ANEMIA TYPE: ICD-10-CM

## 2022-09-29 LAB
DEPRECATED HBV CORE AB SER IA-ACNC: 60.1 NG/ML
IRON SATN MFR SERPL: 26 %
IRON SERPL-MCNC: 104 UG/DL
TIBC SERPL-MCNC: 396 UG/DL (ref 240–450)
TRANSFERRIN SERPL-MCNC: 266 MG/DL (ref 200–360)

## 2022-09-29 PROCEDURE — 36415 COLL VENOUS BLD VENIPUNCTURE: CPT

## 2022-09-29 PROCEDURE — 83540 ASSAY OF IRON: CPT

## 2022-09-29 PROCEDURE — 83550 IRON BINDING TEST: CPT

## 2022-09-29 PROCEDURE — 82728 ASSAY OF FERRITIN: CPT

## 2022-09-30 NOTE — TELEPHONE ENCOUNTER
Wife called in to get prep medicine for procedure please follow up and please call wife when it is sent ph # [phone] or [phone] or [phone]

## 2022-09-30 NOTE — TELEPHONE ENCOUNTER
Dr. Tashi Box,    Patient requesting prep for procedure on 10/6/22. Please review pended order and sign if appropriate, thank you!

## 2022-10-03 ENCOUNTER — LAB REQUISITION (OUTPATIENT)
Dept: SURGERY | Age: 72
End: 2022-10-03
Payer: MEDICARE

## 2022-10-03 ENCOUNTER — TELEPHONE (OUTPATIENT)
Dept: GASTROENTEROLOGY | Facility: CLINIC | Age: 72
End: 2022-10-03

## 2022-10-03 DIAGNOSIS — Z01.818 PREOP EXAMINATION: ICD-10-CM

## 2022-10-03 NOTE — TELEPHONE ENCOUNTER
I spoke to both the patient and his wife. They had numerous questions about the bowel prep, anti gas medication, clear liquid diet, medications, how to make broth (I told her they could either buy over the counter or find a recipe online as I do not know how to make it but advised if it has sediment/pieces of chicken it is not clear). I reviewed split dose prep in detail. They voiced frustration with going to the pharmacy and there not being a prep order. I apologized for this many times during the call. Over 20 minutes spent on phone answering all questions.

## 2022-10-03 NOTE — PROGRESS NOTES
Iron and iron percent saturation significantly improved after iron supplementation. Still will proceed with EGD/Colonoscopy as scheduled.

## 2022-10-04 ENCOUNTER — TELEPHONE (OUTPATIENT)
Dept: GASTROENTEROLOGY | Facility: CLINIC | Age: 72
End: 2022-10-04

## 2022-10-04 LAB — SARS-COV-2 RNA RESP QL NAA+PROBE: NOT DETECTED

## 2022-10-04 NOTE — TELEPHONE ENCOUNTER
Contacted patient and wife and again reviewed diet and prep instructions in full detail. They verbalized understanding, aware eosc will call tomorrow before 3pm with arrival/procedure time.

## 2022-10-06 ENCOUNTER — SURGERY CENTER DOCUMENTATION (OUTPATIENT)
Dept: SURGERY | Age: 72
End: 2022-10-06

## 2022-10-06 DIAGNOSIS — K21.9 GASTROESOPHAGEAL REFLUX DISEASE WITHOUT ESOPHAGITIS: Primary | ICD-10-CM

## 2022-10-06 RX ORDER — OMEPRAZOLE 40 MG/1
40 CAPSULE, DELAYED RELEASE ORAL DAILY
Qty: 30 CAPSULE | Refills: 2 | Status: SHIPPED | OUTPATIENT
Start: 2022-10-06 | End: 2023-01-04

## 2022-10-12 ENCOUNTER — TELEPHONE (OUTPATIENT)
Dept: GASTROENTEROLOGY | Facility: CLINIC | Age: 72
End: 2022-10-12

## 2022-10-13 ENCOUNTER — TELEPHONE (OUTPATIENT)
Dept: GASTROENTEROLOGY | Facility: CLINIC | Age: 72
End: 2022-10-13

## 2022-10-13 NOTE — TELEPHONE ENCOUNTER
I reviewed the patient's pathology results. Biopsies from the esophagus, stomach, duodenum, terminal ileum, colon were all unremarkable which is reassuring. There is no source of iron deficiency anemia seen on our exam today. The patient's iron levels have significantly improved since being started on oral iron and are essentially normal now. The patient is currently asymptomatic and denies any issues with oral intake, nausea, vomiting. He denies dysphagia at this time and is having regular and well formed bowel movements. He can follow-up in clinic as needed but at this time there is no additional work-up needed from GI perspective. I will send a [employer] message to the patient as well.     Valeria Sheth MD

## 2022-10-24 ENCOUNTER — OFFICE VISIT (OUTPATIENT)
Facility: CLINIC | Age: 72
End: 2022-10-24
Payer: COMMERCIAL

## 2022-10-24 VITALS
TEMPERATURE: 97 F | BODY MASS INDEX: 19.46 KG/M2 | SYSTOLIC BLOOD PRESSURE: 124 MMHG | DIASTOLIC BLOOD PRESSURE: 62 MMHG | HEIGHT: 71 IN | HEART RATE: 76 BPM | WEIGHT: 139 LBS | RESPIRATION RATE: 18 BRPM

## 2022-10-24 DIAGNOSIS — G25.81 RESTLESS LEGS: ICD-10-CM

## 2022-10-24 DIAGNOSIS — R41.89 COGNITIVE IMPAIRMENT: ICD-10-CM

## 2022-10-24 DIAGNOSIS — E11.9 TYPE 2 DIABETES MELLITUS WITHOUT COMPLICATION, WITHOUT LONG-TERM CURRENT USE OF INSULIN (HCC): ICD-10-CM

## 2022-10-24 DIAGNOSIS — E03.9 HYPOTHYROIDISM, UNSPECIFIED TYPE: ICD-10-CM

## 2022-10-24 DIAGNOSIS — I10 ESSENTIAL HYPERTENSION WITH GOAL BLOOD PRESSURE LESS THAN 140/90: ICD-10-CM

## 2022-10-24 DIAGNOSIS — R63.6 LOW WEIGHT: ICD-10-CM

## 2022-10-24 DIAGNOSIS — Z23 NEED FOR VACCINATION: ICD-10-CM

## 2022-10-24 DIAGNOSIS — E61.1 IRON DEFICIENCY: Primary | ICD-10-CM

## 2022-10-24 PROCEDURE — 99214 OFFICE O/P EST MOD 30 MIN: CPT | Performed by: INTERNAL MEDICINE

## 2022-10-24 PROCEDURE — 1126F AMNT PAIN NOTED NONE PRSNT: CPT | Performed by: INTERNAL MEDICINE

## 2022-10-24 PROCEDURE — 3074F SYST BP LT 130 MM HG: CPT | Performed by: INTERNAL MEDICINE

## 2022-10-24 PROCEDURE — 3008F BODY MASS INDEX DOCD: CPT | Performed by: INTERNAL MEDICINE

## 2022-10-24 PROCEDURE — 90662 IIV NO PRSV INCREASED AG IM: CPT | Performed by: INTERNAL MEDICINE

## 2022-10-24 PROCEDURE — 3078F DIAST BP <80 MM HG: CPT | Performed by: INTERNAL MEDICINE

## 2022-10-24 PROCEDURE — G0008 ADMIN INFLUENZA VIRUS VAC: HCPCS | Performed by: INTERNAL MEDICINE

## 2022-10-24 RX ORDER — LEVOTHYROXINE SODIUM 0.05 MG/1
TABLET ORAL
COMMUNITY
Start: 2022-08-17 | End: 2022-10-24

## 2022-10-24 RX ORDER — POLYETHYLENE GLYCOL 3350, SODIUM SULFATE ANHYDROUS, SODIUM BICARBONATE, SODIUM CHLORIDE, POTASSIUM CHLORIDE 236; 22.74; 6.74; 5.86; 2.97 G/4L; G/4L; G/4L; G/4L; G/4L
POWDER, FOR SOLUTION ORAL
COMMUNITY
Start: 2022-10-01 | End: 2022-10-24 | Stop reason: ALTCHOICE

## 2022-10-24 RX ORDER — LISINOPRIL 10 MG/1
TABLET ORAL
COMMUNITY
Start: 2022-10-22 | End: 2022-10-24 | Stop reason: DRUGHIGH

## 2022-10-26 ENCOUNTER — ORDER TRANSCRIPTION (OUTPATIENT)
Dept: ADMINISTRATIVE | Facility: HOSPITAL | Age: 72
End: 2022-10-26

## 2022-10-26 DIAGNOSIS — E11.9 TYPE 2 DIABETES MELLITUS WITHOUT COMPLICATION, WITHOUT LONG-TERM CURRENT USE OF INSULIN (HCC): Primary | ICD-10-CM

## 2022-10-26 DIAGNOSIS — R63.6 LOW WEIGHT: ICD-10-CM

## 2022-10-26 DIAGNOSIS — E61.1 IRON DEFICIENCY: ICD-10-CM

## 2022-10-27 ENCOUNTER — DIABETIC EDUCATION (OUTPATIENT)
Dept: ENDOCRINOLOGY CLINIC | Facility: CLINIC | Age: 72
End: 2022-10-27
Payer: COMMERCIAL

## 2022-10-27 DIAGNOSIS — E11.65 TYPE 2 DIABETES MELLITUS WITH HYPERGLYCEMIA, WITHOUT LONG-TERM CURRENT USE OF INSULIN (HCC): Primary | ICD-10-CM

## 2022-10-27 PROCEDURE — 97802 MEDICAL NUTRITION INDIV IN: CPT | Performed by: DIETITIAN, REGISTERED

## 2022-10-27 NOTE — PROGRESS NOTES
Elena Moralez   11/28/1950 was seen for Medical Nutrition Therapy with Diabetes:    10/27/2022  Referring Provider: Dr. Roberth Mcneal  Start time: 2:00 End time: 3:00    HEMOGLOBIN A1C (%)   Date Value   08/15/2022 5.6     They are here to review B12 and Iron content of foods (many of the foods that are high in B12 and also high in Iron, fortunately). She is concerned about his memory. He had reduced weight 30-40# from 2020 to 2021 but is now stable at ~140#. Also gave him a list of high calorie, healthy foods to help maintain weight: nuts and seeds, nut butters, avocado and guacamole. B (11am): milk and cereal - honey nut cheerios and a banana  Sn: 1pm can of Ensure  L (2pm): rice or whole wheat bread (3 slices) with palma (meat and vegetables) or egg sandwich  D (6:30/7:30): see lunch  HS: one date and some fruit: apple, oranges, kiwi, grapes. Sometimes with nuts. Exercise: walks in the evening. In the winter he walks in the house (15 minutes each time)     Reviewed HgbA1C and target A1c levels. Reviewed target BG levels FBS  and pp <150 ideally. Reviewed patient's fasting values: every other day: 100    Reviewed current diabetes medications: metformin 500 mg BID, glipizide ER 5 mg daily    Discussed macronutrient balance: reduce starch, include lean protein and non-starchy vegetables, also fruit, yogurt and milk using food models. Reviewed principles for heart healthy diet: reduced saturated fat, reduced sodium and high fiber. Exercise: Discussed benefits of regular physical activity and goal to complete 30 minutes daily. Patient set diabetes self-management goals: increase B12 and Iron intake. Continue good BG control. Control exercise and weight maintenance/avoid weight loss. Patient is willing to make lifestyle changes to improve blood glucose control. Written materials provided for all topics covered.   Patient verbalized understanding and has no further questions at this time. Thank you for the referral.  Follow-up plan: pcp  Patient to contact diabetes center for questions/concerns.   Kelle Calvillo MS, RD, JONATHAN, PHILOMENAN

## 2022-11-11 ENCOUNTER — IMMUNIZATION (OUTPATIENT)
Dept: LAB | Age: 72
End: 2022-11-11
Attending: EMERGENCY MEDICINE
Payer: MEDICARE

## 2022-11-11 DIAGNOSIS — Z23 NEED FOR VACCINATION: Primary | ICD-10-CM

## 2022-11-11 PROCEDURE — 0124A SARSCOV2 VAC BVL 30MCG/0.3ML: CPT

## 2022-11-13 RX ORDER — LEVOTHYROXINE SODIUM 0.03 MG/1
25 TABLET ORAL
Qty: 90 TABLET | Refills: 1 | Status: SHIPPED | OUTPATIENT
Start: 2022-11-13

## 2022-11-13 NOTE — TELEPHONE ENCOUNTER
Please review. Protocol failed/ No protocol.     Requested Prescriptions   Pending Prescriptions Disp Refills    levothyroxine 25 MCG Oral Tab 90 tablet 0     Sig: Take 1 tablet (25 mcg total) by mouth before breakfast.       Thyroid Medication Protocol Failed - 11/12/2022  8:05 PM        Failed - Last TSH value is normal     Lab Results   Component Value Date    TSH <0.005 (L) 08/15/2022    TSHT4 5.56 (H) 10/14/2020                 Passed - TSH in past 12 months        Passed - In person appointment or virtual visit in the past 12 mos or appointment in next 3 mos     Recent Outpatient Visits              2 weeks ago Iron deficiency    Nevaeh Hillman MD    Office Visit    2 months ago Iron deficiency anemia, unspecified iron deficiency anemia type    Lucius Chapman MD    Office Visit    3 months ago Acute gout involving toe of left foot, unspecified cause    [facility], Deja Newman MD    Office Visit    4 months ago Type 2 diabetes mellitus without complication, without long-term current use of insulin [facility])    [address], [address] [place] [address],[address], Deja Newman MD    Office Visit    4 months ago Cognitive and behavioral changes    [address] Tati Estrada MD    Office Visit          Future Appointments         Provider Department Appt Notes    In 2 weeks Xiomara Au MD [facility], [facility] physical; Policy Informed    In 1 month Julianna Collado MD [facility] Ophthalmology saur[address] osng ep

## 2022-11-18 RX ORDER — LISINOPRIL 5 MG/1
TABLET ORAL
Qty: 90 TABLET | Refills: 1 | Status: SHIPPED | OUTPATIENT
Start: 2022-11-18

## 2022-11-22 ENCOUNTER — LAB ENCOUNTER (OUTPATIENT)
Dept: LAB | Age: 72
End: 2022-11-22
Attending: INTERNAL MEDICINE
Payer: MEDICARE

## 2022-11-22 DIAGNOSIS — E11.9 TYPE 2 DIABETES MELLITUS WITHOUT COMPLICATION, WITHOUT LONG-TERM CURRENT USE OF INSULIN (HCC): ICD-10-CM

## 2022-11-22 DIAGNOSIS — E03.9 HYPOTHYROIDISM, UNSPECIFIED TYPE: ICD-10-CM

## 2022-11-22 DIAGNOSIS — R20.8 DECREASED VIBRATORY SENSE: ICD-10-CM

## 2022-11-22 LAB
ALBUMIN SERPL-MCNC: 4.3 G/DL (ref 3.4–5)
ALBUMIN/GLOB SERPL: 1.4 {RATIO} (ref 1–2)
ALP LIVER SERPL-CCNC: 72 U/L
ALT SERPL-CCNC: 25 U/L
ANION GAP SERPL CALC-SCNC: 7 MMOL/L (ref 0–18)
AST SERPL-CCNC: 16 U/L (ref 15–37)
BILIRUB SERPL-MCNC: 0.6 MG/DL (ref 0.1–2)
BUN BLD-MCNC: 26 MG/DL (ref 7–18)
BUN/CREAT SERPL: 26 (ref 10–20)
CALCIUM BLD-MCNC: 9.1 MG/DL (ref 8.5–10.1)
CHLORIDE SERPL-SCNC: 104 MMOL/L (ref 98–112)
CHOLEST SERPL-MCNC: 169 MG/DL (ref ?–200)
CO2 SERPL-SCNC: 28 MMOL/L (ref 21–32)
CREAT BLD-MCNC: 1 MG/DL
EST. AVERAGE GLUCOSE BLD GHB EST-MCNC: 117 MG/DL (ref 68–126)
FASTING PATIENT LIPID ANSWER: YES
FASTING STATUS PATIENT QL REPORTED: YES
GFR SERPLBLD BASED ON 1.73 SQ M-ARVRAT: 80 ML/MIN/1.73M2 (ref 60–?)
GLOBULIN PLAS-MCNC: 3.1 G/DL (ref 2.8–4.4)
GLUCOSE BLD-MCNC: 112 MG/DL (ref 70–99)
HBA1C MFR BLD: 5.7 % (ref ?–5.7)
HDLC SERPL-MCNC: 68 MG/DL (ref 40–59)
LDLC SERPL CALC-MCNC: 87 MG/DL (ref ?–100)
NONHDLC SERPL-MCNC: 101 MG/DL (ref ?–130)
OSMOLALITY SERPL CALC.SUM OF ELEC: 294 MOSM/KG (ref 275–295)
POTASSIUM SERPL-SCNC: 4.1 MMOL/L (ref 3.5–5.1)
PROT SERPL-MCNC: 7.4 G/DL (ref 6.4–8.2)
SODIUM SERPL-SCNC: 139 MMOL/L (ref 136–145)
TRIGL SERPL-MCNC: 74 MG/DL (ref 30–149)
TSI SER-ACNC: 5.49 MIU/ML (ref 0.36–3.74)
VIT B12 SERPL-MCNC: 1035 PG/ML (ref 193–986)
VLDLC SERPL CALC-MCNC: 12 MG/DL (ref 0–30)

## 2022-11-22 PROCEDURE — 80061 LIPID PANEL: CPT

## 2022-11-22 PROCEDURE — 82607 VITAMIN B-12: CPT

## 2022-11-22 PROCEDURE — 84443 ASSAY THYROID STIM HORMONE: CPT

## 2022-11-22 PROCEDURE — 83036 HEMOGLOBIN GLYCOSYLATED A1C: CPT

## 2022-11-22 PROCEDURE — 80053 COMPREHEN METABOLIC PANEL: CPT

## 2022-11-28 ENCOUNTER — OFFICE VISIT (OUTPATIENT)
Facility: CLINIC | Age: 72
End: 2022-11-28
Payer: COMMERCIAL

## 2022-11-28 VITALS
HEIGHT: 71 IN | TEMPERATURE: 98 F | HEART RATE: 86 BPM | WEIGHT: 142 LBS | BODY MASS INDEX: 19.88 KG/M2 | SYSTOLIC BLOOD PRESSURE: 112 MMHG | RESPIRATION RATE: 18 BRPM | DIASTOLIC BLOOD PRESSURE: 68 MMHG

## 2022-11-28 DIAGNOSIS — E11.9 TYPE 2 DIABETES MELLITUS WITHOUT COMPLICATION, WITHOUT LONG-TERM CURRENT USE OF INSULIN (HCC): ICD-10-CM

## 2022-11-28 DIAGNOSIS — D50.9 IRON DEFICIENCY ANEMIA, UNSPECIFIED IRON DEFICIENCY ANEMIA TYPE: ICD-10-CM

## 2022-11-28 DIAGNOSIS — R41.89 COGNITIVE IMPAIRMENT: ICD-10-CM

## 2022-11-28 DIAGNOSIS — E55.9 VITAMIN D DEFICIENCY: ICD-10-CM

## 2022-11-28 DIAGNOSIS — I10 ESSENTIAL HYPERTENSION WITH GOAL BLOOD PRESSURE LESS THAN 140/90: ICD-10-CM

## 2022-11-28 DIAGNOSIS — E61.1 IRON DEFICIENCY: ICD-10-CM

## 2022-11-28 DIAGNOSIS — E03.9 HYPOTHYROIDISM, UNSPECIFIED TYPE: ICD-10-CM

## 2022-11-28 DIAGNOSIS — Z00.00 ENCOUNTER FOR ANNUAL HEALTH EXAMINATION: Primary | ICD-10-CM

## 2022-11-28 DIAGNOSIS — G25.81 RESTLESS LEGS: ICD-10-CM

## 2022-11-28 PROBLEM — E46 PROTEIN-CALORIE MALNUTRITION, UNSPECIFIED SEVERITY (HCC): Status: ACTIVE | Noted: 2022-11-28

## 2022-11-28 RX ORDER — GLIPIZIDE 5 MG/1
5 TABLET, FILM COATED, EXTENDED RELEASE ORAL DAILY
Qty: 90 TABLET | Refills: 3 | Status: SHIPPED | OUTPATIENT
Start: 2022-11-28

## 2022-11-28 RX ORDER — LANCETS
EACH MISCELLANEOUS
Qty: 100 EACH | Refills: 3 | Status: SHIPPED | OUTPATIENT
Start: 2022-11-28

## 2022-11-28 RX ORDER — MIRTAZAPINE 15 MG/1
15 TABLET, FILM COATED ORAL NIGHTLY
Qty: 90 TABLET | Refills: 1 | Status: SHIPPED | OUTPATIENT
Start: 2022-11-28

## 2022-11-28 RX ORDER — LEVOTHYROXINE SODIUM 0.03 MG/1
25 TABLET ORAL
Qty: 90 TABLET | Refills: 1 | Status: SHIPPED | OUTPATIENT
Start: 2022-11-28

## 2022-11-28 RX ORDER — LISINOPRIL 5 MG/1
5 TABLET ORAL DAILY
Qty: 90 TABLET | Refills: 1 | Status: SHIPPED | OUTPATIENT
Start: 2022-11-28

## 2022-11-28 RX ORDER — ERGOCALCIFEROL 1.25 MG/1
50000 CAPSULE ORAL
Qty: 3 CAPSULE | Refills: 3 | Status: SHIPPED | OUTPATIENT
Start: 2022-11-28

## 2022-11-28 RX ORDER — BLOOD SUGAR DIAGNOSTIC
STRIP MISCELLANEOUS
Qty: 100 STRIP | Refills: 5 | Status: SHIPPED | OUTPATIENT
Start: 2022-11-28

## 2022-12-29 ENCOUNTER — TELEPHONE (OUTPATIENT)
Dept: INTERNAL MEDICINE CLINIC | Facility: CLINIC | Age: 72
End: 2022-12-29

## 2022-12-29 DIAGNOSIS — E11.9 DIABETIC EYE EXAM (HCC): Primary | ICD-10-CM

## 2022-12-29 DIAGNOSIS — Z01.00 DIABETIC EYE EXAM (HCC): Primary | ICD-10-CM

## 2022-12-29 NOTE — TELEPHONE ENCOUNTER
Spoke to patient's wife, ok per MARYCARMEN (name and  of patient verified). She states patient has appointment with ophthalmologist Dr. Santo Vance 23, but their insurance is changing to an [facility] 2023 and they need a referral.    New insurance information:   [facility] ([id number]) [id number]  Niyah Custard: [id number]  RXPCN: [id number]  [place] Medicare RX Prescription Drug Coverage: [id number]    Manage Care, referral pended for review (unsure how to include new insurance information). Wife requesting call with questions/updates on cell phone [phone]. Thank you.

## 2022-12-29 NOTE — TELEPHONE ENCOUNTER
Hello     Please have the  update health plan and generate a referral.     Thank you,  [facility]  Referral specialist

## 2023-01-03 NOTE — TELEPHONE ENCOUNTER
Patient's wife calling, confirmed patient's name and . She is asking if the referral for her 's / diabetic eye exam has been signed yet. PSR: please update insurance using information below.      Routing to providers in office to sign referral.

## 2023-01-05 ENCOUNTER — OFFICE VISIT (OUTPATIENT)
Dept: OPHTHALMOLOGY | Facility: CLINIC | Age: 73
End: 2023-01-05
Payer: MEDICARE

## 2023-01-05 DIAGNOSIS — H43.391 FLOATERS, RIGHT: ICD-10-CM

## 2023-01-05 DIAGNOSIS — E11.9 TYPE 2 DIABETES MELLITUS WITHOUT RETINOPATHY (HCC): Primary | ICD-10-CM

## 2023-01-05 DIAGNOSIS — H25.13 AGE-RELATED NUCLEAR CATARACT OF BOTH EYES: ICD-10-CM

## 2023-01-05 PROCEDURE — 92014 COMPRE OPH EXAM EST PT 1/>: CPT | Performed by: OPHTHALMOLOGY

## 2023-01-05 PROCEDURE — 1126F AMNT PAIN NOTED NONE PRSNT: CPT | Performed by: OPHTHALMOLOGY

## 2023-01-05 PROCEDURE — 2023F DILAT RTA XM W/O RTNOPTHY: CPT | Performed by: OPHTHALMOLOGY

## 2023-01-05 PROCEDURE — 92015 DETERMINE REFRACTIVE STATE: CPT | Performed by: OPHTHALMOLOGY

## 2023-01-05 NOTE — ASSESSMENT & PLAN NOTE
Discussed early cataracts with patient. Told patient that cataracts are age appropriate and they are not surgical at this time. No treatment recommended at this time.      Glasses Rx given or try +2.00 OTC reading glasses

## 2023-01-05 NOTE — PATIENT INSTRUCTIONS
Age-related nuclear cataract of both eyes  Discussed early cataracts with patient. Told patient that cataracts are age appropriate and they are not surgical at this time. No treatment recommended at this time. Glasses Rx given or try +2.00 OTC reading glasses    Type 2 diabetes mellitus without retinopathy ([address]  Diabetes type II: no background of retinopathy, no signs of neovascularization noted. Discussed ocular and systemic benefits of blood sugar control. Diagnosis and treatment discussed in detail with patient. Floaters, right   There is no evidence of retinal pathology. All signs and symptoms of retinal detachment/tears explained in detail. Patient instructed to call the office if they experience increase in floaters, increase in flashes of light, loss of vision or curtain or veil effect.

## 2023-03-03 ENCOUNTER — LAB ENCOUNTER (OUTPATIENT)
Dept: LAB | Age: 73
End: 2023-03-03
Attending: INTERNAL MEDICINE
Payer: MEDICARE

## 2023-03-03 DIAGNOSIS — G25.81 RESTLESS LEGS: ICD-10-CM

## 2023-03-03 DIAGNOSIS — E11.9 TYPE 2 DIABETES MELLITUS WITHOUT COMPLICATION, WITHOUT LONG-TERM CURRENT USE OF INSULIN (HCC): ICD-10-CM

## 2023-03-03 LAB
BASOPHILS # BLD AUTO: 0.03 X10(3) UL (ref 0–0.2)
BASOPHILS NFR BLD AUTO: 0.4 %
EOSINOPHIL # BLD AUTO: 0.23 X10(3) UL (ref 0–0.7)
EOSINOPHIL NFR BLD AUTO: 3 %
ERYTHROCYTE [DISTWIDTH] IN BLOOD BY AUTOMATED COUNT: 13.6 %
EST. AVERAGE GLUCOSE BLD GHB EST-MCNC: 137 MG/DL (ref 68–126)
HBA1C MFR BLD: 6.4 % (ref ?–5.7)
HCT VFR BLD AUTO: 41 %
HGB BLD-MCNC: 13.5 G/DL
IMM GRANULOCYTES # BLD AUTO: 0.02 X10(3) UL (ref 0–1)
IMM GRANULOCYTES NFR BLD: 0.3 %
IRON SATN MFR SERPL: 26 %
IRON SERPL-MCNC: 102 UG/DL
LYMPHOCYTES # BLD AUTO: 2.93 X10(3) UL (ref 1–4)
LYMPHOCYTES NFR BLD AUTO: 38.4 %
MCH RBC QN AUTO: 31.6 PG (ref 26–34)
MCHC RBC AUTO-ENTMCNC: 32.9 G/DL (ref 31–37)
MCV RBC AUTO: 96 FL
MONOCYTES # BLD AUTO: 0.59 X10(3) UL (ref 0.1–1)
MONOCYTES NFR BLD AUTO: 7.7 %
NEUTROPHILS # BLD AUTO: 3.84 X10 (3) UL (ref 1.5–7.7)
NEUTROPHILS # BLD AUTO: 3.84 X10(3) UL (ref 1.5–7.7)
NEUTROPHILS NFR BLD AUTO: 50.2 %
PLATELET # BLD AUTO: 218 10(3)UL (ref 150–450)
RBC # BLD AUTO: 4.27 X10(6)UL
TIBC SERPL-MCNC: 395 UG/DL (ref 240–450)
TRANSFERRIN SERPL-MCNC: 265 MG/DL (ref 200–360)
VIT B12 SERPL-MCNC: 720 PG/ML (ref 193–986)
WBC # BLD AUTO: 7.6 X10(3) UL (ref 4–11)

## 2023-03-03 PROCEDURE — 3044F HG A1C LEVEL LT 7.0%: CPT | Performed by: INTERNAL MEDICINE

## 2023-03-03 PROCEDURE — 83550 IRON BINDING TEST: CPT

## 2023-03-03 PROCEDURE — 83540 ASSAY OF IRON: CPT

## 2023-03-03 PROCEDURE — 83036 HEMOGLOBIN GLYCOSYLATED A1C: CPT

## 2023-03-03 PROCEDURE — 82607 VITAMIN B-12: CPT

## 2023-03-03 PROCEDURE — 85025 COMPLETE CBC W/AUTO DIFF WBC: CPT

## 2023-03-06 ENCOUNTER — OFFICE VISIT (OUTPATIENT)
Facility: CLINIC | Age: 73
End: 2023-03-06

## 2023-03-06 VITALS
SYSTOLIC BLOOD PRESSURE: 108 MMHG | BODY MASS INDEX: 20.44 KG/M2 | HEIGHT: 71 IN | DIASTOLIC BLOOD PRESSURE: 66 MMHG | RESPIRATION RATE: 18 BRPM | HEART RATE: 92 BPM | WEIGHT: 146 LBS | TEMPERATURE: 98 F

## 2023-03-06 DIAGNOSIS — I10 ESSENTIAL HYPERTENSION WITH GOAL BLOOD PRESSURE LESS THAN 140/90: ICD-10-CM

## 2023-03-06 DIAGNOSIS — E11.9 TYPE 2 DIABETES MELLITUS WITHOUT COMPLICATION, WITHOUT LONG-TERM CURRENT USE OF INSULIN (HCC): Primary | ICD-10-CM

## 2023-03-06 DIAGNOSIS — Z86.59 HISTORY OF DEPRESSION: ICD-10-CM

## 2023-03-06 DIAGNOSIS — R41.89 COGNITIVE IMPAIRMENT: ICD-10-CM

## 2023-03-06 DIAGNOSIS — R41.3 MEMORY LOSS: ICD-10-CM

## 2023-03-06 DIAGNOSIS — E61.1 IRON DEFICIENCY: ICD-10-CM

## 2023-03-06 DIAGNOSIS — E03.9 HYPOTHYROIDISM, UNSPECIFIED TYPE: ICD-10-CM

## 2023-03-06 DIAGNOSIS — G25.81 RESTLESS LEGS: ICD-10-CM

## 2023-03-06 PROBLEM — E46 PROTEIN-CALORIE MALNUTRITION, UNSPECIFIED SEVERITY (HCC): Status: RESOLVED | Noted: 2022-11-28 | Resolved: 2023-03-06

## 2023-03-06 PROCEDURE — 3008F BODY MASS INDEX DOCD: CPT | Performed by: INTERNAL MEDICINE

## 2023-03-06 PROCEDURE — 3074F SYST BP LT 130 MM HG: CPT | Performed by: INTERNAL MEDICINE

## 2023-03-06 PROCEDURE — 1126F AMNT PAIN NOTED NONE PRSNT: CPT | Performed by: INTERNAL MEDICINE

## 2023-03-06 PROCEDURE — 99214 OFFICE O/P EST MOD 30 MIN: CPT | Performed by: INTERNAL MEDICINE

## 2023-03-06 PROCEDURE — 3078F DIAST BP <80 MM HG: CPT | Performed by: INTERNAL MEDICINE

## 2023-03-17 ENCOUNTER — TELEPHONE (OUTPATIENT)
Dept: INTERNAL MEDICINE CLINIC | Facility: CLINIC | Age: 73
End: 2023-03-17

## 2023-03-21 NOTE — TELEPHONE ENCOUNTER
Please call the patient or wife. Patient takes both glipizide and metformin. Advised that during the fasting month of  Ramadan, stop the glipizide altogether. Continue the metformin. If the sugars start running too high, they should contact us. At that time we may consider cutting the dose down to 2.5 instead of 5 mg.

## 2023-03-21 NOTE — TELEPHONE ENCOUNTER
RN called wife, on speaker phone with patient. Informed Dr Keren Landau note below and stated understanding.

## 2023-04-24 ENCOUNTER — TELEPHONE (OUTPATIENT)
Dept: INTERNAL MEDICINE CLINIC | Facility: CLINIC | Age: 73
End: 2023-04-24

## 2023-04-24 NOTE — TELEPHONE ENCOUNTER
[address] [facility] is calling to advise if patient is a diabetic, this patient should be on a statin drug.

## 2023-07-10 ENCOUNTER — OFFICE VISIT (OUTPATIENT)
Facility: CLINIC | Age: 73
End: 2023-07-10

## 2023-07-10 VITALS
DIASTOLIC BLOOD PRESSURE: 60 MMHG | TEMPERATURE: 98 F | RESPIRATION RATE: 18 BRPM | BODY MASS INDEX: 20.3 KG/M2 | WEIGHT: 145 LBS | SYSTOLIC BLOOD PRESSURE: 100 MMHG | HEIGHT: 71 IN | HEART RATE: 82 BPM

## 2023-07-10 DIAGNOSIS — I10 ESSENTIAL HYPERTENSION WITH GOAL BLOOD PRESSURE LESS THAN 140/90: ICD-10-CM

## 2023-07-10 DIAGNOSIS — G25.81 RESTLESS LEGS: ICD-10-CM

## 2023-07-10 DIAGNOSIS — H43.391 FLOATERS, RIGHT: ICD-10-CM

## 2023-07-10 DIAGNOSIS — R41.89 COGNITIVE IMPAIRMENT: ICD-10-CM

## 2023-07-10 DIAGNOSIS — E03.9 HYPOTHYROIDISM, UNSPECIFIED TYPE: ICD-10-CM

## 2023-07-10 DIAGNOSIS — G31.9 BRAIN ATROPHY (HCC): ICD-10-CM

## 2023-07-10 DIAGNOSIS — E11.9 TYPE 2 DIABETES MELLITUS WITHOUT COMPLICATION, WITHOUT LONG-TERM CURRENT USE OF INSULIN (HCC): ICD-10-CM

## 2023-07-10 DIAGNOSIS — H52.4 PRESBYOPIA: ICD-10-CM

## 2023-07-10 DIAGNOSIS — Z00.00 ENCOUNTER FOR ANNUAL HEALTH EXAMINATION: Primary | ICD-10-CM

## 2023-07-10 DIAGNOSIS — H25.13 AGE-RELATED NUCLEAR CATARACT OF BOTH EYES: ICD-10-CM

## 2023-07-10 LAB
CARTRIDGE LOT#: 77 NUMERIC
HEMOGLOBIN A1C: 6.6 % (ref 4.3–5.6)

## 2023-07-10 RX ORDER — LISINOPRIL 5 MG/1
5 TABLET ORAL DAILY
Qty: 90 TABLET | Refills: 3 | Status: SHIPPED | OUTPATIENT
Start: 2023-07-10

## 2023-07-10 RX ORDER — MIRTAZAPINE 15 MG/1
15 TABLET, FILM COATED ORAL NIGHTLY
Qty: 90 TABLET | Refills: 3 | Status: SHIPPED | OUTPATIENT
Start: 2023-07-10

## 2023-07-10 RX ORDER — LEVOTHYROXINE SODIUM 0.03 MG/1
25 TABLET ORAL
Qty: 90 TABLET | Refills: 3 | Status: SHIPPED | OUTPATIENT
Start: 2023-07-10

## 2023-07-10 RX ORDER — ERGOCALCIFEROL 1.25 MG/1
50000 CAPSULE ORAL
Qty: 3 CAPSULE | Refills: 3 | Status: SHIPPED | OUTPATIENT
Start: 2023-07-10

## 2023-07-10 RX ORDER — LISINOPRIL 5 MG/1
5 TABLET ORAL DAILY
Qty: 90 TABLET | Refills: 1 | Status: CANCELLED | OUTPATIENT
Start: 2023-07-10

## 2023-07-10 RX ORDER — LEVOTHYROXINE SODIUM 0.03 MG/1
25 TABLET ORAL
Qty: 90 TABLET | Refills: 1 | Status: CANCELLED | OUTPATIENT
Start: 2023-07-10

## 2023-07-10 RX ORDER — ERGOCALCIFEROL 1.25 MG/1
50000 CAPSULE ORAL
Qty: 3 CAPSULE | Refills: 3 | Status: CANCELLED | OUTPATIENT
Start: 2023-07-10

## 2023-07-10 RX ORDER — GLIPIZIDE 5 MG/1
5 TABLET, FILM COATED, EXTENDED RELEASE ORAL DAILY
Qty: 90 TABLET | Refills: 3 | Status: CANCELLED | OUTPATIENT
Start: 2023-07-10

## 2023-07-10 RX ORDER — GLIPIZIDE 5 MG/1
5 TABLET, FILM COATED, EXTENDED RELEASE ORAL DAILY
Qty: 90 TABLET | Refills: 3 | Status: SHIPPED | OUTPATIENT
Start: 2023-07-10

## 2023-07-15 PROBLEM — H61.21 CERUMEN DEBRIS ON TYMPANIC MEMBRANE OF RIGHT EAR: Status: RESOLVED | Noted: 2020-09-28 | Resolved: 2023-07-15

## 2023-08-09 ENCOUNTER — TELEPHONE (OUTPATIENT)
Dept: INTERNAL MEDICINE CLINIC | Facility: CLINIC | Age: 73
End: 2023-08-09

## 2023-08-09 RX ORDER — INDOMETHACIN 50 MG/1
50 CAPSULE ORAL
Qty: 30 CAPSULE | Refills: 1 | Status: SHIPPED | OUTPATIENT
Start: 2023-08-09

## 2023-08-09 NOTE — TELEPHONE ENCOUNTER
Patient and spouse Hasmukh Lewis calling -on MARYCARMEN (patient name and  verified) states that patient's gout has returned. Having left foot pain and the big toe is red and slightly swollen. Exact same symptoms as 8/15/22. Having difficulty walking due to the pain. Symptoms started 2 days ago. States that the indomethacin that was previously prescribed was very effective. Asking if they can have a refill. Please advise.

## 2023-08-10 NOTE — TELEPHONE ENCOUNTER
Spoke with patient (name and  verified). Dr. Jessica Ram recommendations discussed. Patient verbalized understanding and agrees with plan.

## 2023-09-16 DIAGNOSIS — E11.9 TYPE 2 DIABETES MELLITUS WITHOUT COMPLICATION, WITHOUT LONG-TERM CURRENT USE OF INSULIN (HCC): Primary | ICD-10-CM

## 2023-09-16 NOTE — TELEPHONE ENCOUNTER
New prescription request from [facility] for:    BD Single use swab    True metrix level 1 ctrl soln

## 2023-09-18 RX ORDER — ISOPROPYL ALCOHOL 0.75 G/1
SWAB TOPICAL
Qty: 100 EACH | Refills: 3 | Status: SHIPPED | OUTPATIENT
Start: 2023-09-18 | End: 2023-10-16

## 2023-09-18 RX ORDER — BLOOD-GLUCOSE CONTROL, LOW
1 EACH MISCELLANEOUS DAILY
Qty: 1 EACH | Refills: 3 | Status: SHIPPED | OUTPATIENT
Start: 2023-09-18 | End: 2023-10-18

## 2023-09-18 NOTE — TELEPHONE ENCOUNTER
Refill passed per [employer] protocol. Requested Prescriptions   Pending Prescriptions Disp Refills    Blood Glucose Calibration (TRUE METRIX LEVEL 1) Low In Vitro Solution 1 each 3     Si each by In Vitro route daily. Diabetic Supplies Protocol Passed - 2023  7:21 AM        Passed - In person appointment or virtual visit in the past 12 mos or appointment in next 3 mos     Recent Outpatient Visits              2 months ago Encounter for annual health examination    Sharda Flynn MD    Office Visit    6 months ago Type 2 diabetes mellitus without complication, without long-term current use of insulin [facility])    Sharda Flynn MD    Office Visit    8 months ago Type 2 diabetes mellitus without retinopathy [facility])    [place], [address] [facility],[address], [place] Lawrence Lord MD    Office Visit    9 months ago Encounter for annual health examination    Trent Worthy Bubba Hila, MD    Office Visit    10 months ago Iron deficiency    Narciso Worthy's MD lizette    Office Visit          Future Appointments         Provider Department Appt Notes    In 4 weeks Cardell Essex, MD [facility], [facility] Return in about 3 months (around 10/10/2023).     In 3 months Lawrence Lord MD [facility], [address] [facility] EP/ DM EE                      Alcohol Swabs (BD SWAB SINGLE USE REGULAR) Does not apply Pads 100 each 3     Sig: Use a directed once daily       There is no refill protocol information for this order           Recent Outpatient Visits              2 months ago Encounter for annual health examination    Trent Worthy Bubba Hila, MD    Office Visit    6 months ago Type 2 diabetes mellitus without complication, without long-term current use of insulin [facility])    Rocky Coleman MD    Office Visit    8 months ago Type 2 diabetes mellitus without retinopathy [facility])    [employer], [address] [facility],[address], [place], Rohan Angulo MD    Office Visit    9 months ago Encounter for annual health examination    Trent Gonsales Rod Neighbors, MD    Office Visit    10 months ago Iron deficiency    Narciso Gonsales's MD lizette    Office Visit             Future Appointments         Provider Department Appt Notes    In 4 weeks Momo Thomson MD [facility], [address] [facility] Return in about 3 months (around 10/10/2023).     In 3 months Nhi Ulloa MD [facility], [address] [facility] EP/ DM EE

## 2023-10-04 ENCOUNTER — TELEPHONE (OUTPATIENT)
Dept: INTERNAL MEDICINE CLINIC | Facility: CLINIC | Age: 73
End: 2023-10-04

## 2023-10-04 DIAGNOSIS — E11.9 TYPE 2 DIABETES MELLITUS WITHOUT COMPLICATION, WITHOUT LONG-TERM CURRENT USE OF INSULIN (HCC): ICD-10-CM

## 2023-10-04 DIAGNOSIS — R74.8 ELEVATED VITAMIN B12 LEVEL: Primary | ICD-10-CM

## 2023-10-04 DIAGNOSIS — E55.9 VITAMIN D DEFICIENCY: ICD-10-CM

## 2023-10-04 DIAGNOSIS — E61.1 LOW SERUM IRON: ICD-10-CM

## 2023-10-09 NOTE — TELEPHONE ENCOUNTER
Informed patients spouse   Verbalized understanding
Lab orders completed and signed. Patient also needs to get the 12-hour fasting blood work that I ordered on July 10 but was never done.
Spoke to spouse, (on MARYCARMEN, verified patient's name and ). She asked what labs were in the system for patient to get. RN reviewed the labs with her. She requested a Vitamin D, Vitamin B12 and Iron as well. She wanted to update the doctor about patient. She said that patient is arguing a lot with her lately and seems to be in denial that something is wrong with him. He is very disinterested in life or any activities and just sits around. He lies a lot. She will be at the appointment with patient but spouse does not want to argue in front of Dr. Licha Webster. She said that he is moving his shoulders oddly as well as his jaw. She explains it as he is restless or seems uncomfortable but he will deny it. She will bring this up to Dr. Licha Webster because she said patient will deny and lie about this. Dr. Licha Webster, please advise on labs and FYI about other concerns. Rn relayed that patient's last two Vitamin D Levels were normal. If needed, please advise on diagnosis code.
Patient requests all Lab and Radiology Results on their Discharge Instructions

## 2023-10-13 ENCOUNTER — LAB ENCOUNTER (OUTPATIENT)
Dept: LAB | Age: 73
End: 2023-10-13
Attending: INTERNAL MEDICINE
Payer: COMMERCIAL

## 2023-10-13 ENCOUNTER — TELEPHONE (OUTPATIENT)
Dept: INTERNAL MEDICINE CLINIC | Facility: CLINIC | Age: 73
End: 2023-10-13

## 2023-10-13 DIAGNOSIS — E11.9 TYPE 2 DIABETES MELLITUS WITHOUT COMPLICATION, UNSPECIFIED WHETHER LONG TERM INSULIN USE (HCC): ICD-10-CM

## 2023-10-13 DIAGNOSIS — Z00.00 LABORATORY EXAMINATION ORDERED AS PART OF A ROUTINE GENERAL MEDICAL EXAMINATION: Primary | ICD-10-CM

## 2023-10-13 LAB
ALBUMIN SERPL-MCNC: 3.9 G/DL (ref 3.4–5)
ALBUMIN/GLOB SERPL: 1 {RATIO} (ref 1–2)
ALP LIVER SERPL-CCNC: 67 U/L
ALT SERPL-CCNC: 26 U/L
ANION GAP SERPL CALC-SCNC: 4 MMOL/L (ref 0–18)
AST SERPL-CCNC: 21 U/L (ref 15–37)
BILIRUB SERPL-MCNC: 0.9 MG/DL (ref 0.1–2)
BUN BLD-MCNC: 16 MG/DL (ref 7–18)
CALCIUM BLD-MCNC: 8.9 MG/DL (ref 8.5–10.1)
CHLORIDE SERPL-SCNC: 107 MMOL/L (ref 98–112)
CHOLEST SERPL-MCNC: 182 MG/DL (ref ?–200)
CO2 SERPL-SCNC: 28 MMOL/L (ref 21–32)
CREAT BLD-MCNC: 1.2 MG/DL
EGFRCR SERPLBLD CKD-EPI 2021: 64 ML/MIN/1.73M2 (ref 60–?)
GLOBULIN PLAS-MCNC: 4.1 G/DL (ref 2.8–4.4)
GLUCOSE BLD-MCNC: 100 MG/DL (ref 70–99)
HDLC SERPL-MCNC: 63 MG/DL (ref 40–59)
IRON SATN MFR SERPL: 31 %
IRON SERPL-MCNC: 122 UG/DL
LDLC SERPL CALC-MCNC: 104 MG/DL (ref ?–100)
NONHDLC SERPL-MCNC: 119 MG/DL (ref ?–130)
OSMOLALITY SERPL CALC.SUM OF ELEC: 289 MOSM/KG (ref 275–295)
POTASSIUM SERPL-SCNC: 3.9 MMOL/L (ref 3.5–5.1)
PROT SERPL-MCNC: 8 G/DL (ref 6.4–8.2)
SODIUM SERPL-SCNC: 139 MMOL/L (ref 136–145)
T4 FREE SERPL-MCNC: 0.9 NG/DL (ref 0.8–1.7)
TIBC SERPL-MCNC: 390 UG/DL (ref 240–450)
TRANSFERRIN SERPL-MCNC: 262 MG/DL (ref 200–360)
TRIGL SERPL-MCNC: 82 MG/DL (ref 30–149)
TSI SER-ACNC: 6.04 MIU/ML (ref 0.36–3.74)
VIT B12 SERPL-MCNC: 569 PG/ML (ref 193–986)
VIT D+METAB SERPL-MCNC: 51.3 NG/ML (ref 30–100)
VLDLC SERPL CALC-MCNC: 14 MG/DL (ref 0–30)

## 2023-10-13 PROCEDURE — 82607 VITAMIN B-12: CPT | Performed by: INTERNAL MEDICINE

## 2023-10-13 PROCEDURE — 84439 ASSAY OF FREE THYROXINE: CPT | Performed by: INTERNAL MEDICINE

## 2023-10-13 PROCEDURE — 83550 IRON BINDING TEST: CPT | Performed by: INTERNAL MEDICINE

## 2023-10-13 PROCEDURE — 83540 ASSAY OF IRON: CPT | Performed by: INTERNAL MEDICINE

## 2023-10-13 PROCEDURE — 82306 VITAMIN D 25 HYDROXY: CPT | Performed by: INTERNAL MEDICINE

## 2023-10-13 PROCEDURE — 80061 LIPID PANEL: CPT | Performed by: INTERNAL MEDICINE

## 2023-10-13 PROCEDURE — 80053 COMPREHEN METABOLIC PANEL: CPT | Performed by: INTERNAL MEDICINE

## 2023-10-13 PROCEDURE — 84443 ASSAY THYROID STIM HORMONE: CPT | Performed by: INTERNAL MEDICINE

## 2023-10-13 NOTE — TELEPHONE ENCOUNTER
Per patients wife Alba Munson (verbal release confirmed) patient went to lab today and was told no urine test on file. RN reviewed chart and microalbumin, LIPID, TSH and CMP ordered 7/10/23 were not ran with other labs today. RN added on lab test to blood in lab and advised patient would need to submit urine to lab. Wife/patient verbalized understanding but requested RN reorder labs to ensure  would see order. This was completed. Called reference lab and was given satellite lab #[id number], spoke with Edison Eugene and she stated add on was successful.

## 2023-10-16 ENCOUNTER — LAB ENCOUNTER (OUTPATIENT)
Dept: LAB | Age: 73
End: 2023-10-16
Attending: INTERNAL MEDICINE
Payer: MEDICARE

## 2023-10-16 ENCOUNTER — OFFICE VISIT (OUTPATIENT)
Facility: CLINIC | Age: 73
End: 2023-10-16
Payer: MEDICARE

## 2023-10-16 VITALS
SYSTOLIC BLOOD PRESSURE: 104 MMHG | RESPIRATION RATE: 18 BRPM | BODY MASS INDEX: 19.74 KG/M2 | WEIGHT: 141 LBS | HEIGHT: 71 IN | TEMPERATURE: 98 F | DIASTOLIC BLOOD PRESSURE: 56 MMHG | HEART RATE: 74 BPM

## 2023-10-16 DIAGNOSIS — E11.9 TYPE 2 DIABETES MELLITUS WITHOUT COMPLICATION, WITHOUT LONG-TERM CURRENT USE OF INSULIN (HCC): Primary | ICD-10-CM

## 2023-10-16 DIAGNOSIS — G31.9 BRAIN ATROPHY (HCC): ICD-10-CM

## 2023-10-16 DIAGNOSIS — E11.9 TYPE 2 DIABETES MELLITUS WITHOUT COMPLICATION, WITHOUT LONG-TERM CURRENT USE OF INSULIN (HCC): ICD-10-CM

## 2023-10-16 DIAGNOSIS — E03.9 HYPOTHYROIDISM, UNSPECIFIED TYPE: ICD-10-CM

## 2023-10-16 DIAGNOSIS — E11.9 TYPE 2 DIABETES MELLITUS WITHOUT COMPLICATION, UNSPECIFIED WHETHER LONG TERM INSULIN USE (HCC): ICD-10-CM

## 2023-10-16 DIAGNOSIS — I10 ESSENTIAL HYPERTENSION WITH GOAL BLOOD PRESSURE LESS THAN 140/90: ICD-10-CM

## 2023-10-16 DIAGNOSIS — R41.89 COGNITIVE IMPAIRMENT: ICD-10-CM

## 2023-10-16 LAB
CARTRIDGE LOT#: 612 NUMERIC
CHOLEST SERPL-MCNC: 176 MG/DL (ref ?–200)
CREAT UR-SCNC: 67.8 MG/DL
FASTING PATIENT LIPID ANSWER: YES
HDLC SERPL-MCNC: 71 MG/DL (ref 40–59)
HEMOGLOBIN A1C: 6.3 % (ref 4.3–5.6)
LDLC SERPL CALC-MCNC: 91 MG/DL (ref ?–100)
MICROALBUMIN UR-MCNC: <0.5 MG/DL
NONHDLC SERPL-MCNC: 105 MG/DL (ref ?–130)
TRIGL SERPL-MCNC: 75 MG/DL (ref 30–149)
VLDLC SERPL CALC-MCNC: 12 MG/DL (ref 0–30)

## 2023-10-16 PROCEDURE — 99214 OFFICE O/P EST MOD 30 MIN: CPT | Performed by: INTERNAL MEDICINE

## 2023-10-16 PROCEDURE — 3074F SYST BP LT 130 MM HG: CPT | Performed by: INTERNAL MEDICINE

## 2023-10-16 PROCEDURE — 3008F BODY MASS INDEX DOCD: CPT | Performed by: INTERNAL MEDICINE

## 2023-10-16 PROCEDURE — 83036 HEMOGLOBIN GLYCOSYLATED A1C: CPT | Performed by: INTERNAL MEDICINE

## 2023-10-16 PROCEDURE — 1160F RVW MEDS BY RX/DR IN RCRD: CPT | Performed by: INTERNAL MEDICINE

## 2023-10-16 PROCEDURE — 3044F HG A1C LEVEL LT 7.0%: CPT | Performed by: INTERNAL MEDICINE

## 2023-10-16 PROCEDURE — 1159F MED LIST DOCD IN RCRD: CPT | Performed by: INTERNAL MEDICINE

## 2023-10-16 PROCEDURE — 82570 ASSAY OF URINE CREATININE: CPT

## 2023-10-16 PROCEDURE — 3078F DIAST BP <80 MM HG: CPT | Performed by: INTERNAL MEDICINE

## 2023-10-16 PROCEDURE — 80061 LIPID PANEL: CPT

## 2023-10-16 PROCEDURE — 1126F AMNT PAIN NOTED NONE PRSNT: CPT | Performed by: INTERNAL MEDICINE

## 2023-10-16 PROCEDURE — 82043 UR ALBUMIN QUANTITATIVE: CPT

## 2023-10-16 PROCEDURE — 3061F NEG MICROALBUMINURIA REV: CPT | Performed by: INTERNAL MEDICINE

## 2023-10-16 RX ORDER — BLOOD SUGAR DIAGNOSTIC
STRIP MISCELLANEOUS
Qty: 100 STRIP | Refills: 5 | Status: SHIPPED | OUTPATIENT
Start: 2023-10-16

## 2023-10-16 RX ORDER — LANCETS 30 GAUGE
EACH MISCELLANEOUS
COMMUNITY
End: 2023-10-16

## 2023-10-16 RX ORDER — ISOPROPYL ALCOHOL 0.75 G/1
SWAB TOPICAL
Qty: 100 EACH | Refills: 3 | Status: SHIPPED | OUTPATIENT
Start: 2023-10-16

## 2023-10-16 RX ORDER — INDOMETHACIN 50 MG/1
50 CAPSULE ORAL
Qty: 30 CAPSULE | Refills: 1 | Status: SHIPPED | OUTPATIENT
Start: 2023-10-16

## 2023-10-16 RX ORDER — LANCETS 30 GAUGE
1 EACH MISCELLANEOUS DAILY
Qty: 100 EACH | Refills: 3 | Status: SHIPPED | OUTPATIENT
Start: 2023-10-16

## 2023-10-18 DIAGNOSIS — E11.9 TYPE 2 DIABETES MELLITUS WITHOUT COMPLICATION, WITHOUT LONG-TERM CURRENT USE OF INSULIN (HCC): ICD-10-CM

## 2023-10-18 RX ORDER — LANCETS 30 GAUGE
EACH MISCELLANEOUS
Qty: 300 EACH | Refills: 3 | Status: CANCELLED | OUTPATIENT
Start: 2023-10-18

## 2023-10-18 NOTE — TELEPHONE ENCOUNTER
Current Outpatient Medications:     Glucose Blood (ACCU-CHEK KRISTI PLUS) In Vitro Strip, USE ONCE DAILY, Disp: 100 strip, Rfl: 5    Alcohol Swabs (BD SWAB SINGLE USE REGULAR) Does not apply Pads, Use a directed once daily, Disp: 100 each, Rfl: 3    Lancets Does not apply Misc, 1 Lancet daily. , Disp: 100 each, Rfl: 3    Blood Glucose Calibration (TRUE METRIX LEVEL 1) Low In Vitro Solution, 1 each by In Vitro route daily. , Disp: 1 each, Rfl: 3    Rfl: 3    Blood Glucose Monitoring Suppl ([address]) W/DEVICE Does not apply Kit, apply 1 by Misc. (Non-Drug; Combo Route) route, test by intradermal route twice a day, Disp: , Rfl:

## 2023-10-19 RX ORDER — GLUCOSAM/CHON-MSM1/C/MANG/BOSW 500-416.6
1 TABLET ORAL DAILY
Qty: 100 EACH | Refills: 3 | Status: SHIPPED | OUTPATIENT
Start: 2023-10-19

## 2023-10-19 RX ORDER — BLOOD-GLUCOSE CONTROL, LOW
1 EACH MISCELLANEOUS AS NEEDED
Qty: 3 EACH | Refills: 3 | Status: SHIPPED | OUTPATIENT
Start: 2023-10-19

## 2023-10-19 RX ORDER — ISOPROPYL ALCOHOL 0.75 G/1
1 SWAB TOPICAL DAILY
Qty: 100 EACH | Refills: 3 | Status: SHIPPED | OUTPATIENT
Start: 2023-10-19

## 2023-10-19 RX ORDER — CALCIUM CITRATE/VITAMIN D3 200MG-6.25
1 TABLET ORAL DAILY
Qty: 100 STRIP | Refills: 3 | Status: SHIPPED | OUTPATIENT
Start: 2023-10-19

## 2023-10-19 RX ORDER — SYRING-NEEDL,DISP,INSUL,0.3 ML 30 GX5/16"
1 SYRINGE, EMPTY DISPOSABLE MISCELLANEOUS DAILY
Qty: 1 EACH | Refills: 1 | Status: SHIPPED | OUTPATIENT
Start: 2023-10-19

## 2023-10-19 NOTE — TELEPHONE ENCOUNTER
Refill passed per [place], [facility] protocol. New rx needed with correct Dx codes. Please sign. Thank you    Requested Prescriptions   Pending Prescriptions Disp Refills    Blood Glucose Calibration (TRUE METRIX LEVEL 1) Low In Vitro Solution 3 each 3     Si kit by Other route as needed. Gently swirl before use. *do not shake*  Control Tests should be performed: For practice to ensure your testing technique is good. Occasionally as you use a vial of test strips. When opening a new vial of test strips. If results seem unusually high or low. If a vial has been left opened or exposed to extreme heat or cold, or humidity. Whenever a check on performance of the system is needed. If meter damage is suspected.        Diabetic Supplies Protocol Passed - 10/18/2023  8:38 AM        Passed - In person appointment or virtual visit in the past 12 mos or appointment in next 3 mos     Recent Outpatient Visits              3 days ago Type 2 diabetes mellitus without complication, without long-term current use of insulin ([address]    Karo Curran MD    Office Visit    3 months ago Encounter for annual health examination    Karo Curran MD    Office Visit    7 months ago Type 2 diabetes mellitus without complication, without long-term current use of insulin [facility])    Karo Curran MD    Office Visit    9 months ago Type 2 diabetes mellitus without retinopathy [facility])    [address] [facility] [place],[address], [address], [place], Imelda Mckeon MD    Office Visit    10 months ago Encounter for annual health examination    Trent Lindsey Lan Brownie, MD    Office Visit          Future Appointments         Provider Department Appt Notes    In 1 week [facility] [place], [address] [facility], [address] [facility]     In 3 months MD Yuval [address] Dr recommended to [employer]    In 3 months Susan Horta MD [address], [address] [facility] Diabetes follow up    In 6 months Rosalee Smith MD [address], [address], [place] EP/ DM EE                        Alcohol Swabs (BD SWAB SINGLE USE REGULAR) Does not apply Pads 100 each 3     Sig: Apply 1 Pad topically daily. Use a directed once daily       There is no refill protocol information for this order       Glucose Blood (TRUE METRIX BLOOD GLUCOSE TEST) In Vitro Strip 100 strip 3     Si strip by In Vitro route daily.        Diabetic Supplies Protocol Passed - 10/18/2023  8:38 AM        Passed - In person appointment or virtual visit in the past 12 mos or appointment in next 3 mos     Recent Outpatient Visits              3 days ago Type 2 diabetes mellitus without complication, without long-term current use of insulin ([address]    Rajat Vega MD    Office Visit    3 months ago Encounter for annual health examination    Rajat Vega MD    Office Visit    7 months ago Type 2 diabetes mellitus without complication, without long-term current use of insulin [facility])    Rajat Vega MD    Office Visit    9 months ago Type 2 diabetes mellitus without retinopathy [facility])    [address], [address], [place], Page Choudhary MD    Office Visit    10 months ago Encounter for annual health examination    Rajat Vega MD    Office Visit          Future Appointments         Provider Department Appt Notes    In 1 week Kim [address] [facility], [address]     In 3 months MD Yane Hinson [facility] Dr Brynn Torres recommended to [email or web address]    In 3 months Tita Reid MD [address], [address] [facility] Diabetes follow up    In 6 months Amie Asencio MD [address], [address], [address] EP/ DM EE                        Blood Glucose Monitoring Suppl (TRUE METRIX METER) w/Device Does not apply Kit 1 kit 0     Si kit daily.        Diabetic Supplies Protocol Passed - 10/18/2023  8:38 AM        Passed - In person appointment or virtual visit in the past 12 mos or appointment in next 3 mos     Recent Outpatient Visits              3 days ago Type 2 diabetes mellitus without complication, without long-term current use of insulin ([facility] [address]    Cristino Sutton MD    Office Visit    3 months ago Encounter for annual health examination    Cristino Sutton MD    Office Visit    7 months ago Type 2 diabetes mellitus without complication, without long-term current use of insulin [facility])    Cristino Sutton MD    Office Visit    9 months ago Type 2 diabetes mellitus without retinopathy [facility])    [address], [address], [place], Todd Hendricks MD    Office Visit    10 months ago Encounter for annual health examination    Trent Ivy Deeann [place], MD    Office Visit          Future Appointments         Provider Department Appt Notes    In 1 week [address], [place], [address]     In 3 months MD Yane H[address] Dr recommended to [email or web address] In 3 months Mac Miller MD [address], [address] [facility] Diabetes follow up    In 6 months Maria E Ho MD [address], [address], [place] EP/ DM EE                        TRUEplus Lancets 30G Does not apply Misc 100 each 3     Si Lancet by Finger stick route daily. There is no refill protocol information for this order       Lancet Device Does not apply Misc 1 each 1     Si Lancet by Finger stick route daily.        There is no refill protocol information for this order        Future Appointments         Provider Department Appt Notes    In 1 week [address] [facility] [address] [place], [address] [facility]     In 3 months MD Maria Fernanda Cas[address] Dr recommended to [email or web address]    In 3 months Mac Miller MD [address], [address] [facility] Diabetes follow up    In 6 months Maria E Ho MD [address], [address], [place] EP/ DM EE          Recent Outpatient Visits              3 days ago Type 2 diabetes mellitus without complication, without long-term current use of insulin [facility])    [address], [address] [facility] MD lizette    Office Visit    3 months ago Encounter for annual health examination    Mable Devries MD    Office Visit    7 months ago Type 2 diabetes mellitus without complication, without long-term current use of insulin [facility])    Mable Devries MD    Office Visit    9 months ago Type 2 diabetes mellitus without retinopathy [facility])    [address] [place] [address], Elly Sanchez MD    Office Visit    10 months ago Encounter for annual health examination    [address], [address] [facility], [place], Gaurav Reid MD    Office Visit

## 2023-10-26 ENCOUNTER — IMMUNIZATION (OUTPATIENT)
Dept: LAB | Age: 73
End: 2023-10-26
Attending: EMERGENCY MEDICINE

## 2023-10-26 DIAGNOSIS — Z23 NEED FOR VACCINATION: Primary | ICD-10-CM

## 2023-10-26 PROCEDURE — 90480 ADMN SARSCOV2 VAC 1/ONLY CMP: CPT

## 2023-12-28 ENCOUNTER — TELEPHONE (OUTPATIENT)
Dept: INTERNAL MEDICINE CLINIC | Facility: CLINIC | Age: 73
End: 2023-12-28

## 2023-12-28 NOTE — TELEPHONE ENCOUNTER
Pts spouse nujack on MARYCARMEN wanted to [place] if Pt should be taking RSV vaccine? Pts spouse said pt already had flu and covis vaccine done.  Please advise

## 2023-12-28 NOTE — TELEPHONE ENCOUNTER
Call pt or wife. Based on age and medical conditions, the patient does qualify for the RSV vaccine. I think it would be in his best interests to get the shot.

## 2023-12-29 NOTE — TELEPHONE ENCOUNTER
Queried chart but no information about the recent FLU and COVID  vaccines===see Karl's note below. MyChart message sent .

## 2023-12-29 NOTE — TELEPHONE ENCOUNTER
Spoke with pt and his wife,  verified   Pt's wife stated on pt last ov with Dr Licha Webster 10-16-23, pt received a flu shot on left arm (not 100 % sure which arm they used) but no record was found. Then after the flu shot pt was given a paper regarding flu vaccine information. Then on 10-26, pt got his covid shot at [facility] facility and they asked if he already got his flu shot as no record was found on his chart. Pt's wife told to the Nurse at [facility] facility that pt already rec'd flu shot during his last ov with PCP. Pt's wife remembered the person that room pt was the one that give pt his flu shot. Also pt's wife mentioned on pt lov Dr Licha Webster supposed to give pt a maintenance rx for gout but only Indomethacin was sent to the pharm. Pt will discuss this further with PCP on his next ov on 24. She is concerned about the flu shot that was not documented. Routed to site staff for assistance on flu vaccine, pls call pt's wife. Thanks.           Future Appointments   Date Time Provider Marino Gomez   2024  3:20 PM Laverne Simons MD [facility] [place]   2024  3:20 PM Srinivas Woods MD [facility]   2024  2:30 PM Keya Mayorga MD [facility]

## 2023-12-29 NOTE — TELEPHONE ENCOUNTER
Immunizations Given    Not given: FLU VAC High Dose 72 YRS & Older PRSV Free (69023) Incomplete Doc  Immunization History  Level of

## 2024-02-12 ENCOUNTER — OFFICE VISIT (OUTPATIENT)
Facility: CLINIC | Age: 74
End: 2024-02-12
Payer: MEDICARE

## 2024-02-12 VITALS
RESPIRATION RATE: 18 BRPM | BODY MASS INDEX: 19.37 KG/M2 | HEIGHT: 71 IN | SYSTOLIC BLOOD PRESSURE: 98 MMHG | WEIGHT: 138.38 LBS | TEMPERATURE: 98 F | DIASTOLIC BLOOD PRESSURE: 56 MMHG | HEART RATE: 84 BPM

## 2024-02-12 DIAGNOSIS — E11.9 TYPE 2 DIABETES MELLITUS WITHOUT COMPLICATION, WITHOUT LONG-TERM CURRENT USE OF INSULIN (HCC): Primary | ICD-10-CM

## 2024-02-12 DIAGNOSIS — Z01.00 DIABETIC EYE EXAM (HCC): ICD-10-CM

## 2024-02-12 DIAGNOSIS — E11.9 DIABETIC EYE EXAM (HCC): ICD-10-CM

## 2024-02-12 DIAGNOSIS — E03.9 HYPOTHYROIDISM, UNSPECIFIED TYPE: ICD-10-CM

## 2024-02-12 DIAGNOSIS — I10 ESSENTIAL HYPERTENSION WITH GOAL BLOOD PRESSURE LESS THAN 140/90: ICD-10-CM

## 2024-02-12 DIAGNOSIS — H91.90 HEARING LOSS, UNSPECIFIED HEARING LOSS TYPE, UNSPECIFIED LATERALITY: ICD-10-CM

## 2024-02-12 LAB
CARTRIDGE LOT#: 637 NUMERIC
HEMOGLOBIN A1C: 6.3 % (ref 4.3–5.6)

## 2024-02-12 PROCEDURE — 3008F BODY MASS INDEX DOCD: CPT | Performed by: INTERNAL MEDICINE

## 2024-02-12 PROCEDURE — 3078F DIAST BP <80 MM HG: CPT | Performed by: INTERNAL MEDICINE

## 2024-02-12 PROCEDURE — 3074F SYST BP LT 130 MM HG: CPT | Performed by: INTERNAL MEDICINE

## 2024-02-12 PROCEDURE — 1160F RVW MEDS BY RX/DR IN RCRD: CPT | Performed by: INTERNAL MEDICINE

## 2024-02-12 PROCEDURE — 1170F FXNL STATUS ASSESSED: CPT | Performed by: INTERNAL MEDICINE

## 2024-02-12 PROCEDURE — 3044F HG A1C LEVEL LT 7.0%: CPT | Performed by: INTERNAL MEDICINE

## 2024-02-12 PROCEDURE — 99214 OFFICE O/P EST MOD 30 MIN: CPT | Performed by: INTERNAL MEDICINE

## 2024-02-12 PROCEDURE — 83036 HEMOGLOBIN GLYCOSYLATED A1C: CPT | Performed by: INTERNAL MEDICINE

## 2024-02-12 PROCEDURE — 1126F AMNT PAIN NOTED NONE PRSNT: CPT | Performed by: INTERNAL MEDICINE

## 2024-02-12 PROCEDURE — 1159F MED LIST DOCD IN RCRD: CPT | Performed by: INTERNAL MEDICINE

## 2024-02-12 RX ORDER — GLIPIZIDE 5 MG/1
5 TABLET, FILM COATED, EXTENDED RELEASE ORAL DAILY
Qty: 90 TABLET | Refills: 3 | Status: CANCELLED | OUTPATIENT
Start: 2024-02-12

## 2024-02-12 RX ORDER — GLIPIZIDE 5 MG/1
5 TABLET, FILM COATED, EXTENDED RELEASE ORAL DAILY
Qty: 90 TABLET | Refills: 3 | Status: SHIPPED | OUTPATIENT
Start: 2024-02-12

## 2024-02-12 RX ORDER — LISINOPRIL 5 MG/1
5 TABLET ORAL DAILY
Qty: 90 TABLET | Refills: 3 | Status: SHIPPED | OUTPATIENT
Start: 2024-02-12

## 2024-02-12 RX ORDER — LEVOTHYROXINE SODIUM 0.03 MG/1
25 TABLET ORAL
Qty: 90 TABLET | Refills: 3 | Status: CANCELLED | OUTPATIENT
Start: 2024-02-12

## 2024-02-12 RX ORDER — LEVOTHYROXINE SODIUM 0.03 MG/1
25 TABLET ORAL
Qty: 90 TABLET | Refills: 3 | Status: SHIPPED | OUTPATIENT
Start: 2024-02-12

## 2024-02-12 RX ORDER — LISINOPRIL 5 MG/1
5 TABLET ORAL DAILY
Qty: 90 TABLET | Refills: 3 | Status: CANCELLED | OUTPATIENT
Start: 2024-02-12

## 2024-02-12 NOTE — PROGRESS NOTES
HPI:    Patient ID: Johnny Kern is a 73 year old male.    HPI  Patient is here for follow-up on chronic medical issues as listed below.  I last saw him in October.  At that time A1c was 6.3.  Full blood work has been done.  At that time his wife was quite upset about the patient's behavior.  She was very frustrated with him.  Patient at that time had little to say about all of this.  We did not change anything at that time.  He has just seen the neurologist a few days ago.  He and the wife went over much of the same territory and that visit as we discussed in the previous visit.  No changes were made at that time.  Current medications reviewed.  Health maintenance and vaccination status reviewed.     No complaints. No pain Diet is good; appetite is good. Weight is down about 3 pounds. Not much exercise in cold weather months. GOes to [facility] once a day for prayers. No depression or anxiety. Pt feels his memory is ok. No tobacco or EtOH. He is complaint with meds.    Wife left him for vacation for a few weeks; the family felt that he did ok when she was gone. He drove to [place] by himself. Wife feels that she pampered him. Now she feels that he has proven him able to do things. Still with asking things over and over. She feels that he does not have dementia at this time. Ramadan comes up in March. Patient does check sugar at home. Averages- 7d-105, 14d-97, 30d-97, 90d-96.     Point-of-care A1c testing done today is 6.3.      Patient Active Problem List   Diagnosis    Type II diabetes mellitus (HCC)    Essential hypertension    Presbyopia    Type 2 diabetes mellitus without retinopathy (HCC)    Age-related nuclear cataract of both eyes    Floaters, right    Brain atrophy (HCC)          HISTORY:  Past Medical History:   Diagnosis Date    Chronic headaches     Type II or unspecified type diabetes mellitus without mention of complication, not stated as uncontrolled       No past surgical history on file.   Family  History   Problem Relation Age of Onset    Cancer Father     Cancer Mother         esophagus    Diabetes Neg     Glaucoma Neg     Macular degeneration Neg       Social History     Socioeconomic History    Marital status:    Tobacco Use    Smoking status: Former     Packs/day: .25     Types: Cigarettes     Quit date: 2002     Years since quittin.1    Smokeless tobacco: Never   Vaping Use    Vaping Use: Never used   Substance and Sexual Activity    Alcohol use: No    Drug use: No    Sexual activity: Not Currently     Partners: Female   Other Topics Concern    Caffeine Concern Yes     Comment: 1 cup tea daily    Exercise Yes     Comment: walking    Pt has a pacemaker No    Pt has a defibrillator No    Reaction to local anesthetic No   Social History Narrative    The patient does not use an assistive device..      The patient does live in a home with stairs.          Review of Systems          Current Outpatient Medications   Medication Sig Dispense Refill    Blood Glucose Calibration (TRUE METRIX LEVEL 1) Low In Vitro Solution 1 kit by Other route as needed. Gently swirl before use. *do not shake*  Control Tests should be performed: For practice to ensure your testing technique is good. Occasionally as you use a vial of test strips. When opening a new vial of test strips. If results seem unusually high or low. If a vial has been left opened or exposed to extreme heat or cold, or humidity. Whenever a check on performance of the system is needed. If meter damage is suspected. 3 each 3    Alcohol Swabs (BD SWAB SINGLE USE REGULAR) Does not apply Pads Apply 1 Pad topically daily. Use a directed once daily 100 each 3    Glucose Blood (TRUE METRIX BLOOD GLUCOSE TEST) In Vitro Strip 1 strip by In Vitro route daily. 100 strip 3    Blood Glucose Monitoring Suppl (TRUE METRIX METER) w/Device Does not apply Kit 1 kit daily. 1 kit 0    TRUEplus Lancets 30G Does not apply Misc 1 Lancet by Finger stick route daily.  100 each 3    Lancet Device Does not apply Misc 1 Lancet by Finger stick route daily. 1 each 1    Lancets Does not apply Misc 1 Lancet daily. 100 each 3    indomethacin 50 MG Oral Cap Take 1 capsule (50 mg total) by mouth 3 (three) times daily with meals. As needed for gout flare and pain 30 capsule 1    lisinopril 5 MG Oral Tab Take 1 tablet (5 mg total) by mouth daily. 90 tablet 3    levothyroxine 25 MCG Oral Tab Take 1 tablet (25 mcg total) by mouth before breakfast. 90 tablet 3    glipiZIDE ER 5 MG Oral Tablet 24 Hr Take 1 tablet (5 mg total) by mouth daily. 90 tablet 3    ergocalciferol 1.25 MG (90365 UT) Oral Cap Take 1 capsule (50,000 Units total) by mouth every 30 (thirty) days. 3 capsule 3    mirtazapine 15 MG Oral Tab Take 1 tablet (15 mg total) by mouth nightly. 90 tablet 3    metFORMIN 500 MG Oral Tab Take 1 tablet (500 mg total) by mouth 2 (two) times daily with meals. 180 tablet 3     Allergies:  Allergies   Allergen Reactions    Penicillins UNKNOWN        PHYSICAL EXAM:   BP 98/56 (BP Location: Left arm, Patient Position: Sitting, Cuff Size: large)   Pulse 84   Temp 98.4 °F (36.9 °C) (Other)   Resp 18   Ht 5' 11\" (1.803 m)   Wt 138 lb 6.4 oz (62.8 kg)   BMI 19.30 kg/m²      Physical Exam  Constitutional:       Appearance: Normal appearance. He is well-developed.   HENT:      Nose: Nose normal.      Mouth/Throat:      Pharynx: No oropharyngeal exudate or posterior oropharyngeal erythema.   Eyes:      Conjunctiva/sclera: Conjunctivae normal.   Neck:      Vascular: No carotid bruit.   Cardiovascular:      Rate and Rhythm: Normal rate and regular rhythm.      Pulses: Normal pulses.      Heart sounds: Normal heart sounds. No murmur heard.  Pulmonary:      Effort: Pulmonary effort is normal.      Breath sounds: Normal breath sounds. No wheezing or rales.   Abdominal:      General: Bowel sounds are normal.      Palpations: Abdomen is soft. There is no mass.      Tenderness: There is no abdominal  tenderness.   Musculoskeletal:      Right lower leg: No edema.      Left lower leg: No edema.   Lymphadenopathy:      Cervical: No cervical adenopathy.   Skin:     General: Skin is warm and dry.      Findings: No rash.   Neurological:      General: No focal deficit present.      Mental Status: He is alert.   Psychiatric:         Mood and Affect: Mood normal.         Behavior: Behavior normal.         Thought Content: Thought content normal.          Wt Readings from Last 6 Encounters:   02/12/24 138 lb 6.4 oz (62.8 kg)   02/08/24 143 lb (64.9 kg)   10/16/23 141 lb (64 kg)   07/10/23 145 lb (65.8 kg)   03/06/23 146 lb (66.2 kg)   11/28/22 142 lb (64.4 kg)             ASSESSMENT/PLAN:   1. Type 2 diabetes mellitus without complication, without long-term current use of insulin (HCC)  Diabetes continues to be very well-controlled.  A1c under 7 today.  Continue current treatment.  He will be entering the fasting month of Ramadan soon.  He is advised to do as he has done in the past which is typically to stop the glipizide during that month.  Continue to monitor blood sugars.  - HEMOGLOBIN A1C    2. Hearing loss, unspecified hearing loss type, unspecified laterality  Patient encouraged to consider evaluation by audiology.    3. Diabetic eye exam ([facility])  Refer to ophthalmology for evaluation.  - Ophthalmology Referral - In Network    4. Essential hypertension with goal blood pressure less than 140/90  Well-controlled.  Continue current treatment.    5. Hypothyroidism, unspecified type  Controlled.  Continue current treatment.         Meds This Visit:  Requested Prescriptions     Pending Prescriptions Disp Refills    glipiZIDE ER 5 MG Oral Tablet 24 Hr 90 tablet 3     Sig: Take 1 tablet (5 mg total) by mouth daily.    levothyroxine 25 MCG Oral Tab 90 tablet 3     Sig: Take 1 tablet (25 mcg total) by mouth before breakfast.    lisinopril 5 MG Oral Tab 90 tablet 3     Sig: Take 1 tablet (5 mg total) by mouth daily.     metFORMIN 500 MG Oral Tab 180 tablet 3     Sig: Take 1 tablet (500 mg total) by mouth 2 (two) times daily with meals.       Imaging & Referrals:  OPHTHALMOLOGY - INTERNAL         Sonny Gallagher MD

## 2024-04-17 ENCOUNTER — OFFICE VISIT (OUTPATIENT)
Dept: OPHTHALMOLOGY | Facility: CLINIC | Age: 74
End: 2024-04-17
Payer: MEDICARE

## 2024-04-17 ENCOUNTER — TELEPHONE (OUTPATIENT)
Dept: INTERNAL MEDICINE CLINIC | Facility: CLINIC | Age: 74
End: 2024-04-17

## 2024-04-17 DIAGNOSIS — H25.13 AGE-RELATED NUCLEAR CATARACT OF BOTH EYES: ICD-10-CM

## 2024-04-17 DIAGNOSIS — E11.9 TYPE 2 DIABETES MELLITUS WITHOUT RETINOPATHY (HCC): Primary | ICD-10-CM

## 2024-04-17 DIAGNOSIS — H43.391 FLOATERS, RIGHT: ICD-10-CM

## 2024-04-17 PROCEDURE — 1159F MED LIST DOCD IN RCRD: CPT | Performed by: OPHTHALMOLOGY

## 2024-04-17 PROCEDURE — 1160F RVW MEDS BY RX/DR IN RCRD: CPT | Performed by: OPHTHALMOLOGY

## 2024-04-17 PROCEDURE — 2023F DILAT RTA XM W/O RTNOPTHY: CPT | Performed by: OPHTHALMOLOGY

## 2024-04-17 PROCEDURE — 3072F LOW RISK FOR RETINOPATHY: CPT | Performed by: OPHTHALMOLOGY

## 2024-04-17 PROCEDURE — 92014 COMPRE OPH EXAM EST PT 1/>: CPT | Performed by: OPHTHALMOLOGY

## 2024-04-17 NOTE — PATIENT INSTRUCTIONS
Type 2 diabetes mellitus without retinopathy (HCC)  Diabetes type II: no background of retinopathy, no signs of neovascularization noted.  Discussed ocular and systemic benefits of blood sugar control.  Diagnosis and treatment discussed in detail with patient.    Will see patient in 1 year for a diabetic exam    Age-related nuclear cataract of both eyes  Discussed early cataracts with patient. Told patient that cataracts are age appropriate and they are not surgical at this time.  No treatment recommended at this time.     Copy of glasses Rx given    Floaters, right   There is no evidence of retinal pathology.  All signs and symptoms of retinal detachment/tears explained in detail.    Patient instructed to call the office if they experience increase in floaters, increase in flashes of light, loss of vision or curtain or veil effect.

## 2024-04-17 NOTE — ASSESSMENT & PLAN NOTE
There is no evidence of retinal pathology.  All signs and symptoms of retinal detachment/tears explained in detail.    Patient instructed to call the office if they experience increase in floaters, increase in flashes of light, loss of vision or curtain or veil effect.

## 2024-04-17 NOTE — PROGRESS NOTES
Johnny Kern is a 73 year old male.    HPI:     HPI    Patient here for diabetic eye exam.  Patient denies any problems or changes with his eyes. Patient complaints of his left eye being red,wife says started 2- 3 weeks ago. Patient only did warm compress twice a day. Patient wife says he has memory loss.       Pt has been a diabetic for 25+ years       Pt's diabetes is currently controlled by pills  Pt checks BS every other day   Pt's last blood sugar was  93 on 4/15/24  Last HA1C was 6.3 on 24  Endocrinologist: none  Last edited by Archana Chester OT on 2024  3:16 PM.        Patient History:  Past Medical History:    Chronic headaches    Type II or unspecified type diabetes mellitus without mention of complication, not stated as uncontrolled       Surgical History: Johnny Kern has no past surgical history on file.    Family History   Problem Relation Age of Onset    Cancer Father     Cancer Mother         esophagus    Diabetes Neg     Glaucoma Neg     Macular degeneration Neg        Social History:   Social History     Socioeconomic History    Marital status:    Tobacco Use    Smoking status: Former     Current packs/day: 0.00     Types: Cigarettes     Quit date: 2002     Years since quittin.3    Smokeless tobacco: Never   Vaping Use    Vaping status: Never Used   Substance and Sexual Activity    Alcohol use: No    Drug use: No    Sexual activity: Not Currently     Partners: Female   Other Topics Concern    Caffeine Concern Yes     Comment: 1 cup tea daily    Exercise Yes     Comment: walking    Pt has a pacemaker No    Pt has a defibrillator No    Reaction to local anesthetic No   Social History Narrative    The patient does not use an assistive device..      The patient does live in a home with stairs.       Medications:  Current Outpatient Medications   Medication Sig Dispense Refill    glipiZIDE ER 5 MG Oral Tablet 24 Hr Take 1 tablet (5 mg total) by mouth daily. 90 tablet 3     levothyroxine 25 MCG Oral Tab Take 1 tablet (25 mcg total) by mouth before breakfast. 90 tablet 3    lisinopril 5 MG Oral Tab Take 1 tablet (5 mg total) by mouth daily. 90 tablet 3    metFORMIN 500 MG Oral Tab Take 1 tablet (500 mg total) by mouth 2 (two) times daily with meals. 180 tablet 3    Blood Glucose Calibration (TRUE METRIX LEVEL 1) Low In Vitro Solution 1 kit by Other route as needed. Gently swirl before use. *do not shake*  Control Tests should be performed: For practice to ensure your testing technique is good. Occasionally as you use a vial of test strips. When opening a new vial of test strips. If results seem unusually high or low. If a vial has been left opened or exposed to extreme heat or cold, or humidity. Whenever a check on performance of the system is needed. If meter damage is suspected. 3 each 3    Alcohol Swabs ([employer] (d.b.a. [employer] Kangaroo) SWAB SINGLE USE REGULAR) Does not apply Pads Apply 1 Pad topically daily. Use a directed once daily 100 each 3    Glucose Blood (TRUE METRIX BLOOD GLUCOSE TEST) In Vitro Strip 1 strip by In Vitro route daily. 100 strip 3    Blood Glucose Monitoring Suppl (TRUE METRIX METER) w/Device Does not apply Kit 1 kit daily. 1 kit 0    TRUEplus Lancets 30G Does not apply Misc 1 Lancet by Finger stick route daily. 100 each 3    Lancet Device Does not apply Misc 1 Lancet by Finger stick route daily. 1 each 1    Lancets Does not apply Misc 1 Lancet daily. 100 each 3    indomethacin 50 MG Oral Cap Take 1 capsule (50 mg total) by mouth 3 (three) times daily with meals. As needed for gout flare and pain 30 capsule 1    ergocalciferol 1.25 MG (15119 UT) Oral Cap Take 1 capsule (50,000 Units total) by mouth every 30 (thirty) days. 3 capsule 3    mirtazapine 15 MG Oral Tab Take 1 tablet (15 mg total) by mouth nightly. 90 tablet 3       Allergies:  Allergies   Allergen Reactions    Penicillins UNKNOWN       ROS:     ROS    Positive for: Eyes  Last edited by Yulia Blue OT on 4/17/2024  2:40  PM.          PHYSICAL EXAM:     Base Eye Exam       Visual Acuity (Snellen - Linear)         Right Left    Dist cc 20/25 +1 20/25 +1    Near cc 20/25 20/25      Correction: Glasses              Tonometry (Icare, 3:02 PM)         Right Left    Pressure 17 16              Pupils         Pupils    Right PERRL    Left PERRL              Visual Fields         Left Right     Full Full              Extraocular Movement         Right Left     Full, Ortho Full, Ortho              Neuro/Psych       Oriented x3: Yes    Mood/Affect: Normal              Dilation       Both eyes: 1.0% Mydriacyl and 2.5% Og Synephrine @ 3:03 PM              Dilation #2       Both eyes: 1.0% Mydriacyl and 2.5% Og Synephrine @ 3:03 PM                  Slit Lamp and Fundus Exam       External Exam         Right Left    External Normal Normal              Slit Lamp Exam         Right Left    Lids/Lashes Meibomian gland dysfunction, 1+ Scurf Meibomian gland dysfunction, 1+ Scurf    Conjunctiva/Sclera Temp pinguecula, Ocular Melanosis Temp pinguecula    Cornea 3+arcus 3+arcus    Anterior Chamber Deep and quiet Deep and quiet    Iris Normal Normal    Lens 3+ Nuclear sclerosis 3+ Nuclear sclerosis, trace cortical    Vitreous Vitreous floaters Clear              Fundus Exam         Right Left    Disc Good rim, Temporal crescent Good rim, Temporal crescent    C/D Ratio 0.3 0.3    Macula Normal, no BDR Normal, no BDR    Vessels Normal Normal    Periphery Normal Normal                  Refraction       Wearing Rx         Sphere Cylinder Axis Add    Right [place] +0.75 070 +2.50    Left +0.25 +0.25 005 +2.50      Age: 1yr    Type: Progressive bifocal   Patient does not want refraction.             Final Rx         Sphere Cylinder Axis Add    Right [place] +0.75 070 +2.50    Left +0.25 +0.25 005 +2.50      Type: Progressive bifocal                     ASSESSMENT/PLAN:     Diagnoses and Plan:     Type 2 diabetes mellitus without retinopathy (HCC)  Diabetes type  II: no background of retinopathy, no signs of neovascularization noted.  Discussed ocular and systemic benefits of blood sugar control.  Diagnosis and treatment discussed in detail with patient.    Will see patient in 1 year for a diabetic exam    Age-related nuclear cataract of both eyes  Discussed early cataracts with patient. Told patient that cataracts are age appropriate and they are not surgical at this time.  No treatment recommended at this time.     Copy of glasses Rx given    Floaters, right   There is no evidence of retinal pathology.  All signs and symptoms of retinal detachment/tears explained in detail.    Patient instructed to call the office if they experience increase in floaters, increase in flashes of light, loss of vision or curtain or veil effect.     No orders of the defined types were placed in this encounter.      Meds This Visit:  Requested Prescriptions      No prescriptions requested or ordered in this encounter        Follow up instructions:  Return in about 1 year (around 4/17/2025) for Diabetic eye exam.    4/17/2024  Scribed by: Camden Hamilton MD

## 2024-04-17 NOTE — TELEPHONE ENCOUNTER
Pts spouse came in requesting a sooner appt then June    Spouse also requested a referral for Neurologist Dr Cunningham    To please call her back, spouse requested to speak to nurse asap

## 2024-04-17 NOTE — ASSESSMENT & PLAN NOTE
Discussed early cataracts with patient. Told patient that cataracts are age appropriate and they are not surgical at this time.  No treatment recommended at this time.     Copy of glasses Rx given

## 2024-04-26 ENCOUNTER — LAB ENCOUNTER (OUTPATIENT)
Dept: LAB | Age: 74
End: 2024-04-26
Attending: INTERNAL MEDICINE
Payer: MEDICARE

## 2024-04-26 ENCOUNTER — OFFICE VISIT (OUTPATIENT)
Dept: INTERNAL MEDICINE CLINIC | Facility: CLINIC | Age: 74
End: 2024-04-26
Payer: MEDICARE

## 2024-04-26 VITALS
WEIGHT: 139.81 LBS | BODY MASS INDEX: 19.57 KG/M2 | DIASTOLIC BLOOD PRESSURE: 75 MMHG | HEART RATE: 74 BPM | HEIGHT: 71 IN | SYSTOLIC BLOOD PRESSURE: 118 MMHG | OXYGEN SATURATION: 99 %

## 2024-04-26 DIAGNOSIS — E11.9 TYPE 2 DIABETES MELLITUS WITHOUT COMPLICATION, WITHOUT LONG-TERM CURRENT USE OF INSULIN (HCC): Primary | ICD-10-CM

## 2024-04-26 DIAGNOSIS — G31.9 BRAIN ATROPHY (HCC): ICD-10-CM

## 2024-04-26 DIAGNOSIS — E03.9 HYPOTHYROIDISM, UNSPECIFIED TYPE: ICD-10-CM

## 2024-04-26 DIAGNOSIS — E11.9 TYPE 2 DIABETES MELLITUS WITHOUT COMPLICATION, WITHOUT LONG-TERM CURRENT USE OF INSULIN (HCC): ICD-10-CM

## 2024-04-26 DIAGNOSIS — Z86.59 HISTORY OF DEPRESSION: ICD-10-CM

## 2024-04-26 DIAGNOSIS — R41.89 COGNITIVE IMPAIRMENT: ICD-10-CM

## 2024-04-26 LAB
ALBUMIN SERPL-MCNC: 4.7 G/DL (ref 3.2–4.8)
ALBUMIN/GLOB SERPL: 1.5 {RATIO} (ref 1–2)
ALP LIVER SERPL-CCNC: 68 U/L
ALT SERPL-CCNC: 21 U/L
ANION GAP SERPL CALC-SCNC: 5 MMOL/L (ref 0–18)
AST SERPL-CCNC: 20 U/L (ref ?–34)
BILIRUB SERPL-MCNC: 0.4 MG/DL (ref 0.2–1.1)
BUN BLD-MCNC: 18 MG/DL (ref 9–23)
BUN/CREAT SERPL: 18.8 (ref 10–20)
CALCIUM BLD-MCNC: 9.9 MG/DL (ref 8.7–10.4)
CHLORIDE SERPL-SCNC: 104 MMOL/L (ref 98–112)
CO2 SERPL-SCNC: 31 MMOL/L (ref 21–32)
CREAT BLD-MCNC: 0.96 MG/DL
CREAT UR-SCNC: 83.5 MG/DL
EGFRCR SERPLBLD CKD-EPI 2021: 83 ML/MIN/1.73M2 (ref 60–?)
FASTING STATUS PATIENT QL REPORTED: NO
GLOBULIN PLAS-MCNC: 3.1 G/DL (ref 2.8–4.4)
GLUCOSE BLD-MCNC: 145 MG/DL (ref 70–99)
MICROALBUMIN UR-MCNC: <0.3 MG/DL
OSMOLALITY SERPL CALC.SUM OF ELEC: 294 MOSM/KG (ref 275–295)
POTASSIUM SERPL-SCNC: 4.4 MMOL/L (ref 3.5–5.1)
PROT SERPL-MCNC: 7.8 G/DL (ref 5.7–8.2)
SODIUM SERPL-SCNC: 140 MMOL/L (ref 136–145)
T4 FREE SERPL-MCNC: 1.2 NG/DL (ref 0.8–1.7)
TSI SER-ACNC: 5.37 MIU/ML (ref 0.55–4.78)

## 2024-04-26 PROCEDURE — 82570 ASSAY OF URINE CREATININE: CPT

## 2024-04-26 PROCEDURE — 36415 COLL VENOUS BLD VENIPUNCTURE: CPT

## 2024-04-26 PROCEDURE — G2211 COMPLEX E/M VISIT ADD ON: HCPCS | Performed by: INTERNAL MEDICINE

## 2024-04-26 PROCEDURE — 84439 ASSAY OF FREE THYROXINE: CPT

## 2024-04-26 PROCEDURE — 3008F BODY MASS INDEX DOCD: CPT | Performed by: INTERNAL MEDICINE

## 2024-04-26 PROCEDURE — 1160F RVW MEDS BY RX/DR IN RCRD: CPT | Performed by: INTERNAL MEDICINE

## 2024-04-26 PROCEDURE — 99214 OFFICE O/P EST MOD 30 MIN: CPT | Performed by: INTERNAL MEDICINE

## 2024-04-26 PROCEDURE — 3074F SYST BP LT 130 MM HG: CPT | Performed by: INTERNAL MEDICINE

## 2024-04-26 PROCEDURE — 3078F DIAST BP <80 MM HG: CPT | Performed by: INTERNAL MEDICINE

## 2024-04-26 PROCEDURE — 80053 COMPREHEN METABOLIC PANEL: CPT

## 2024-04-26 PROCEDURE — 84443 ASSAY THYROID STIM HORMONE: CPT

## 2024-04-26 PROCEDURE — 1126F AMNT PAIN NOTED NONE PRSNT: CPT | Performed by: INTERNAL MEDICINE

## 2024-04-26 PROCEDURE — 82043 UR ALBUMIN QUANTITATIVE: CPT

## 2024-04-26 PROCEDURE — 1159F MED LIST DOCD IN RCRD: CPT | Performed by: INTERNAL MEDICINE

## 2024-04-26 PROCEDURE — 3061F NEG MICROALBUMINURIA REV: CPT | Performed by: INTERNAL MEDICINE

## 2024-04-26 NOTE — PROGRESS NOTES
HPI:    Patient ID: Johnny Kern is a 73 year old male.    HPI  Patient is here for follow-up on chronic medical issues as listed below.  I last saw him in the office February 12.  Things were going well at that time.  A1c was 6.3.  He was feeling reasonably good.  Recently took a vacation and went to [place] by himself.  He made his wife feel that he was more capable of taking care of himself and maybe she had about before.    Wife brings in a note ahead of time today.  Patient is is becoming difficult with eating.  He is less interested in things.  Issues with not eating and not taking care of himself.  Not interested in family or grandchildren.  They are concerned about his overall functioning.  He did have an episode a few years ago where he got significantly worse with decrease in memory and cognition.  Ultimately deemed to be episode of severe depression back around 2020.    Patient does check sugar at home. It has been running around 7d- 87, 14d- 89, 30d- 98. Wife states that he is having issues as above. She states-  He stopped taking mirtazapine. He is getting weaker. Wife denies the accusation that she is hitting him. He has a biologic son from a prior marriage in [place]; the patistns wife is involving them more. Wife wants the patient to care for himself better, eat properly. She requests the brain scan.He sleeps for 12 hours. She bought him crossword puzzles; not doing it very much. Wife is having a difficult time with the situation. They will be going to [place] soon. They will also be going to [place] to meet his son and family. Wife states that this situation is affecting the family. She states he does not always take the mirtazapine.    No complaints at this time from the patient. Appetite is ok. Weight is ok; he lost some weight during Ramadan. For exercise, he walks around. GOes to [place] daily. He still drives. Recently got a ticket. He states he is compliant with meds. Wife states that he  stops the mirtazapine from time to time. Wife states he has a lot of extra mirtazapine at home.           Patient Active Problem List   Diagnosis    Type II diabetes mellitus (HCC)    Essential hypertension    Presbyopia    Type 2 diabetes mellitus without retinopathy (HCC)    Age-related nuclear cataract of both eyes    Floaters, right    Brain atrophy (HCC)    Abdominal pain    Abdominal pain, right upper quadrant    Abnormal results of thyroid function studies    Acute pharyngitis    Acute upper respiratory infection    Benign neoplasm of skin    Calcaneal spur    Conjunctival hemorrhage    Contusion of foot    Dermatomycosis    Disease of the oral soft tissues    Disturbances in tooth eruption    Dyspepsia and disorder of function of stomach    Flatulence, eructation and gas pain    Impotence of organic origin    Low back pain    Obesity    Pain in joint    Pain in joint, forearm    Pain in soft tissues of limb    Plantar fascial fibromatosis    Type II diabetes mellitus with neurological manifestations ([facility])          HISTORY:  Past Medical History:    Chronic headaches    Type II or unspecified type diabetes mellitus without mention of complication, not stated as uncontrolled      History reviewed. No pertinent surgical history.   Family History   Problem Relation Age of Onset    Cancer Father     Cancer Mother         esophagus    Diabetes Neg     Glaucoma Neg     Macular degeneration Neg       Social History     Socioeconomic History    Marital status:    Tobacco Use    Smoking status: Former     Current packs/day: 0.00     Types: Cigarettes     Quit date: 2002     Years since quittin.3    Smokeless tobacco: Never   Vaping Use    Vaping status: Never Used   Substance and Sexual Activity    Alcohol use: No    Drug use: No    Sexual activity: Not Currently     Partners: Female   Other Topics Concern    Caffeine Concern Yes     Comment: 1 cup tea daily    Exercise Yes     Comment: walking    Pt  has a pacemaker No    Pt has a defibrillator No    Reaction to local anesthetic No   Social History Narrative    The patient does not use an assistive device..      The patient does live in a home with stairs.          Review of Systems          Current Outpatient Medications   Medication Sig Dispense Refill    glipiZIDE ER 5 MG Oral Tablet 24 Hr Take 1 tablet (5 mg total) by mouth daily. 90 tablet 3    levothyroxine 25 MCG Oral Tab Take 1 tablet (25 mcg total) by mouth before breakfast. 90 tablet 3    lisinopril 5 MG Oral Tab Take 1 tablet (5 mg total) by mouth daily. 90 tablet 3    metFORMIN 500 MG Oral Tab Take 1 tablet (500 mg total) by mouth 2 (two) times daily with meals. 180 tablet 3    Blood Glucose Calibration (TRUE METRIX LEVEL 1) Low In Vitro Solution 1 kit by Other route as needed. Gently swirl before use. *do not shake*  Control Tests should be performed: For practice to ensure your testing technique is good. Occasionally as you use a vial of test strips. When opening a new vial of test strips. If results seem unusually high or low. If a vial has been left opened or exposed to extreme heat or cold, or humidity. Whenever a check on performance of the system is needed. If meter damage is suspected. 3 each 3    Alcohol Swabs (BD SWAB SINGLE USE REGULAR) Does not apply Pads Apply 1 Pad topically daily. Use a directed once daily 100 each 3    Glucose Blood (TRUE METRIX BLOOD GLUCOSE TEST) In Vitro Strip 1 strip by In Vitro route daily. 100 strip 3    Blood Glucose Monitoring Suppl (TRUE METRIX METER) w/Device Does not apply Kit 1 kit daily. 1 kit 0    Lancet Device Does not apply Misc 1 Lancet by Finger stick route daily. 1 each 1    indomethacin 50 MG Oral Cap Take 1 capsule (50 mg total) by mouth 3 (three) times daily with meals. As needed for gout flare and pain 30 capsule 1    ergocalciferol 1.25 MG (64133 UT) Oral Cap Take 1 capsule (50,000 Units total) by mouth every 30 (thirty) days. 3 capsule 3     mirtazapine 15 MG Oral Tab Take 1 tablet (15 mg total) by mouth nightly. 90 tablet 3    TRUEplus Lancets 30G Does not apply Misc 1 Lancet by Finger stick route daily. 100 each 3    Lancets Does not apply Misc 1 Lancet daily. 100 each 3     Allergies:  Allergies   Allergen Reactions    Penicillins UNKNOWN        PHYSICAL EXAM:   /75   Pulse 74   Ht 5' 11\" (1.803 m)   Wt 139 lb 12.8 oz (63.4 kg)   SpO2 99%   BMI 19.50 kg/m²      Physical Exam  Constitutional:       Appearance: Normal appearance. He is well-developed.   HENT:      Nose: Nose normal.      Mouth/Throat:      Pharynx: No oropharyngeal exudate or posterior oropharyngeal erythema.   Eyes:      Conjunctiva/sclera: Conjunctivae normal.   Neck:      Vascular: No carotid bruit.   Cardiovascular:      Rate and Rhythm: Normal rate and regular rhythm.      Pulses: Normal pulses.      Heart sounds: Normal heart sounds. No murmur heard.  Pulmonary:      Effort: Pulmonary effort is normal.      Breath sounds: Normal breath sounds. No wheezing or rales.   Abdominal:      General: Bowel sounds are normal.      Palpations: Abdomen is soft. There is no mass.      Tenderness: There is no abdominal tenderness.   Musculoskeletal:      Right lower leg: No edema.      Left lower leg: No edema.   Lymphadenopathy:      Cervical: No cervical adenopathy.   Skin:     General: Skin is warm and dry.      Findings: No rash.   Neurological:      General: No focal deficit present.      Mental Status: He is alert.   Psychiatric:         Mood and Affect: Mood normal.         Behavior: Behavior normal.          Wt Readings from Last 6 Encounters:   04/26/24 139 lb 12.8 oz (63.4 kg)   02/12/24 138 lb 6.4 oz (62.8 kg)   02/08/24 143 lb (64.9 kg)   10/16/23 141 lb (64 kg)   07/10/23 145 lb (65.8 kg)   03/06/23 146 lb (66.2 kg)             ASSESSMENT/PLAN:   1. Type 2 diabetes mellitus without complication, without long-term current use of insulin (HCC)  Diabetes is  well-controlled.  A1c in mid February was 6.3.  Continue current treatment.    2. Hypothyroidism, unspecified type  We will check a TSH and adjust medication if needed.  Perhaps this is contributing to his issues.  - Assay, Thyroid Stim Hormone; Future    3. Cognitive impairment  Patient with issues previously of cognitive impairment with depression.  Some evidence on brain atrophy on previous studies.  Had a serious episode a few years ago.  Wife is concerned that things are getting worse.  Patient is not as concerned at this point.  He does seem a little bit less with it at this time.  Due to the decline in the previous abnormal MRI, we will repeat an MRI.  Also refer to neurology.  In addition because of prior issues with depression, refer to psychiatry.  - Neuro Referral - [facility] ([place])  - [facility] Referral - In Network  - MRI BRAIN (W+WO) (CPT=70553); Future    4. Brain atrophy (HCC)  As stated above, refer to neurology, psychiatry, and MRI of the brain.  - Neuro Referral - [facility] ([place])  - MRI BRAIN (W+WO) (CPT=70553); Future    5. History of depression  Previous episodes of cognitive decline have been tied to depression.  Patient advised that he needs to take the mirtazapine every day.  Follow-up with psychiatry and neurology.  - [facility] Referral - In Network         Meds This Visit:  Requested Prescriptions      No prescriptions requested or ordered in this encounter       Imaging & Referrals:  None         Sonny Gallagher MD

## 2024-05-02 ENCOUNTER — TELEPHONE (OUTPATIENT)
Dept: INTERNAL MEDICINE CLINIC | Facility: CLINIC | Age: 74
End: 2024-05-02

## 2024-05-02 NOTE — TELEPHONE ENCOUNTER
I will complete the note shortly.  Patient can use Gas-X or Mylanta for the burping.  Take according to the instructions on the bottle.

## 2024-05-02 NOTE — TELEPHONE ENCOUNTER
Wife calling for Patient. Verified name and date of birth.  Wife states she cannot see after visit summary or notes from office visit 4/26/24.  Informed wife, Dr. Glalagher has not signed off on the encounter and possible reason for this.  Advised to give Dr. Gallagher a few days and check back on Mychart.    Wife also following up on MRI authorization. Per wife, their insurance has not gotten any request for authorization. Informed wife, per chart, it is pending review at this time and will reach out to [facility] for update. Patient has MRI scheduled for 5/31/24.    Wife and Patient requesting for medication to help decrease gas. Patient is constantly burping. Per wife, she notices this happening more and more, but the Patient does not realize it. Per wife, this was discussed at last office visit. Please advise on medication.

## 2024-05-03 ENCOUNTER — TELEPHONE (OUTPATIENT)
Age: 74
End: 2024-05-03

## 2024-05-03 NOTE — TELEPHONE ENCOUNTER
[facility]   Dr. Itz Angulo   [address],   [place], [address]  Phone: [phone]  [email or web address]      [facility] (Medication management/psychiatry)  Dr. Powers   [address] [place] [address]  [email or web address]  [phone]  [email or web address]      [facility]   Dr. Jose Suresh   [address],   [place], [address]  Phone: [phone]  [email or web address]    Dr. Lucero   [address] [place] [address]   [address]  [place] [address]  Phone: [phone]  [email or web address]      Dr. Hahn  [facility]  [address]  [address]  [facility] [address]  Phone: [phone]  [email or web address]

## 2024-05-06 ENCOUNTER — PATIENT MESSAGE (OUTPATIENT)
Dept: INTERNAL MEDICINE CLINIC | Facility: CLINIC | Age: 74
End: 2024-05-06

## 2024-05-06 DIAGNOSIS — F32.A DEPRESSION, UNSPECIFIED DEPRESSION TYPE: Primary | ICD-10-CM

## 2024-05-07 NOTE — TELEPHONE ENCOUNTER
From: Johnny Kern  To: Sonny Gallagher  Sent: 5/6/2024 5:53 PM CDT  Subject: Need referral for this facility     Good evening Dr Gallagher, I made appointment with this Psychiatrist and we need a referral for this Dr and appointment is on May 29th at 3:40 PM  Thanks     [facility]   Dr. Jose Suresh   [address],   [place], [address]  Phone: [phone]  [email or web address]

## 2024-05-09 ENCOUNTER — MED REC SCAN ONLY (OUTPATIENT)
Dept: INTERNAL MEDICINE CLINIC | Facility: CLINIC | Age: 74
End: 2024-05-09

## 2024-05-13 ENCOUNTER — TELEPHONE (OUTPATIENT)
Age: 74
End: 2024-05-13

## 2024-05-24 ENCOUNTER — TELEPHONE (OUTPATIENT)
Age: 74
End: 2024-05-24

## 2024-05-28 ENCOUNTER — TELEPHONE (OUTPATIENT)
Age: 74
End: 2024-05-28

## 2024-05-28 NOTE — TELEPHONE ENCOUNTER
Grayson Lyons MD  [facility]   [address]  [place] [address]  Phone: [phone]    Rajiv Crane MD  [facility]  [address] [place] [address]  Phone: [phone]      Bev Pelaez    [facility]  [phone]

## 2024-05-29 ENCOUNTER — TELEPHONE (OUTPATIENT)
Age: 74
End: 2024-05-29

## 2024-05-31 ENCOUNTER — HOSPITAL ENCOUNTER (OUTPATIENT)
Dept: MRI IMAGING | Age: 74
Discharge: HOME | End: 2024-05-31
Attending: INTERNAL MEDICINE
Payer: MEDICARE

## 2024-05-31 DIAGNOSIS — G31.9 BRAIN ATROPHY (HCC): ICD-10-CM

## 2024-05-31 DIAGNOSIS — R41.89 COGNITIVE IMPAIRMENT: ICD-10-CM

## 2024-05-31 PROCEDURE — A9575 INJ GADOTERATE MEGLUMI 0.1ML: HCPCS

## 2024-05-31 PROCEDURE — 70553 MRI BRAIN STEM W/O & W/DYE: CPT | Performed by: INTERNAL MEDICINE

## 2024-05-31 RX ORDER — GADOTERATE MEGLUMINE 376.9 MG/ML
15 INJECTION INTRAVENOUS
Status: COMPLETED | OUTPATIENT
Start: 2024-05-31 | End: 2024-05-31

## 2024-05-31 RX ADMIN — GADOTERATE MEGLUMINE 13 ML: 376.9 INJECTION INTRAVENOUS at 19:07:00

## 2024-07-08 ENCOUNTER — TELEPHONE (OUTPATIENT)
Dept: INTERNAL MEDICINE CLINIC | Facility: CLINIC | Age: 74
End: 2024-07-08

## 2024-07-08 DIAGNOSIS — Z00.00 LABORATORY EXAM ORDERED AS PART OF ROUTINE GENERAL MEDICAL EXAMINATION: Primary | ICD-10-CM

## 2024-07-08 DIAGNOSIS — D64.9 ANEMIA, UNSPECIFIED TYPE: ICD-10-CM

## 2024-07-08 DIAGNOSIS — E55.9 VITAMIN D DEFICIENCY: ICD-10-CM

## 2024-07-08 DIAGNOSIS — E11.9 TYPE 2 DIABETES MELLITUS WITHOUT RETINOPATHY (HCC): ICD-10-CM

## 2024-07-08 NOTE — TELEPHONE ENCOUNTER
Patient's wife, Soifa calling (name and , MARYCARMEN verified) to verify Dr Gallagher fax number. Number provided was [facility] [phone].

## 2024-07-08 NOTE — TELEPHONE ENCOUNTER
Patient's spouse Sofia called (on MARYCARMEN), verified patient's Name and . States patient will be seeing primary care provider on 7/15 for annual physical. Spouse requesting for CBC, vitamin B12 and vitamin D ordered in addition to the routine blood work.     Dr. Gallagher pended order for review and approval if appropriate.     She also wants to update Dr. Gallagher that patient was seen by Dr. Itz Angulo recently and mirtazapine was increased to 1-1/2 tablets since patient is in deep depression.     Patient also has dementia and is unable to recognize his grandsons any longer. Patient has high anxiety level, cries and gets hyper during office visits. She wants to make sure that Dr. Gallagher will \"enforce\" patient to see his neurologist for followup; she thinks otherwise, patient will not go.    Patient has poor appetite and is not eating properly. Also has problems with hearing, \"not hearing good, and will be needing referral.\"    Spouse also states that they have been traveling and patient has been restless in the plane touching his private part. Spouse said that patient has been flagged in the screening equipment at the airport twice, asked if he has any metal in his body, spouse said no and that he always wears jeans. Relayed that it could have been something in the material of his jeans that caused this. She wants provider to check just the same.    Spouse wants Dr. Gallagher to be aware of this ahead of time because she does not want to be called a liar during the appointment, and that she will not say anything any more during the office visit.      She is requesting for office visit notes from Dr. Angulo's office to be faxed to primary care provider. Called Dr. Angulo's office and spoke to Fabby, verified patient's Name and . States MARYCARMEN form is needed to be filled out with Dr. Gallagher's name in it. She will email the document to the patient's wife.    [facility] kindly assist, letter of request is needed by  Dr. Angulo's office for this. Please fax to [phone]. Once Dr. Angulo's office visit note is received, spouse requesting if this can be scanned to patient's records so Dr. Cunningham can review it as well.     Future Appointments   Date Time Provider Department Center   7/15/2024  3:20 PM [facility]   7/18/2024  1:00 PM Sheryl Cunningham MD [facility]

## 2024-07-11 NOTE — TELEPHONE ENCOUNTER
Sofia (wife) MARYCARMEN verified     Asking if report has been received from Dr. Angulo office,     Also if labs have been ordered    Please call wife with update.    Advised Sofia labs are still pending approval

## 2024-07-12 NOTE — TELEPHONE ENCOUNTER
Called patient's wife Sofia who has MARYCARMEN permission, ,verified patient's name and date of birth.   Informed that labs approved by Dr Gallagher. She has no further questions.

## 2024-07-12 NOTE — TELEPHONE ENCOUNTER
Patient's wife Raffi calling who has release MARYCARMEN permission, verified name and date of birth.   Raffi asks if Dr Gallagher can please sign lab orders so patient can have blood drawn tomorrow.    Dr Gallagher, please advise.

## 2024-07-13 ENCOUNTER — LAB ENCOUNTER (OUTPATIENT)
Dept: LAB | Facility: HOSPITAL | Age: 74
End: 2024-07-13
Attending: INTERNAL MEDICINE
Payer: MEDICARE

## 2024-07-13 LAB
BASOPHILS # BLD AUTO: 0.03 X10(3) UL (ref 0–0.2)
BASOPHILS NFR BLD AUTO: 0.4 %
CHOLEST SERPL-MCNC: 175 MG/DL (ref ?–200)
EOSINOPHIL # BLD AUTO: 0.19 X10(3) UL (ref 0–0.7)
EOSINOPHIL NFR BLD AUTO: 2.6 %
ERYTHROCYTE [DISTWIDTH] IN BLOOD BY AUTOMATED COUNT: 13.5 %
EST. AVERAGE GLUCOSE BLD GHB EST-MCNC: 131 MG/DL (ref 68–126)
FASTING PATIENT LIPID ANSWER: YES
HBA1C MFR BLD: 6.2 % (ref ?–5.7)
HCT VFR BLD AUTO: 36.3 %
HDLC SERPL-MCNC: 66 MG/DL (ref 40–59)
HGB BLD-MCNC: 12.9 G/DL
IMM GRANULOCYTES # BLD AUTO: 0.02 X10(3) UL (ref 0–1)
IMM GRANULOCYTES NFR BLD: 0.3 %
LDLC SERPL CALC-MCNC: 95 MG/DL (ref ?–100)
LYMPHOCYTES # BLD AUTO: 2.18 X10(3) UL (ref 1–4)
LYMPHOCYTES NFR BLD AUTO: 29.5 %
MCH RBC QN AUTO: 33.2 PG (ref 26–34)
MCHC RBC AUTO-ENTMCNC: 35.5 G/DL (ref 31–37)
MCV RBC AUTO: 93.3 FL
MONOCYTES # BLD AUTO: 0.51 X10(3) UL (ref 0.1–1)
MONOCYTES NFR BLD AUTO: 6.9 %
NEUTROPHILS # BLD AUTO: 4.47 X10 (3) UL (ref 1.5–7.7)
NEUTROPHILS # BLD AUTO: 4.47 X10(3) UL (ref 1.5–7.7)
NEUTROPHILS NFR BLD AUTO: 60.3 %
NONHDLC SERPL-MCNC: 109 MG/DL (ref ?–130)
PLATELET # BLD AUTO: 196 10(3)UL (ref 150–450)
RBC # BLD AUTO: 3.89 X10(6)UL
T4 FREE SERPL-MCNC: 0.8 NG/DL (ref 0.8–1.7)
TRIGL SERPL-MCNC: 74 MG/DL (ref 30–149)
TSI SER-ACNC: 4.96 MIU/ML (ref 0.36–3.74)
VIT B12 SERPL-MCNC: 491 PG/ML (ref 193–986)
VIT D+METAB SERPL-MCNC: 42.8 NG/ML (ref 30–100)
VLDLC SERPL CALC-MCNC: 12 MG/DL (ref 0–30)
WBC # BLD AUTO: 7.4 X10(3) UL (ref 4–11)

## 2024-07-13 PROCEDURE — 83036 HEMOGLOBIN GLYCOSYLATED A1C: CPT | Performed by: INTERNAL MEDICINE

## 2024-07-13 PROCEDURE — 84439 ASSAY OF FREE THYROXINE: CPT | Performed by: INTERNAL MEDICINE

## 2024-07-13 PROCEDURE — 36415 COLL VENOUS BLD VENIPUNCTURE: CPT | Performed by: INTERNAL MEDICINE

## 2024-07-13 PROCEDURE — 83550 IRON BINDING TEST: CPT | Performed by: INTERNAL MEDICINE

## 2024-07-13 PROCEDURE — 83540 ASSAY OF IRON: CPT | Performed by: INTERNAL MEDICINE

## 2024-07-13 PROCEDURE — 85025 COMPLETE CBC W/AUTO DIFF WBC: CPT | Performed by: INTERNAL MEDICINE

## 2024-07-13 PROCEDURE — 82607 VITAMIN B-12: CPT | Performed by: INTERNAL MEDICINE

## 2024-07-13 PROCEDURE — 82306 VITAMIN D 25 HYDROXY: CPT | Performed by: INTERNAL MEDICINE

## 2024-07-13 PROCEDURE — 84443 ASSAY THYROID STIM HORMONE: CPT | Performed by: INTERNAL MEDICINE

## 2024-07-13 PROCEDURE — 80061 LIPID PANEL: CPT | Performed by: INTERNAL MEDICINE

## 2024-07-15 ENCOUNTER — OFFICE VISIT (OUTPATIENT)
Facility: CLINIC | Age: 74
End: 2024-07-15

## 2024-07-15 VITALS
BODY MASS INDEX: 19.93 KG/M2 | WEIGHT: 139.25 LBS | RESPIRATION RATE: 16 BRPM | HEIGHT: 70 IN | TEMPERATURE: 98 F | SYSTOLIC BLOOD PRESSURE: 116 MMHG | DIASTOLIC BLOOD PRESSURE: 72 MMHG

## 2024-07-15 DIAGNOSIS — G31.9 BRAIN ATROPHY (HCC): ICD-10-CM

## 2024-07-15 DIAGNOSIS — E11.49 TYPE II DIABETES MELLITUS WITH NEUROLOGICAL MANIFESTATIONS (HCC): ICD-10-CM

## 2024-07-15 DIAGNOSIS — F33.9 RECURRENT MAJOR DEPRESSIVE DISORDER, REMISSION STATUS UNSPECIFIED (HCC): ICD-10-CM

## 2024-07-15 DIAGNOSIS — R41.89 COGNITIVE IMPAIRMENT: ICD-10-CM

## 2024-07-15 DIAGNOSIS — I10 ESSENTIAL HYPERTENSION: ICD-10-CM

## 2024-07-15 DIAGNOSIS — E55.9 VITAMIN D DEFICIENCY: ICD-10-CM

## 2024-07-15 DIAGNOSIS — D64.9 ANEMIA, UNSPECIFIED TYPE: ICD-10-CM

## 2024-07-15 DIAGNOSIS — Z00.00 ENCOUNTER FOR ANNUAL HEALTH EXAMINATION: Primary | ICD-10-CM

## 2024-07-15 DIAGNOSIS — E03.9 HYPOTHYROIDISM, UNSPECIFIED TYPE: ICD-10-CM

## 2024-07-15 LAB
IRON SATN MFR SERPL: 30 %
IRON SERPL-MCNC: 115 UG/DL
TIBC SERPL-MCNC: 381 UG/DL (ref 240–450)
TRANSFERRIN SERPL-MCNC: 256 MG/DL (ref 200–360)

## 2024-07-15 PROCEDURE — 3044F HG A1C LEVEL LT 7.0%: CPT | Performed by: INTERNAL MEDICINE

## 2024-07-15 PROCEDURE — 3008F BODY MASS INDEX DOCD: CPT | Performed by: INTERNAL MEDICINE

## 2024-07-15 PROCEDURE — 3074F SYST BP LT 130 MM HG: CPT | Performed by: INTERNAL MEDICINE

## 2024-07-15 PROCEDURE — 1126F AMNT PAIN NOTED NONE PRSNT: CPT | Performed by: INTERNAL MEDICINE

## 2024-07-15 PROCEDURE — 96160 PT-FOCUSED HLTH RISK ASSMT: CPT | Performed by: INTERNAL MEDICINE

## 2024-07-15 PROCEDURE — G0439 PPPS, SUBSEQ VISIT: HCPCS | Performed by: INTERNAL MEDICINE

## 2024-07-15 PROCEDURE — 3078F DIAST BP <80 MM HG: CPT | Performed by: INTERNAL MEDICINE

## 2024-07-15 PROCEDURE — 99213 OFFICE O/P EST LOW 20 MIN: CPT | Performed by: INTERNAL MEDICINE

## 2024-07-15 PROCEDURE — 1170F FXNL STATUS ASSESSED: CPT | Performed by: INTERNAL MEDICINE

## 2024-07-15 RX ORDER — MIRTAZAPINE 15 MG/1
22.5 TABLET, FILM COATED ORAL NIGHTLY
COMMUNITY

## 2024-07-15 RX ORDER — LEVOTHYROXINE SODIUM 0.05 MG/1
50 TABLET ORAL
Qty: 90 TABLET | Refills: 1 | Status: SHIPPED | OUTPATIENT
Start: 2024-07-15

## 2024-07-15 NOTE — PROGRESS NOTES
Subjective:   Johnny Kern is a 73 year old male who presents for a MA AHA (Medicare Advantage Annual Health Assessment) and Medicare Wellness Visit charge within the last 11 months and Patient may not meet criteria for AWV: Please evaluate for correct coding and scheduled follow up of multiple significant but stable problems.     Patient is here for Medicare annual wellness visit and follow-up on chronic medical issues as listed below.  Last seen here about 3 months ago.  At that time wife was very concerned about patient's decreased cognition and abilities.  Patient was less concerned about this.  He did did seem somewhat less with it when we saw him.  At that time we ordered MRI as well as evaluation by neurology and behavioral health.  MRI was done.  Showed no changes.  Still some area of encephalomalacia but nothing new.  Current medications reviewed.  Health maintenance and vaccination status reviewed.  Advanced directives reviewed. Appt with Neurologist this week    Patient had blood work done in April and a couple of days ago as well.  CBC showed mild decrease in hemoglobin down to 12.9 from 13.5.  Normal lipid profile, vitamin D, vitamin B-12.  TSH borderline at 4.96 with normal free T4.  A1c of 6.2.  CMP normal except for blood sugar 145.  Urinalysis showed no microalbumin.     No major issues from the patient. Appetite is good. DIet is healthy; he eats fruits; he drinks Ensure (he was unable to remember the name of this). No regular exercise. He goes to [employer] [facility] but does not socialize there. Wife feels he is grinding his teeth. He does not talk with his wife much. He denies depression but the wife feels that he is.  He went to visit the kids and grandkids. There is an upcoming wedding in the family in January in [facility]. Wife states that the patient gives her a hard time with eating food and Ensure. Wife is concerned about the grinding teeth but patient denies the issue. No tobacco or EtOH.        Patient brings in progress note evaluation from psychiatrist, Dr.Asmat Angulo.  Diagnosed with major depressive disorder, recurrent severe without psychotic features.  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  He was started on fluoxetine 20 mg and advised to continue mirtazapine.  Referral was given for neuropsychiatric testing.  Advised to follow-up in 3 weeks.  If that initial visit was on .  He was seen again on .  At that time, the mirtazapine was increased to 1.5 tablets a day. He never started the fluoxetine. Wife states that his stomach is very sensitive. Wife is not able to walk any distance with the patient.         The following is copied and pasted from a telephone entry done about 1 week ago.  Concerns from wife are documented.  \"Patient's spouse Sofia called (on MARYCARMEN), verified patient's Name and . States patient will be seeing primary care provider on 7/15 for annual physical. Spouse requesting for CBC, vitamin B12 and vitamin D ordered in addition to the routine blood work.      Dr. Gallagher pended order for review and approval if appropriate.      She also wants to update Dr. Gallagher that patient was seen by Dr. Itz Angulo recently and mirtazapine was increased to 1-1/2 tablets since patient is in deep depression.      Patient also has dementia and is unable to recognize his grandsons any longer. Patient has high anxiety level, cries and gets hyper during office visits. She wants to make sure that Dr. Gallagher will \"enforce\" patient to see his neurologist for followup; she thinks otherwise, patient will not go.     Patient has poor appetite and is not eating properly. Also has problems with hearing, \"not hearing good, and will be needing referral.\"     Spouse also states that they have been traveling and patient has been restless in the plane touching his private part. Spouse said that patient has been flagged in  the screening equipment at the airport twice, asked if he has any metal in his body, spouse said no and that he always wears jeans. Relayed that it could have been something in the material of his jeans that caused this. She wants provider to check just the same.         History/Other:   Fall Risk Assessment:   He has been screened for Falls and is low risk.      Cognitive Assessment:   Abnormal  What day of the week is this?: Correct  What month is it?: Correct  What year is it?: Correct  Recall \"Ball\": Incorrect  Recall \"Flag\": Incorrect  Recall \"Tree\": Incorrect    Functional Ability/Status:   oJhnny Kern has some abnormal functions as listed below:  He has difficulties Managing Money/Bills based on screening of functional status.He has difficulties Shopping for Groceries based on screening of functional status. He has Hearing problems based on screening of functional status.He has problems with Daily Activities based on screening of functional status. He has problems with Memory based on screening of functional status.       Depression Screening (PHQ):  Patient with significant major depression under the care of psychiatrist.          Advanced Directives:   He does have a Living Will but we do NOT have it on file in Epic.    He does NOT have a Power of  for Health Care. [ ]  Discussed Advance Care Planning with patient (and family/surrogate if present). Standard forms made available to patient in After Visit Summary.      Patient Active Problem List   Diagnosis    Type II diabetes mellitus (HCC)    Essential hypertension    Presbyopia    Type 2 diabetes mellitus without retinopathy (HCC)    Age-related nuclear cataract of both eyes    Floaters, right    Brain atrophy (HCC)    Benign neoplasm of skin    Calcaneal spur    Conjunctival hemorrhage    Contusion of foot    Dermatomycosis    Disease of the oral soft tissues    Disturbances in tooth eruption    Impotence of organic origin    Low back pain     Pain in joint    Pain in joint, forearm    Pain in soft tissues of limb    Plantar fascial fibromatosis    Type II diabetes mellitus with neurological manifestations ([facility])     Allergies:  He is allergic to penicillins.    Current Medications:  Outpatient Medications Marked as Taking for the 7/15/24 encounter (Office Visit) with Sonny Gallagher MD   Medication Sig    mirtazapine 15 MG Oral Tab Take 1.5 tablets (22.5 mg total) by mouth nightly.    glipiZIDE ER 5 MG Oral Tablet 24 Hr Take 1 tablet (5 mg total) by mouth daily.    levothyroxine 25 MCG Oral Tab Take 1 tablet (25 mcg total) by mouth before breakfast.    lisinopril 5 MG Oral Tab Take 1 tablet (5 mg total) by mouth daily.    metFORMIN 500 MG Oral Tab Take 1 tablet (500 mg total) by mouth 2 (two) times daily with meals.    Blood Glucose Calibration (TRUE METRIX LEVEL 1) Low In Vitro Solution 1 kit by Other route as needed. Gently swirl before use. *do not shake*  Control Tests should be performed: For practice to ensure your testing technique is good. Occasionally as you use a vial of test strips. When opening a new vial of test strips. If results seem unusually high or low. If a vial has been left opened or exposed to extreme heat or cold, or humidity. Whenever a check on performance of the system is needed. If meter damage is suspected.    Alcohol Swabs (BD SWAB SINGLE USE REGULAR) Does not apply Pads Apply 1 Pad topically daily. Use a directed once daily    Glucose Blood (TRUE METRIX BLOOD GLUCOSE TEST) In Vitro Strip 1 strip by In Vitro route daily.    Blood Glucose Monitoring Suppl (TRUE METRIX METER) w/Device Does not apply Kit 1 kit daily.    Lancet Device Does not apply Misc 1 Lancet by Finger stick route daily.    indomethacin 50 MG Oral Cap Take 1 capsule (50 mg total) by mouth 3 (three) times daily with meals. As needed for gout flare and pain    ergocalciferol 1.25 MG (64963 UT) Oral Cap Take 1 capsule (50,000 Units total) by mouth every 30  (thirty) days.       Medical History:  He  has a past medical history of Chronic headaches and Type II or unspecified type diabetes mellitus without mention of complication, not stated as uncontrolled.  Surgical History:  He  has no past surgical history on file.   Family History:  His family history includes Cancer in his father and mother.  Social History:  He  reports that he quit smoking about 22 years ago. His smoking use included cigarettes. He has never used smokeless tobacco. He reports that he does not drink alcohol and does not use drugs.    Tobacco:  He smoked tobacco in the past but quit greater than 12 months ago.  Social History     Tobacco Use   Smoking Status Former    Current packs/day: 0.00    Types: Cigarettes    Quit date: 2002    Years since quittin.5   Smokeless Tobacco Never          CAGE Alcohol Screen:   CAGE screening score of 0 on 2024, showing low risk of alcohol abuse.      Patient Care Team:  Sonny Gallagher MD as PCP - General (Internal Medicine)  Sheryl Cunningham MD (NEUROLOGY)    Review of Systems       Objective:   Physical Exam  Constitutional:       Appearance: Normal appearance. He is well-developed.   HENT:      Right Ear: Tympanic membrane and ear canal normal.      Left Ear: Tympanic membrane and ear canal normal.      Nose: Nose normal.      Mouth/Throat:      Pharynx: No oropharyngeal exudate or posterior oropharyngeal erythema.   Eyes:      Conjunctiva/sclera: Conjunctivae normal.      Pupils: Pupils are equal, round, and reactive to light.   Neck:      Thyroid: No thyromegaly.      Vascular: No carotid bruit.   Cardiovascular:      Rate and Rhythm: Normal rate and regular rhythm.      Pulses: Normal pulses.      Heart sounds: Normal heart sounds. No murmur heard.  Pulmonary:      Effort: Pulmonary effort is normal.      Breath sounds: Normal breath sounds. No wheezing or rales.   Abdominal:      General: Bowel sounds are normal.      Palpations: Abdomen is  soft. There is no mass.      Tenderness: There is no abdominal tenderness.   Genitourinary:     Penis: Normal.       Testes: Normal.   Musculoskeletal:      Right lower leg: No edema.      Left lower leg: No edema.   Lymphadenopathy:      Cervical: No cervical adenopathy.   Skin:     General: Skin is warm and dry.      Findings: No rash.   Neurological:      General: No focal deficit present.      Mental Status: He is alert.      Cranial Nerves: No cranial nerve deficit.      Coordination: Coordination normal.   Psychiatric:         Mood and Affect: Mood normal.         Behavior: Behavior normal.      Comments: INteractive. Answers questions. Deflects any concerns brought up by wife or doctor. Decreased short term memory.          /72 (BP Location: Right arm, Patient Position: Sitting, Cuff Size: adult)   Temp 97.8 °F (36.6 °C) (Temporal)   Resp 16   Ht 5' 10\" (1.778 m)   Wt 139 lb 4 oz (63.2 kg)   BMI 19.98 kg/m²  Estimated body mass index is 19.98 kg/m² as calculated from the following:    Height as of this encounter: 5' 10\" (1.778 m).    Weight as of this encounter: 139 lb 4 oz (63.2 kg).    Medicare Hearing Assessment:   Hearing Screening    Time taken: 7/15/2024  3:46 PM  Entry User: Charlotte Gold MA  Screening Method: Questionnaire  I have a problem hearing over the telephone: No I have trouble following the conversations when two or more people are talking at the same time: No   I have trouble understanding things on the TV: No I have to strain to understand conversations: No   I have to worry about missing the telephone ring or doorbell: No I have trouble hearing conversations in a noisy background such as a crowded room or restaurant: Sometimes   I get confused about where sounds come from: No I misunderstand some words in a sentence and need to ask people to repeat themselves: No   I especially have trouble understanding the speech of women and children: No I have trouble understanding the  speaker in a large room such as at a meeting or place of Mandaen: Sometimes   Many people I talk to seem to mumble (or don't speak clearly): No People get annoyed because I misunderstand what they say: No   I misunderstand what others are saying and make inappropriate responses: Sometimes I avoid social activities because I cannot hear well and fear I will reply improperly: No   Family members and friends have told me they think I may have hearing loss: No             Visual Acuity:   Right Eye Visual Acuity: Corrected Right Eye Chart Acuity: 20/30   Left Eye Visual Acuity: Corrected Left Eye Chart Acuity: 20/30   Both Eyes Visual Acuity: Corrected Both Eyes Chart Acuity: 20/30   Able To Tolerate Visual Acuity: Yes        Assessment & Plan:   Johnny Kern is a 73 year old male who presents for a Medicare Assessment.     1. Encounter for annual health examination  Physical exam remarkable for some decrease in memory.  Patient tends to deflect and minimize the concerns about his health that are brought to him.  Active issues as below.  Recent blood test reviewed.  Up-to-date on vaccinations and health maintenance in general.    2. 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  A1c of 6.2.  Continue current treatment.  Encouraged diet and exercise.    3. Anemia, unspecified type  Mild decrease from 13.5.  12.9.  Will check iron levels and contact patient with results to rule out iron deficiency.  - Iron And Tibc; Future    4. Hypothyroidism, unspecified type  Mildly increased TSH.  We will increase the levothyroxine from 25 up to 50 mcg a day.  Repeat levels in 3 months.    5. Recurrent major depressive disorder, remission status unspecified (HCC)  Ongoing significant issues with depression, cognition, daily functioning.  He is seeing the psychiatrist as documented above.  Discussed the importance of taking medications as prescribed by the psychiatrist.    6.  Cognitive impairment  Patient with combination of depression as well as cognitive impairment.  Previous MRI showed encephalomalacia.  He has gone up and down at different points over the past several years.  Does seem to defer some answers to his wife and in general minimizes his complaints.  He will be seeing neurology soon.  Strongly encouraged that he must follow-up with that and proceed with the neurologist's recommendations.  Wife is very concerned.  Patient is less concerned.    7. Brain atrophy (HCC)  Documented on previous MRI.  Treatment as above.    8. Essential hypertension  Well-controlled.  Continue current treatment.    9. Vitamin D deficiency  Recent vitamin D level was normal.  Continue with current level of supplementation.    The patient indicates understanding of these issues and agrees to the plan.  Reinforced healthy diet, lifestyle, and exercise.      No follow-ups on file.     Sonny Gallagher MD, 7/15/2024     Supplementary Documentation:   General Health:  In the past six months, have you lost more than 10 pounds without trying?: 3 - Don't know  Has your appetite been poor?: Yes  Type of Diet: Balanced  How would you describe your daily physical activity?: Light  How would you describe your current health state?: Fair  In the past six months, have you experienced urine leakage?: 0-No  At any time do you feel concerned for the safety/well-being of yourself and/or your children, in your home or elsewhere?: No  Have you had any immunizations at another office such as Influenza, Hepatitis B, Tetanus, or Pneumococcal?: No    Health Maintenance   Topic Date Due    Diabetes Care Foot Exam  10/30/2016    MA Annual Health Assessment  01/01/2024    COVID-19 Vaccine (7 - 2023-24 season) 02/26/2024    PSA  05/11/2024    Influenza Vaccine (1) 10/01/2024    Diabetes Care A1C  01/13/2025    Diabetes Care Dilated Eye Exam  04/17/2025    Diabetes Care: GFR  04/26/2025    Diabetes Care: Microalb/Creat Ratio   04/26/2025    Colorectal Cancer Screening  10/06/2029    Annual Depression Screening  Completed    Fall Risk Screening (Annual)  Completed    Pneumococcal Vaccine: 65+ Years  Completed    Zoster Vaccines  Completed

## 2024-07-18 ENCOUNTER — OFFICE VISIT (OUTPATIENT)
Dept: NEUROLOGY | Facility: CLINIC | Age: 74
End: 2024-07-18
Payer: MEDICARE

## 2024-07-18 VITALS
SYSTOLIC BLOOD PRESSURE: 102 MMHG | RESPIRATION RATE: 16 BRPM | WEIGHT: 140 LBS | DIASTOLIC BLOOD PRESSURE: 60 MMHG | HEART RATE: 74 BPM | BODY MASS INDEX: 20 KG/M2 | OXYGEN SATURATION: 98 %

## 2024-07-18 DIAGNOSIS — G30.9 ALZHEIMER'S DEMENTIA WITH MOOD DISTURBANCE, UNSPECIFIED DEMENTIA SEVERITY, UNSPECIFIED TIMING OF DEMENTIA ONSET (HCC): ICD-10-CM

## 2024-07-18 DIAGNOSIS — F02.83 ALZHEIMER'S DEMENTIA WITH MOOD DISTURBANCE, UNSPECIFIED DEMENTIA SEVERITY, UNSPECIFIED TIMING OF DEMENTIA ONSET (HCC): ICD-10-CM

## 2024-07-18 DIAGNOSIS — R41.9 NEUROCOGNITIVE DISORDER: ICD-10-CM

## 2024-07-18 DIAGNOSIS — G31.9 BRAIN ATROPHY (HCC): ICD-10-CM

## 2024-07-18 PROCEDURE — 3074F SYST BP LT 130 MM HG: CPT | Performed by: OTHER

## 2024-07-18 PROCEDURE — 3078F DIAST BP <80 MM HG: CPT | Performed by: OTHER

## 2024-07-18 PROCEDURE — 1159F MED LIST DOCD IN RCRD: CPT | Performed by: OTHER

## 2024-07-18 PROCEDURE — 99214 OFFICE O/P EST MOD 30 MIN: CPT | Performed by: OTHER

## 2024-07-18 RX ORDER — MEMANTINE HYDROCHLORIDE 10 MG/1
10 TABLET ORAL EVERY EVENING
Qty: 30 TABLET | Refills: 2 | Status: SHIPPED | OUTPATIENT
Start: 2024-07-18

## 2024-07-18 NOTE — PATIENT INSTRUCTIONS
Take Memantine 10 mg every evening    2.  Complete PET brain metabolism     3.  Continue follow up with Dr Angulo                           After your visit at the [place] office  today, please direct any follow up questions or medication needs to the staff in our [place] office so that your concerns may be promptly addressed.  We are available through [employer] or at the numbers below:    The phone number is:   [phone], option 1    The fax number is:  [phone]    Your pharmacy should also send any requests electronically to the [place] office.       Refill policies:    Allow 2-3 business days for refills; controlled substances may take longer.  Contact your pharmacy at least 5 days prior to running out of medication and have them send an electronic request or submit request through the “request refill” option in your [employer] account.  Refills are not addressed on weekends; covering physicians do not authorize routine medications on weekends.  No narcotics or controlled substances are refilled after noon on Fridays or by on call physicians.  By law, narcotics must be electronically prescribed.  A 30 day supply with no refills is the maximum allowed.  If your prescription is due for a refill, you may be due for a follow up appointment.  To best provide you care, patients receiving routine medications need to be seen at least once a year.  Patients receiving narcotic/controlled substance medications need to be seen at least once every 3 months.  In the event that your preferred pharmacy does not have the requested medication in stock (e.g. Backordered), it is your responsibility to find another pharmacy that has the requested medication available.  We will gladly send a new prescription to that pharmacy at your request.    Scheduling Tests:    If your physician has ordered radiology tests such as MRI or CT scans, please contact Central Scheduling at [phone] right away to schedule the test.   Once scheduled, the [facility] Centralized Referral Team will work with your insurance carrier to obtain pre-certification or prior authorization.  Depending on your insurance carrier, approval may take 3-10 days.  It is highly recommended patients assure they have received an authorization before having a test performed.  If test is done without insurance authorization, patient may be responsible for the entire amount billed.      Precertification and Prior Authorizations:  If your physician has recommended that you have a procedure or additional testing performed the [facility] Centralized Referral Team will contact your insurance carrier to obtain pre-certification or prior authorization.    You are strongly encouraged to contact your insurance carrier to verify that your procedure/test has been approved and is a COVERED benefit.  Although the [facility] Centralized Referral Team does its due diligence, the insurance carrier gives the disclaimer that \"Although the procedure is authorized, this does not guarantee payment.\"    Ultimately the patient is responsible for payment.   Thank you for your understanding in this matter.  Paperwork Completion:  If you require FMLA or disability paperwork for your recovery, please make sure to either drop it off or have it faxed to our office at [phone]. Be sure the form has your name and date of birth on it.  The form will be faxed to our Forms Department and they will complete it for you.  There is a 25$ fee for all forms that need to be filled out.  Please be aware there is a 10-14 day turnaround time.  You will need to sign a release of information (MARYCARMEN) form if your paperwork does not come with one.  You may call the Forms Department with any questions at [phone].  Their fax number is [phone].

## 2024-07-18 NOTE — PROGRESS NOTES
Patient reports he is doing well. Wife reports memory loss worse and he gets frustrated. Wife states he grinds his teeth for past 2 weeks and he washes his hands so much, the skin is getting irritated.

## 2024-07-19 ENCOUNTER — TELEPHONE (OUTPATIENT)
Dept: INTERNAL MEDICINE CLINIC | Facility: CLINIC | Age: 74
End: 2024-07-19

## 2024-07-19 NOTE — TELEPHONE ENCOUNTER
Dr. Gallagher, wife is requesting that office visit note 7/15/24 is signed so she can share notes with psychiatrist during appointment 24. Thank you.    Spoke to patient's wife, ok per verbal release form (name and  of patient verified). She is calling to report patient Recommended PET scan. Patient is not on board and wife will not force him. Not going to take memantine since patient is not on board, just will stay on mirtazapine. Patent agreed to walk outside daily for 30 minutes.   Agreeable to see psychiatry. Has appointment 24 at 2:00 pm  Wife is not able to see office visit notes from appointment. Requesting that recent office visit note is signed so psychiatry provider can review it.  Would also like to know if patient can finish previous levothyroxine 25 mg prescription as dose was increased to levothyroxine 50 mg. Advised wife ok for patient to take two 25 mg tablets until 25 mg tablets are gone. Then patient should just take one tablet of levothyroxine 50 mg daily, verbalizes understanding.    Per office visit note 7/15/24, levothyroxine dose was adjusted:  Medication Changes    Levothyroxine Sodium 50 mcg Oral Before breakfast

## 2024-07-19 NOTE — PROGRESS NOTES
HPI:    Patient ID: Johnny Kern is a 73 year old male.    Memory Loss  The patient's primary symptoms include memory loss.           Johnny Kern is a 73 year old male who presents for follow up for probable Dementia, cognitive and behavioral changes. Has history of major depressive disorder. Last seen feb 2024 and at that time mood and behavior but was relatively stable but wife reports it has gotten worse again. Saw Dr Adele Serna behavioral [facility] and she recommended starting Escitalopram in addition to Mirtazipine. Wife reports didn't started the medication, he is concerned that will upset his stomach. He thinks he does not need any medications and memory is pretty good.   Had cognitive evaluation through his company but never completed the neuropsychology evaluation as recommended by us.  MRI brain shows right temporal lobe atrophy concerning for neurodegenerative process. Has no history of herpes encephalitis or stroke/old injury. No hallucinations or psychosis  Repeat MRI brain w and wo contrast again shows bitemporal lobe atrophy right>left.         Office visit: Feb 2024      Patient is a 73-year-old male who presented for follow-up.  He was last seen in September 2020 and patient did not follow-up back with me. Saw my colleagues Dr Guidry in June 2022.   He was seen for cognitive and behavioral changes and was apparently diagnosed with major depressive disorders and anxiety.  Wife reports he was doing good for a while and lately started getting more argumentative and stubborn,  was giving her difficult time.  Patient was not taking mirtazapine and felt he needs to see his Psychiatrist again.    Wife reports that was stressing her out and making her anxiety worse so she went to [place] and then to [place] alone and after coming back she thinks his behavior is better. She states while she was aboard he was able to take care of himself and did well on his own.   Patient feels he is doing well and  he is stable mood wise. Wife has no further concerns at this point.      Office visit: sept 2020    Patient presents for follow up along with his wife. He had MRI brain done and wife wanted to discuss the results in-person. MRI shows mild global atrophy and advanced atrophy in right temporal lobe.   He is following with Dr Stuart and on Remeron and Cyproheptadine. He developed hallucinations with Ambien and was not able to tolerate Quetiapine  Currently only taking Remeron and Cryproheptadine. No further hallucinations.      Stacey Kern is a 69 year old male with history of diabetes who presents for evaluation of mood and cognitive changes. History obtained from patient's family as patient does not feel like talking. Wife reports he was doing fine until June and suddenly there has been a change in his personality and mood, he looks sad, does not want to talk, lost interest in doing things that he use to enjoy, lost of appetite and have trouble sleeping. He also had difficulty doing his job and inability to focus. He works for [employer] and due to Covid was working from home most of the time. Symptoms seems to be associated with increased stress at his workplace and anxious because of current COVID pandemic. There is no personal stress and have a good family life. Intermittently he used to get emotional regarding the racism issue/incidents happening in [place] but never exhibited signs of depression. He is having short term memory deficits but speech and cognitive slowed down in the past 6 weeks. He was seen by his primary care and started on Ambien and low dose Lexapro last week. CT head obtained showed global parenchyma atrophy more pronounced in the right anterior temporal lobe. No family history of major depressive disorders or dementia.          HISTORY:  Past Medical History:    Chronic headaches    Type II or unspecified type diabetes mellitus without mention of complication, not stated as uncontrolled       History reviewed. No pertinent surgical history.   Family History   Problem Relation Age of Onset    Cancer Mother         esophagus    Cancer Father     Prostate Cancer Brother     Diabetes Neg     Glaucoma Neg     Macular degeneration Neg       Social History     Socioeconomic History    Marital status:    Tobacco Use    Smoking status: Former     Current packs/day: 0.00     Types: Cigarettes     Quit date: 2002     Years since quittin.5    Smokeless tobacco: Never   Vaping Use    Vaping status: Never Used   Substance and Sexual Activity    Alcohol use: No    Drug use: No    Sexual activity: Not Currently     Partners: Female   Other Topics Concern    Caffeine Concern Yes     Comment: 1 cup tea daily    Exercise No    Pt has a pacemaker No    Pt has a defibrillator No    Reaction to local anesthetic No   Social History Narrative    The patient does not use an assistive device..      The patient does live in a home with stairs.        Review of Systems   Constitutional: Negative.    HENT: Negative.     Eyes: Negative.    Respiratory: Negative.     Cardiovascular: Negative.    Gastrointestinal: Negative.    Endocrine: Negative.    Genitourinary: Negative.    Musculoskeletal: Negative.    Skin: Negative.    Allergic/Immunologic: Negative.    Neurological: Negative.    Psychiatric/Behavioral:  Positive for behavioral problems and memory loss.    All other systems reviewed and are negative.           Current Outpatient Medications   Medication Sig Dispense Refill    memantine 10 MG Oral Tab Take 1 tablet (10 mg total) by mouth every evening. 30 tablet 2    mirtazapine 15 MG Oral Tab Take 1.5 tablets (22.5 mg total) by mouth nightly.      levothyroxine 50 MCG Oral Tab Take 1 tablet (50 mcg total) by mouth before breakfast. 90 tablet 1    glipiZIDE ER 5 MG Oral Tablet 24 Hr Take 1 tablet (5 mg total) by mouth daily. 90 tablet 3    lisinopril 5 MG Oral Tab Take 1 tablet (5 mg total) by mouth daily. 90  tablet 3    metFORMIN 500 MG Oral Tab Take 1 tablet (500 mg total) by mouth 2 (two) times daily with meals. 180 tablet 3    Blood Glucose Calibration (TRUE METRIX LEVEL 1) Low In Vitro Solution 1 kit by Other route as needed. Gently swirl before use. *do not shake*  Control Tests should be performed: For practice to ensure your testing technique is good. Occasionally as you use a vial of test strips. When opening a new vial of test strips. If results seem unusually high or low. If a vial has been left opened or exposed to extreme heat or cold, or humidity. Whenever a check on performance of the system is needed. If meter damage is suspected. 3 each 3    Alcohol Swabs (BD SWAB SINGLE USE REGULAR) Does not apply Pads Apply 1 Pad topically daily. Use a directed once daily 100 each 3    Glucose Blood (TRUE METRIX BLOOD GLUCOSE TEST) In Vitro Strip 1 strip by In Vitro route daily. 100 strip 3    Blood Glucose Monitoring Suppl (TRUE METRIX METER) w/Device Does not apply Kit 1 kit daily. 1 kit 0    Lancet Device Does not apply Misc 1 Lancet by Finger stick route daily. 1 each 1    indomethacin 50 MG Oral Cap Take 1 capsule (50 mg total) by mouth 3 (three) times daily with meals. As needed for gout flare and pain 30 capsule 1    ergocalciferol 1.25 MG (88814 UT) Oral Cap Take 1 capsule (50,000 Units total) by mouth every 30 (thirty) days. 3 capsule 3    TRUEplus Lancets 30G Does not apply Misc 1 Lancet by Finger stick route daily. 100 each 3    Lancets Does not apply Misc 1 Lancet daily. 100 each 3     Allergies:  Allergies   Allergen Reactions    Penicillins UNKNOWN     PHYSICAL EXAM:   Physical Exam  Blood pressure 102/60, pulse 74, resp. rate 16, weight 140 lb (63.5 kg), SpO2 98%.  General Appearance: Well nourished, well developed, no apparent distress.   HEENT: Normocephalic and atraumatic.   Cardiovascular: Normal rate, regular rhythm and normal heart sounds.    Pulmonary/Chest: Effort normal and breath sounds normal.    Abdominal: Soft. Bowel sounds are normal.   Skin: dry, clean and intact  Ext: peripheral pulses present  Psych: normal mood and affect    Neurological:  Patient is awake, alert and oriented to person, place and time   Normal  attention/concentration, speech and language.      Cognitive assessment test- not done, patient feels anxious about it  Last MOCA- 21/30 June 2022    Cranial Nerves:   II: Visual fields: normal  III: Pupils: equal, round, reactive to light  III,IV,VI: Extra Ocular Movements: intact  V: Facial sensation: intact  VII: Facial strength: intact  VIII: Hearing: intact  IX: Palate: intact  XI: Shoulder shrug: intact  XII: Tongue movement: normal    Motor: Normal tone. Strength is  5 out of 5 in all extremities bilaterally.    Sensory: Sensory examination is normal to light touch and pinprick     Coordination: Finger-to-nose test normal    Gait: normal casual gait            TESTS/IMAGING:     MRI brain w and wo contrast                    FINDINGS:  Volume loss is noted.  The ventricles are within normal limits.  Encephalomalacia in the right temporal lobe is present..  There is no midline shift or mass effect.  The basal cisterns are patent.  The gray-white matter differentiation is intact.  The  craniocervical junction is unremarkable.       There is no acute intracranial hemorrhage or extra-axial fluid collection identified.  There is no parenchymal signal abnormality identified.  No significant abnormal parenchymal gradient susceptibility.  There is no abnormal parenchymal or  leptomeningeal enhancement.  There is no restricted diffusion to suggest acute ischemia/infarction.     Mild mucosal thickening of bilateral maxillary sinuses is noted.  The expected major intracranial flow voids are present.                      Impression   CONCLUSION:  No acute intracranial abnormality.               Imaging:  Per reports     MRI brain 7/9/2020:   Global parencymal atrophy which is most pronounced in  the R temporal lobe where there is adanced atrophy of the temporal lobe/hippocampus     CT brain (7/2020):   Global parenchymal atrophy which is more pronounced in the R anterior temporal lobe moderate to severe in degree.    ASSESSMENT/PLAN:         ICD-10-CM    1. Alzheimer's dementia with mood disturbance, unspecified dementia severity, unspecified timing of dementia onset (HCC)  G30.9     F02.83       2. Neurocognitive disorder  R41.9 PET BRAIN METABOLISM (DEMENTIA, EPILEPSY, TUMOR)(CPT=78608)      3. Brain atrophy (HCC)  G31.9 PET BRAIN METABOLISM (DEMENTIA, EPILEPSY, TUMOR)(CPT=78608)            Cognitive and behavioral changes and right temporal atrophy  MRI brain w and wo shows global brain atrophy with predominant right temporal lobe atrophy   No enhancing lesions seen.     Suspected neurodegenerative process, Alzheimer's or frontotemporal dementia.  No clinical signs of autoimmune/limbic encephalitis    - Discussed again at length with patient and her wife regarding complete neurological work up  Wife reports due to patient's behavior and lack of understanding his current condition non compliant with tests and medication    Encouraged to complete PET brain scan to differentiate Alzheimer's vs frontotemporal dementia  If normal then may need to consider spinal tap and other tests    - Start Memantine 10 mg daily    Major Depressive disorder and anxiety disorder    Had transient worsening probably due to medication non-compliance and family dynamics    Continue mirtazapine. Advise to start Escitalopram as recommended by Dr Angulo    Follow up after the above test is completed      Total 40 minutes spent with patient >50% of visit was spent in counseling and coordination of care      Sheryl Cunningham MD  [facility] [place]        Meds This Visit:  Requested Prescriptions     Signed Prescriptions Disp Refills    memantine 10 MG Oral Tab 30 tablet 2     Sig: Take 1 tablet (10 mg total) by mouth every  evening.       Imaging & Referrals:  PET BRAIN METABOLISM (DEMENTIA, EPILEPSY, TUMOR)(CPT=78608)     ID#[id number]

## 2024-07-20 NOTE — TELEPHONE ENCOUNTER
Patient's wife contacted and made aware of note completed and signed. She verbalized understanding. No further questions or concerns at this time.

## 2024-07-24 ENCOUNTER — TELEPHONE (OUTPATIENT)
Dept: INTERNAL MEDICINE CLINIC | Facility: CLINIC | Age: 74
End: 2024-07-24

## 2024-07-24 NOTE — TELEPHONE ENCOUNTER
Patient wife  calling ( name and date of birth of patient verified ) on Release of Information       Wife states patient went to Psychiatrist and was prescribed Lithium  150mg once aday for \" deep depression, anxiety\"    Wife says patient is very anxious and when the patient learned of the side effects patient refuses to take the medication     ( The alternative to the medication neil be inpatient psych )         Wife is requesting Dr. Gallagher  to call the patient and reassure him it is ok to take the new medication    Explained to wife that Dr. Gallagher s not available until 7/28.. wife will wait until that time         Best call back number: [phone]

## 2024-07-29 ENCOUNTER — TELEPHONE (OUTPATIENT)
Dept: INTERNAL MEDICINE CLINIC | Facility: CLINIC | Age: 74
End: 2024-07-29

## 2024-07-29 NOTE — TELEPHONE ENCOUNTER
Spoke to patients wife and advised it is up to the patient if he would like to get the imaging done or not. Did advised that this scan is important to determine plan of care. She states she will reach out to primary care provider regarding Metformin

## 2024-07-29 NOTE — TELEPHONE ENCOUNTER
Please call the patient.  Advise him on the days that he is not taking the metformin, he can take a second glipizide pill to make up for the metformin.  Once he is back on the full dose daily metformin, he can go back to the one a day glipizide dosing.

## 2024-07-29 NOTE — TELEPHONE ENCOUNTER
Wife calling with update, she says she no longer needs call from Aileen about this although wants PCP to be aware of a few things prior.     Patient refused to take lithium due to side effects and said \" he will die if he takes that medication\". Flat out refused to take it so Patient wife notified psych of concern for psych.    Psych MD did advise wife that patient should take Mirtazapine 30mg BID Thursday of last week instead of prior dose.       Wife is noticing difference with dose increase.  Patient is putting some effort in doing some things around the home. And he is eating, doing crossword puzzles, going to [employer] daily and seems to be doing better. Behavior is a bit better.     Patient does not want to take any other medication or see any other providers at this time.   Wife is ok with this for now    Patient does not want to take memantine 10 MG Oral Neuro gave him. Wife is ok with this and she will try again down the line if able to.   Wife reports that Per Neuro, \"memory not that bad\" and we can hold off on taking medications for memory.    Patient has PET scan scheduled, wife is concerned that once she tells him that he needs to hold the metformin prior to testing, that patient is going to refuse to get the testing done.     Wife asked to please call Dr. Cunningham's office and assess needed and urgency of this test. Patient says that he will only see Dr. Gallagher at this time, wife may need support from PCP office that ok to stop metformin and get PET scan done. Patient wife may choose to wait until 3 month follow up to discuss this more with Dr. Gallagher but she agreed to call neuro and check with them on urgency of getting this testing done.

## 2024-07-29 NOTE — TELEPHONE ENCOUNTER
Dr. Gallagher please advise, okay to hold Metformin 48 hours - any other recommendations for patient during this time    Wife and patient called with RN on speaker phone.   Patient name and date of birth verified.     Patient is scheduled for PET scan 8/5/24 for Dr. Cunningham.   They were told the patient has to hold his metformin 48 hours prior to the test.   Patient is concerned as his diabetes is very well controlled.     RN follow up - if no answer on home phone, please call wife's cell phone 2nd.

## 2024-07-29 NOTE — TELEPHONE ENCOUNTER
Informed patient and wife (MARYCARMEN) regarding DR Gallagher's note and recommendation.   Instructed to stop the metformin on 8/3/24, PET scan scheduled 8/5/24.  Take glipizide instead of 1 tablet a day he will take it  twice a day.  Then after the test, resume regular scheduling of his medications.     RN heard the wife instructed the patient multiple times regarding the instruction, and patient teach- back the instructions multiple times as well.   Patient also write down the above instructions  for his own copy multiple times.      Patient and wife understands the instructions.  Spent 20 mins..  At the last minute, patient requesting to send the instructions again via [employer] .  [employer] sent per request .       Future Appointments   Date Time Provider Department Center   8/5/2024 12:00 PM  PET DOSE RM1  [facility]   8/5/2024  1:15 PM  PET SCANNER  PET [facility]

## 2024-07-29 NOTE — TELEPHONE ENCOUNTER
Patient wife called to say her  does not want to take the scan and she would like to know how important it is for him to have it.

## 2024-07-29 NOTE — TELEPHONE ENCOUNTER
I assume that what is meant by urine test for diabetes is at the urine microalbumin test.  The patient did this test on April 26 of this year.

## 2024-07-29 NOTE — TELEPHONE ENCOUNTER
Per wife (MARYCARMEN) , patient is enrolled on his insurance  program called  \"GO 65 \"  .  Patient needs to complete the following  requirements for the incentive $25 gift card.     1.diabetic annual exam===patient competed   2. Diabetic eye exam ===patient completed  3. Urine test for diabetes ===NEEDS ORDER, per insurance  patient still needs to do it .

## 2024-07-30 NOTE — TELEPHONE ENCOUNTER
Will await for patient response.    diabetic urine test  Message [phone]  From  Zulay Mosqueda RN To  Johnny Kern Sent and Delivered  7/30/2024  8:12 AM   Last Read in [facility] [employer]  7/30/2024  9:27 AM by Johnny Kern

## 2024-08-12 ENCOUNTER — MED REC SCAN ONLY (OUTPATIENT)
Dept: INTERNAL MEDICINE CLINIC | Facility: CLINIC | Age: 74
End: 2024-08-12

## 2024-08-23 ENCOUNTER — TELEPHONE (OUTPATIENT)
Dept: INTERNAL MEDICINE CLINIC | Facility: CLINIC | Age: 74
End: 2024-08-23

## 2024-08-23 ENCOUNTER — TELEPHONE (OUTPATIENT)
Dept: NEUROLOGY | Facility: CLINIC | Age: 74
End: 2024-08-23

## 2024-08-23 DIAGNOSIS — E03.9 HYPOTHYROIDISM, UNSPECIFIED TYPE: Primary | ICD-10-CM

## 2024-08-23 NOTE — TELEPHONE ENCOUNTER
Order for TSH generated.  Get it done a couple of days before the visit.  No need for any other testing.  I would not test for any pancreas issues unless he is having abdominal symptoms.

## 2024-08-23 NOTE — TELEPHONE ENCOUNTER
Spoke with patient's wife who states patient received denial letter from insurance. Informed Sofia that once our prior authorization department receives the denial, they will send a message to Dr Cunningham with reason for denial.

## 2024-08-23 NOTE — TELEPHONE ENCOUNTER
Pts wife called asking to speak with clinical staff about getting the imaging approved through pts insurance. Please advise

## 2024-08-23 NOTE — TELEPHONE ENCOUNTER
Dr Gallagher:   Spouse called. JUST an Fyi Pet scan of brain ordered by Dr Cunningham has not been approved.     Patient spouse asking if labs are needed; patient has upcoming appointment 10/16/24 for follow up.     Do you want to repeat TSH?  Also spouse asking on your thoughts if pancreas testing, labs should be checked? She heard about this can be a correlation with dementia.      Mirtazapine 30mg did help improve appetite and attitude/mood.

## 2024-09-04 ENCOUNTER — TELEPHONE (OUTPATIENT)
Dept: INTERNAL MEDICINE CLINIC | Facility: CLINIC | Age: 74
End: 2024-09-04

## 2024-09-04 NOTE — TELEPHONE ENCOUNTER
Patient's spouse states patient is at Dr. Itz Angulo's office (psychiatrist in [place]) and they are faxing over office notes to Dr. Gallagher at [phone].  On site staff, patient's spouse would like a call either way to know if these have been received.     Spouse wanted to add to let  know this is a follow up appointment, patient doing much better.    Also, as FYI the Pet scan was not approved by insurance, that is why it was not done. This was ordered by neurology.

## 2024-09-11 NOTE — TELEPHONE ENCOUNTER
Patient and spouse informed that physicians progress note was not received in office yet. Wife states that she will contact Dr Angulo's office and have them re-fax to number provided [phone].

## 2024-10-03 ENCOUNTER — HOSPITAL ENCOUNTER (OUTPATIENT)
Dept: NUCLEAR MEDICINE | Facility: HOSPITAL | Age: 74
Discharge: HOME | End: 2024-10-03
Attending: Other
Payer: MEDICARE

## 2024-10-03 DIAGNOSIS — R41.9 NEUROCOGNITIVE DISORDER: ICD-10-CM

## 2024-10-03 DIAGNOSIS — G31.9 BRAIN ATROPHY (HCC): ICD-10-CM

## 2024-10-03 LAB — GLUCOSE BLD-MCNC: 102 MG/DL (ref 70–99)

## 2024-10-03 PROCEDURE — 78608 BRAIN IMAGING (PET): CPT | Performed by: OTHER

## 2024-10-03 PROCEDURE — 82962 GLUCOSE BLOOD TEST: CPT

## 2024-10-04 ENCOUNTER — TELEPHONE (OUTPATIENT)
Dept: NEUROLOGY | Facility: CLINIC | Age: 74
End: 2024-10-04

## 2024-10-04 NOTE — TELEPHONE ENCOUNTER
Pt wife called in, advised imaging was completed yesterday . Looking to get results. Advised to wife to allow dr Cunningham to review results to be able to advise.

## 2024-10-08 NOTE — TELEPHONE ENCOUNTER
Called patient's wife Ms Kern and discussed PET scan results-         Impression   CONCLUSION:  Diminished activity in both temporal lobes and hippocampi is consistent with Alzheimer disease.          Patient not taking Memantine due to concerns of side effect.  I explain to the patient it is important to take Memantine given above diagnosis.    He would like to discuss this with Dr Gallagher.    Wife states with higher dose of Mirtazapine had ?oral dyskinesia so now doing better once she lower the dose    Wife asked about driving and he cannot drive given the above diagnosis  He needs supervision and assistance with medications/meals  Wife is POA for decision making

## 2024-10-15 ENCOUNTER — LAB ENCOUNTER (OUTPATIENT)
Dept: LAB | Age: 74
End: 2024-10-15
Attending: INTERNAL MEDICINE
Payer: MEDICARE

## 2024-10-15 DIAGNOSIS — E03.9 HYPOTHYROIDISM, UNSPECIFIED TYPE: ICD-10-CM

## 2024-10-15 LAB — TSI SER-ACNC: 1.78 MIU/ML (ref 0.55–4.78)

## 2024-10-15 PROCEDURE — 84443 ASSAY THYROID STIM HORMONE: CPT

## 2024-10-15 PROCEDURE — 36415 COLL VENOUS BLD VENIPUNCTURE: CPT

## 2024-10-17 ENCOUNTER — TELEPHONE (OUTPATIENT)
Dept: INTERNAL MEDICINE CLINIC | Facility: CLINIC | Age: 74
End: 2024-10-17

## 2024-10-17 NOTE — TELEPHONE ENCOUNTER
Wife on the line stated forget to get the flu shot yesterday    Assisted with appointment.    Also will need to update forms for emergency contact    .  Future Appointments   Date Time Provider Department Center   10/24/2024  2:00 PM [facility]   1/22/2025  2:40 PM Sonny Gallagher MD [facility]

## 2024-10-24 ENCOUNTER — TELEPHONE (OUTPATIENT)
Dept: INTERNAL MEDICINE CLINIC | Facility: CLINIC | Age: 74
End: 2024-10-24

## 2024-10-24 ENCOUNTER — IMMUNIZATION (OUTPATIENT)
Dept: INTERNAL MEDICINE CLINIC | Facility: CLINIC | Age: 74
End: 2024-10-24
Payer: MEDICARE

## 2024-10-24 DIAGNOSIS — F45.8 TEETH GRINDING: ICD-10-CM

## 2024-10-24 DIAGNOSIS — M26.609 TMJ (TEMPOROMANDIBULAR JOINT DISORDER): Primary | ICD-10-CM

## 2024-10-24 DIAGNOSIS — Z23 NEED FOR VACCINATION: Primary | ICD-10-CM

## 2024-10-24 PROCEDURE — G0008 ADMIN INFLUENZA VIRUS VAC: HCPCS | Performed by: INTERNAL MEDICINE

## 2024-10-24 PROCEDURE — 90662 IIV NO PRSV INCREASED AG IM: CPT | Performed by: INTERNAL MEDICINE

## 2024-10-24 NOTE — TELEPHONE ENCOUNTER
Pt's wife requesting PT referral for alzheimers for jaw/ teeth grinding.     Also she needs a referral with dentist  Wendy Valadez for custom mouth guard appointment Is oct 27/24 2099 [address] [address]   Phone number# [phone]     Pts wife stts he has been taking memantine 5mg tolerating well and has memantine 10mg at home prescribed by neurologist Dr. Lina Saucedo. She is asking if after 30 days of taking 5mg can he try taking 10mg, if okay by you.

## 2024-10-30 NOTE — TELEPHONE ENCOUNTER
Patient/ spouse contacted and informed of PT referral generated by [facility], both voiced understanding. Also informed patient/wife Per [facility] ok after 30 days to continue Rx Memantine 10 mg per Neurologist Dr Mills orders and to contact office if any adverse reactions from taking 10 mg dose.

## 2024-11-13 ENCOUNTER — TELEPHONE (OUTPATIENT)
Dept: INTERNAL MEDICINE CLINIC | Facility: CLINIC | Age: 74
End: 2024-11-13

## 2024-11-13 NOTE — TELEPHONE ENCOUNTER
Patient's spouse Sofia called (on Release of Information), verified patient's Name and . States patient's physical therapy referral is still not approved yet.     Also states that she provided a copy of patient's living will when patient received his flu shot. She wants to make sure that is already in the patient's record. Chart reviewed. Document in Media.    Routed to [facility] for assistance.

## 2024-11-13 NOTE — TELEPHONE ENCOUNTER
Patient's spouse Sofia called (on Release of Information), verified patient's Name and .   States patient was placed on memantine 10 mg per Neurology. Patient tried medication for 3 days only, and spouse noted patient's blood sugar over 140's and patient grinded his teeth a lot.     Patient dose was decreased to 5 mg and seems to be tolerating this better than the 10-mg dose. Has less teeth grinding and blood sugar level less than 140. She wants to make sure Dr. Gallagher is aware of this.     Dr. Aileen ZIMMER.

## 2024-11-20 ENCOUNTER — NURSE TRIAGE (OUTPATIENT)
Dept: INTERNAL MEDICINE CLINIC | Facility: CLINIC | Age: 74
End: 2024-11-20

## 2024-11-20 ENCOUNTER — OFFICE VISIT (OUTPATIENT)
Dept: FAMILY MEDICINE CLINIC | Facility: CLINIC | Age: 74
End: 2024-11-20
Payer: MEDICARE

## 2024-11-20 VITALS
HEIGHT: 70 IN | OXYGEN SATURATION: 96 % | TEMPERATURE: 98 F | DIASTOLIC BLOOD PRESSURE: 64 MMHG | RESPIRATION RATE: 18 BRPM | WEIGHT: 146.63 LBS | SYSTOLIC BLOOD PRESSURE: 100 MMHG | HEART RATE: 68 BPM | BODY MASS INDEX: 20.99 KG/M2

## 2024-11-20 DIAGNOSIS — H00.015 HORDEOLUM EXTERNUM OF LEFT LOWER EYELID: Primary | ICD-10-CM

## 2024-11-20 DIAGNOSIS — H11.32 SUBCONJUNCTIVAL HEMATOMA, LEFT: ICD-10-CM

## 2024-11-20 PROCEDURE — 3008F BODY MASS INDEX DOCD: CPT | Performed by: NURSE PRACTITIONER

## 2024-11-20 PROCEDURE — 3078F DIAST BP <80 MM HG: CPT | Performed by: NURSE PRACTITIONER

## 2024-11-20 PROCEDURE — 99213 OFFICE O/P EST LOW 20 MIN: CPT | Performed by: NURSE PRACTITIONER

## 2024-11-20 PROCEDURE — 1159F MED LIST DOCD IN RCRD: CPT | Performed by: NURSE PRACTITIONER

## 2024-11-20 PROCEDURE — 3074F SYST BP LT 130 MM HG: CPT | Performed by: NURSE PRACTITIONER

## 2024-11-20 PROCEDURE — 1160F RVW MEDS BY RX/DR IN RCRD: CPT | Performed by: NURSE PRACTITIONER

## 2024-11-20 RX ORDER — POLYMYXIN B SULFATE AND TRIMETHOPRIM 1; 10000 MG/ML; [USP'U]/ML
1 SOLUTION OPHTHALMIC EVERY 4 HOURS
Qty: 1 EACH | Refills: 0 | Status: SHIPPED | OUTPATIENT
Start: 2024-11-20 | End: 2024-11-27

## 2024-11-20 NOTE — TELEPHONE ENCOUNTER
Wife called asking for next steps due to bump/red eye    Advised to speak with patient, stated he is in the bathroom    Will call back

## 2024-11-20 NOTE — TELEPHONE ENCOUNTER
Reason for Disposition  • Patient wants to be seen    Protocols used: Eye Pain and Other Symptoms-A-[place]

## 2024-11-20 NOTE — TELEPHONE ENCOUNTER
Action Requested: Summary for Provider     []  Critical Lab, Recommendations Needed  [x] Need Additional Advice  []   FYI    []   Need Orders  [] Need Medications Sent to Pharmacy  []  Other     SUMMARY:pt/wife called, woke up this morning , left eye red, no pain or tearing no blurry vision, feels like something in it, wife will take to  at [place] now , only late appts available at [facility]     Reason for call: Eye Problem  Onset:woke up this morning

## 2024-11-20 NOTE — PROGRESS NOTES
CHIEF COMPLAINT:     Chief Complaint   Patient presents with    Eye Problem     Pt woke up at 10am and noticed redness in the left eye. No pain but feels like something is in the eye.        HPI:   Johnny Kern is a 73 year old male who presents with chief complaint of eye irritation. Symptoms began this morning. Pt's wife is providing information, pt may be a poor historian due to Alzheimer's diagnosis. Symptoms have been stable since onset.   Patient reports left eye redness, no discharge, no itching, no eyelid/lash crusting. Pt reported the it feels like something might be in the eye to rooming MA but upon further questioning pt denies pain and foreign body sensation. Pt does not have any sensation related to eye redness.     Denies photophobia, visual disturbances, pain with movement of eye, fever, cold symptoms, or contact with irritant.  Treatments tried: none    Current Outpatient Medications   Medication Sig Dispense Refill    polymyxin B-trimethoprim 81159-2.1 UNIT/ML-% Ophthalmic Solution Place 1 drop into both eyes every 4 (four) hours for 7 days. 1 each 0    levothyroxine 50 MCG Oral Tab Take 1 tablet (50 mcg total) by mouth before breakfast. 90 tablet 3    mirtazapine 15 MG Oral Tab Take 1 tablet (15 mg total) by mouth nightly. 90 tablet 3    memantine 5 MG Oral Tab Take 1 tablet (5 mg total) by mouth every evening. 30 tablet 5    lisinopril 5 MG Oral Tab Take 1 tablet (5 mg total) by mouth daily. 90 tablet 3    glipiZIDE ER 5 MG Oral Tablet 24 Hr Take 1 tablet (5 mg total) by mouth daily. 90 tablet 3    metFORMIN 500 MG Oral Tab Take 1 tablet (500 mg total) by mouth 2 (two) times daily with meals. 180 tablet 3    Blood Glucose Calibration (TRUE METRIX LEVEL 1) Low In Vitro Solution 1 kit by Other route as needed. Gently swirl before use. *do not shake*  Control Tests should be performed: For practice to ensure your testing technique is good. Occasionally as you use a vial of test strips. When  opening a new vial of test strips. If results seem unusually high or low. If a vial has been left opened or exposed to extreme heat or cold, or humidity. Whenever a check on performance of the system is needed. If meter damage is suspected. 3 each 3    Alcohol Swabs (BD SWAB SINGLE USE REGULAR) Does not apply Pads Apply 1 Pad topically daily. Use a directed once daily 100 each 3    Blood Glucose Monitoring Suppl (TRUE METRIX METER) w/Device Does not apply Kit 1 kit daily. 1 kit 0    indomethacin 50 MG Oral Cap Take 1 capsule (50 mg total) by mouth 3 (three) times daily with meals. As needed for gout flare and pain 30 capsule 1    ergocalciferol 1.25 MG (78084 UT) Oral Cap Take 1 capsule (50,000 Units total) by mouth every 30 (thirty) days. 3 capsule 3    Glucose Blood (TRUE METRIX BLOOD GLUCOSE TEST) In Vitro Strip 1 strip by In Vitro route daily. (Patient not taking: Reported on 2024) 100 strip 3    Lancet Device Does not apply Misc 1 Lancet by Finger stick route daily. (Patient not taking: Reported on 2024) 1 each 1      Past Medical History:    Chronic headaches    Type II or unspecified type diabetes mellitus without mention of complication, not stated as uncontrolled      History reviewed. No pertinent surgical history.   Family History   Problem Relation Age of Onset    Cancer Mother         esophagus    Cancer Father     Prostate Cancer Brother     Diabetes Neg     Glaucoma Neg     Macular degeneration Neg       Social History     Socioeconomic History    Marital status:    Tobacco Use    Smoking status: Former     Current packs/day: 0.00     Types: Cigarettes     Quit date: 2002     Years since quittin.9    Smokeless tobacco: Never   Vaping Use    Vaping status: Never Used   Substance and Sexual Activity    Alcohol use: No    Drug use: No    Sexual activity: Not Currently     Partners: Female   Other Topics Concern    Caffeine Concern Yes     Comment: 1 cup tea daily    Exercise  No    Pt has a pacemaker No    Pt has a defibrillator No    Reaction to local anesthetic No   Social History Narrative    The patient does not use an assistive device..      The patient does live in a home with stairs.         REVIEW OF SYSTEMS:   GENERAL: feels well otherwise  SKIN: no rashes  EYES:  See HPI  HENT: denies ear pain, congestion, sore throat  LUNGS: denies shortness of breath or cough  CARDIOVASCULAR: denies chest pain or palpitations   GI: denies N/V/C or abdominal pain    EXAM:   /64 (BP Location: Right arm, Patient Position: Sitting, Cuff Size: adult)   Pulse 68   Temp 97.7 °F (36.5 °C) (Oral)   Resp 18   Ht 5' 10\" (1.778 m)   Wt 146 lb 9.6 oz (66.5 kg)   SpO2 96%   BMI 21.03 kg/m²   Visual Acuity                                GENERAL: well developed, well nourished,in no apparent distress  SKIN: no rashes,no suspicious lesions  EYES: PERRLA, EOMI, no conjunctiva erythematous, left subconjunctival area injected, no discharge.  HENT: atraumatic, normocephalic, TMs non injected, without bulging or fluid bilaterally. Nasal mucosa pink and non inflamed. Posterior pharynx pink without lesions.   NECK: supple, non tender  LUNGS: clear to auscultation bilaterally.   CARDIO: RRR without murmur  LYMPH: No preauricular lymphadenopathy. No cervical lymphadenopathy    ASSESSMENT AND PLAN:   Johnny Kern is a 73 year old male who presents with:    ASSESSMENT:   Encounter Diagnoses   Name Primary?    Hordeolum externum of left lower eyelid Yes    Subconjunctival hematoma, left        PLAN: Pt may have new hordeolum forming on left lower external area of eye lid. Medication as listed below.  Hygiene and comfort care as listed in patient instructions.  Advised patient to avoid touching eyes.  Stressed importance of good handwashing.  Warm compresses to affected eye prn.     Area of left eye redness appears consistent with subconjunctival hemorrhage, reassurance provided. Discussed that area may  take 2 weeks to resolve spontaneously and does not require treatment a this time. Family felt reassured and questions were answered. If area fails to resolve, encouraged to follow up with ophthalmology.      Requested Prescriptions     Signed Prescriptions Disp Refills    polymyxin B-trimethoprim 41951-9.1 UNIT/ML-% Ophthalmic Solution 1 each 0     Sig: Place 1 drop into both eyes every 4 (four) hours for 7 days.       Risks, benefits, complications and side effects of meds discussed.

## 2024-11-26 NOTE — TELEPHONE ENCOUNTER
Physical therapy referral authorized.    Future Appointments   Date Time Provider Department Center   1/8/2025  3:45 PM Renzo Wren, PT [facility] [place]   1/13/2025  1:15 PM Renzo Wren, PT [facility] [place]   1/15/2025  1:15 PM Renzo Wren, PT [facility] [place]   1/22/2025  2:40 PM [facility]

## 2025-01-06 ENCOUNTER — TELEPHONE (OUTPATIENT)
Dept: PHYSICAL THERAPY | Facility: HOSPITAL | Age: 75
End: 2025-01-06

## 2025-01-08 ENCOUNTER — OFFICE VISIT (OUTPATIENT)
Dept: PHYSICAL THERAPY | Age: 75
End: 2025-01-08
Attending: INTERNAL MEDICINE
Payer: MEDICARE

## 2025-01-08 DIAGNOSIS — M26.609 TMJ (TEMPOROMANDIBULAR JOINT DISORDER): Primary | ICD-10-CM

## 2025-01-08 DIAGNOSIS — F45.8 TEETH GRINDING: ICD-10-CM

## 2025-01-08 PROCEDURE — 97161 PT EVAL LOW COMPLEX 20 MIN: CPT

## 2025-01-08 PROCEDURE — 97110 THERAPEUTIC EXERCISES: CPT

## 2025-01-08 NOTE — PROGRESS NOTES
TEMPORAL MANDIBULAR JOINT EVALUATION:   Referring Physician: Dr. Gallagher  Diagnosis: TMJ (temporomandibular joint disorder) (M26.609)  Teeth grinding (F45.8)     Date of Service: 1/8/2025     PATIENT SUMMARY   Johnny Kern is a 74 year old y/o male who presents to therapy today with complaints of teeth grinding. Wife present to assist with completion of evaluation, stating pt has Alzheimers and unable to independently accurately answer questions. Per wife, pt symptoms have been ongoing since last July. When grinding a lot, he also also feels pain in the back of his head/neck. Grinding occurs throughout the day. Spouse has not noticed at night. Pt is able to consciously stop when made aware of grinding.  Pt saw MD who issued mouthguard, but patient refused to use. Issued mouthwash, but patient refused stating it burns. Referred to PT in attempt to address issue. Per spouse, grinding especially notable with patient sitting on the toilet, sitting on bed. Grinding presentation inconsistent day to day. Spouse verbalizes stress and anxiety with pt symptoms. Patient lives alone with wife at home. Son 10mins away.  Per wife, pt is active. Will go to prayers, do household tasks, walking, bird feeding  Pt describes pain level current 0/10, at best 0/10, at worst 7/10.   Current functional limitations include: unlimited function.   Equipment Currently Using: none  Pertaining Imaging: NA  TMD Disability Index outcome score: 5% impaired    Johnny describes prior level of function was not grinding teeth prior to July. Pt goals include decrease grinding.  Past medical history was reviewed with Johnny. Significant findings include:   Past Medical History:    Chronic headaches    Type II or unspecified type diabetes mellitus without mention of complication, not stated as uncontrolled     No past surgical history on file.      ASSESSMENT  Johnny presents to physical therapy evaluation with primary c/o teeth grinding and  occasional pain in back of head/neck. The results of the objective tests and measures show decreased c/s AROM into sidebend and rotation. Decreased segmental mobility and soft tissue restrictions at cervical region on palpation. Full mandibular mobility with crepitus noted on opening on R. BUE strength WNL. Postural deviations with fwd head/neck and rounded shoulders. Pt requires moderate cueing and repetition for proper performance of exercises. Wife available throughout sessions for assist.  Unlimited function..  Signs and symptoms are consistent with MD diagnosis. Pt and PT discussed evaluation findings, pathology, POC and HEP.  Pt voiced understanding and performs HEP correctly without reported pain. Skilled Physical Therapy is medically necessary to address the above impairments and reach functional goals.     Precautions:  None  OBJECTIVE:   Observation/Posture: fwd head, rounded shoulders    AROM:  Cervical Spine   Mobility   Flexion full                 Extension full   R Sidebend 50% dec   L Sidebend 50% dec   R Rotation 25% dec   L Rotation 25% dec     TMJ AROM Mobility Mobility (mm) End Range/Quality of mvmt   Depression 60/60 R TMJ click   R lateral deviation 12/12    L lateral deviation 12/12    Protrusion 12/12      TMJ Accessory Joint  Mobility  Right   Left    Anterior  WNL WNL   Inferior  WNL WNL   Posterior  WNL WNL   Medial   WNL WNL   Lateral  WNL WNL              MMT STRENGTH TESTING:      Left  Right Comments   GH Flexion     5/5   5/5      GH Extension  5/5   5/5      GH Abduction 5/5   5/5      GH ER 5/5   5/5      GH IR 5/5   5/5        Flexibility and Palpation:   Upper Trapezius: R dec, L dec  Levator Scap: R dec, L dec  Scalenes/SCM: R dec, L dec  Masseter: R WNL, L WNL  Temporalis: R WNL, L WNL  Lateral Pterygoid: R NT, L NT    Palpation: no TTP    Today’s Treatment and Response:   Pt education was provided on exam findings, treatment diagnosis, treatment plan, expectations, and  prognosis. Pt was also provided recommendations for posture corrections, use of mouthguard and HEP.  Patient was instructed in and issued a HEP for: supine chin tuck, seated c/s retraction with chin tuck, seated diaphragmatic breathing, seated scapular retractions    Charges: PT Eval Low Complexity, TherEx x1 (16mins      Total Timed Treatment: 16 min     Total Treatment Time: 45 min     Based on clinical rationale and outcome measures, this evaluation involved Low Complexity decision making   PLAN OF CARE:    Goals: (to be met in 4 visits)  Pt will verbalize and demo compliance with home program  Pt's wife will verbalize 50% decrease in pt grinding throughout the day  Pt will begin use of mouthguard at least 3x/day for 10mins increments    Frequency / Duration: Patient will be seen for 1 x/week or a total of 4 visits over a 90 day period. Treatment will include: Manual Therapy, Neuromuscular Re-education, Self-Care Home Management, Therapeutic Activities, Therapeutic Exercise, and Home Exercise Program instruction    Education or treatment limitation: Cognition and Alzheimers  Rehab Potential:good    Patient/Family/Caregiver (wife) was advised of these findings, precautions, and treatment options and has agreed to actively participate in planning and for this course of care.    Thank you for your referral. Please co-sign or sign and return this letter via fax as soon as possible to [phone]. If you have any questions, please contact me at Dept: [phone]    Sincerely,  Electronically signed by therapist: Renzo Wren, PT,DPT,BSPTS-C2  Physician's certification required: Yes  I certify the need for these services furnished under this plan of treatment and while under my care.    X___________________________________________________ Date____________________    Certification From: 1/8/2025  To:4/8/2025

## 2025-01-13 ENCOUNTER — APPOINTMENT (OUTPATIENT)
Dept: PHYSICAL THERAPY | Age: 75
End: 2025-01-13
Attending: INTERNAL MEDICINE
Payer: MEDICARE

## 2025-01-15 ENCOUNTER — APPOINTMENT (OUTPATIENT)
Dept: PHYSICAL THERAPY | Age: 75
End: 2025-01-15
Attending: INTERNAL MEDICINE
Payer: MEDICARE

## 2025-01-22 ENCOUNTER — OFFICE VISIT (OUTPATIENT)
Dept: INTERNAL MEDICINE CLINIC | Facility: CLINIC | Age: 75
End: 2025-01-22

## 2025-01-22 VITALS
WEIGHT: 145 LBS | DIASTOLIC BLOOD PRESSURE: 66 MMHG | SYSTOLIC BLOOD PRESSURE: 112 MMHG | TEMPERATURE: 98 F | BODY MASS INDEX: 20.76 KG/M2 | HEART RATE: 86 BPM | HEIGHT: 70 IN | RESPIRATION RATE: 18 BRPM

## 2025-01-22 DIAGNOSIS — I10 ESSENTIAL HYPERTENSION: ICD-10-CM

## 2025-01-22 DIAGNOSIS — E03.9 HYPOTHYROIDISM, UNSPECIFIED TYPE: ICD-10-CM

## 2025-01-22 DIAGNOSIS — F33.9 RECURRENT MAJOR DEPRESSIVE DISORDER, REMISSION STATUS UNSPECIFIED: ICD-10-CM

## 2025-01-22 DIAGNOSIS — E11.9 TYPE 2 DIABETES MELLITUS WITHOUT COMPLICATION, WITHOUT LONG-TERM CURRENT USE OF INSULIN (HCC): Primary | ICD-10-CM

## 2025-01-22 DIAGNOSIS — F02.80 ALZHEIMER'S DEMENTIA, UNSPECIFIED DEMENTIA SEVERITY, UNSPECIFIED TIMING OF DEMENTIA ONSET, UNSPECIFIED WHETHER BEHAVIORAL, PSYCHOTIC, OR MOOD DISTURBANCE OR ANXIETY (HCC): ICD-10-CM

## 2025-01-22 DIAGNOSIS — F45.8 TEETH GRINDING: ICD-10-CM

## 2025-01-22 DIAGNOSIS — G30.9 ALZHEIMER'S DEMENTIA, UNSPECIFIED DEMENTIA SEVERITY, UNSPECIFIED TIMING OF DEMENTIA ONSET, UNSPECIFIED WHETHER BEHAVIORAL, PSYCHOTIC, OR MOOD DISTURBANCE OR ANXIETY (HCC): ICD-10-CM

## 2025-01-22 LAB — HEMOGLOBIN A1C: 6.5 % (ref 4.3–5.6)

## 2025-01-22 PROCEDURE — 3044F HG A1C LEVEL LT 7.0%: CPT | Performed by: INTERNAL MEDICINE

## 2025-01-22 PROCEDURE — 83036 HEMOGLOBIN GLYCOSYLATED A1C: CPT | Performed by: INTERNAL MEDICINE

## 2025-01-22 PROCEDURE — 99214 OFFICE O/P EST MOD 30 MIN: CPT | Performed by: INTERNAL MEDICINE

## 2025-01-22 PROCEDURE — 1159F MED LIST DOCD IN RCRD: CPT | Performed by: INTERNAL MEDICINE

## 2025-01-22 PROCEDURE — G2211 COMPLEX E/M VISIT ADD ON: HCPCS | Performed by: INTERNAL MEDICINE

## 2025-01-22 PROCEDURE — 3074F SYST BP LT 130 MM HG: CPT | Performed by: INTERNAL MEDICINE

## 2025-01-22 PROCEDURE — 3078F DIAST BP <80 MM HG: CPT | Performed by: INTERNAL MEDICINE

## 2025-01-22 PROCEDURE — 1160F RVW MEDS BY RX/DR IN RCRD: CPT | Performed by: INTERNAL MEDICINE

## 2025-01-22 PROCEDURE — 1170F FXNL STATUS ASSESSED: CPT | Performed by: INTERNAL MEDICINE

## 2025-01-22 PROCEDURE — 3008F BODY MASS INDEX DOCD: CPT | Performed by: INTERNAL MEDICINE

## 2025-01-22 PROCEDURE — 1126F AMNT PAIN NOTED NONE PRSNT: CPT | Performed by: INTERNAL MEDICINE

## 2025-01-22 RX ORDER — CHLORHEXIDINE GLUCONATE ORAL RINSE 1.2 MG/ML
15 SOLUTION DENTAL 2 TIMES DAILY
COMMUNITY
Start: 2024-11-19

## 2025-01-22 RX ORDER — MIRTAZAPINE 15 MG/1
15 TABLET, FILM COATED ORAL NIGHTLY
Qty: 90 TABLET | Refills: 3 | Status: SHIPPED | OUTPATIENT
Start: 2025-01-22

## 2025-01-22 RX ORDER — SYRING-NEEDL,DISP,INSUL,0.3 ML 30 GX5/16"
1 SYRINGE, EMPTY DISPOSABLE MISCELLANEOUS DAILY
Qty: 1 EACH | Refills: 1 | Status: SHIPPED | OUTPATIENT
Start: 2025-01-22

## 2025-01-22 RX ORDER — MEMANTINE HYDROCHLORIDE 10 MG/1
10 TABLET ORAL EVERY EVENING
Qty: 90 TABLET | Refills: 3 | Status: SHIPPED | OUTPATIENT
Start: 2025-01-22

## 2025-01-22 RX ORDER — LISINOPRIL 5 MG/1
5 TABLET ORAL DAILY
Qty: 90 TABLET | Refills: 3 | Status: SHIPPED | OUTPATIENT
Start: 2025-01-22

## 2025-01-22 RX ORDER — CALCIUM CITRATE/VITAMIN D3 200MG-6.25
1 TABLET ORAL DAILY
Qty: 100 STRIP | Refills: 3 | Status: SHIPPED | OUTPATIENT
Start: 2025-01-22

## 2025-01-22 RX ORDER — LEVOTHYROXINE SODIUM 50 UG/1
50 TABLET ORAL
Qty: 90 TABLET | Refills: 3 | Status: SHIPPED | OUTPATIENT
Start: 2025-01-22

## 2025-01-22 NOTE — PROGRESS NOTES
HPI:    Patient ID: Johnny Kern is a 74 year old male.    HPI    Patient returns to the office today to discuss chronic medical issues as listed on the active problem list below.  Patient last seen in the office by me on October of last year.  At that time, PET scan and neurology evaluation had shown Alzheimer's disease.  Also with history of depression.  During the last visit, the following changes were made: Initiation of Namenda 5 mg a day.  Also reinforced using the mirtazapine at bedtime.  Since the last visit, the patient has seen the following doctors: Walk-in clinic and physical therapy.    Today, the patient offers the following complaints: no issues at this time from the patient. Patient does check sugar at home. It has been running 101-106 on average.  He is still grinding  the teeth. He will work on stopping it. Family is concerned about his gum issues; they want him to use mouthwash and brush teeth. He drives every afternoon to the [employer] (about 7 minutes). His wife thinks he is a safe .   Patient describes diet as good. Weight is the same.   For exercise, the patient is active when his wife reminds him.   Tobacco and alcohol use reviewed.   Current medications reviewed. He takes the memantine and mirtazapine. He sleeps well.   Health maintenance issues reviewed.    Point-of-care A1c testing done today is 6.5.    Wt Readings from Last 6 Encounters:   01/22/25 145 lb (65.8 kg)   11/20/24 146 lb 9.6 oz (66.5 kg)   10/16/24 145 lb (65.8 kg)   07/18/24 140 lb (63.5 kg)   07/15/24 139 lb 4 oz (63.2 kg)   04/26/24 139 lb 12.8 oz (63.4 kg)       Patient Active Problem List   Diagnosis    Type II diabetes mellitus (HCC)    Essential hypertension    Presbyopia    Type 2 diabetes mellitus without retinopathy (HCC)    Age-related nuclear cataract of both eyes    Floaters, right    Brain atrophy (HCC)    Benign neoplasm of skin    Calcaneal spur    Conjunctival hemorrhage    Contusion of foot     Dermatomycosis    Disease of the oral soft tissues    Disturbances in tooth eruption    Impotence of organic origin    Low back pain    Pain in joint    Pain in joint, forearm    Pain in soft tissues of limb    Plantar fascial fibromatosis    Type II diabetes mellitus with neurological manifestations (HCC)        HISTORY:  Past Medical History:    Chronic headaches    Type II or unspecified type diabetes mellitus without mention of complication, not stated as uncontrolled      History reviewed. No pertinent surgical history.   Family History   Problem Relation Age of Onset    Cancer Mother         esophagus    Cancer Father     Prostate Cancer Brother     Diabetes Neg     Glaucoma Neg     Macular degeneration Neg       Social History     Socioeconomic History    Marital status:    Tobacco Use    Smoking status: Former     Current packs/day: 0.00     Types: Cigarettes     Quit date: 2002     Years since quittin.0    Smokeless tobacco: Never   Vaping Use    Vaping status: Never Used   Substance and Sexual Activity    Alcohol use: No    Drug use: No    Sexual activity: Not Currently     Partners: Female   Other Topics Concern    Caffeine Concern Yes     Comment: 1 cup tea daily    Exercise No    Pt has a pacemaker No    Pt has a defibrillator No    Reaction to local anesthetic No   Social History Narrative    The patient does not use an assistive device..      The patient does live in a home with stairs.          Review of Systems          Current Outpatient Medications   Medication Sig Dispense Refill    levothyroxine 50 MCG Oral Tab Take 1 tablet (50 mcg total) by mouth before breakfast. 90 tablet 3    mirtazapine 15 MG Oral Tab Take 1 tablet (15 mg total) by mouth nightly. 90 tablet 3    memantine 5 MG Oral Tab Take 1 tablet (5 mg total) by mouth every evening. 30 tablet 5    lisinopril 5 MG Oral Tab Take 1 tablet (5 mg total) by mouth daily. 90 tablet 3    glipiZIDE ER 5 MG Oral Tablet 24 Hr Take 1  tablet (5 mg total) by mouth daily. 90 tablet 3    metFORMIN 500 MG Oral Tab Take 1 tablet (500 mg total) by mouth 2 (two) times daily with meals. 180 tablet 3    Blood Glucose Calibration (TRUE METRIX LEVEL 1) Low In Vitro Solution 1 kit by Other route as needed. Gently swirl before use. *do not shake*  Control Tests should be performed: For practice to ensure your testing technique is good. Occasionally as you use a vial of test strips. When opening a new vial of test strips. If results seem unusually high or low. If a vial has been left opened or exposed to extreme heat or cold, or humidity. Whenever a check on performance of the system is needed. If meter damage is suspected. 3 each 3    Alcohol Swabs (BD SWAB SINGLE USE REGULAR) Does not apply Pads Apply 1 Pad topically daily. Use a directed once daily 100 each 3    Glucose Blood (TRUE METRIX BLOOD GLUCOSE TEST) In Vitro Strip 1 strip by In Vitro route daily. 100 strip 3    Blood Glucose Monitoring Suppl (TRUE METRIX METER) w/Device Does not apply Kit 1 kit daily. 1 kit 0    Lancet Device Does not apply Misc 1 Lancet by Finger stick route daily. 1 each 1    indomethacin 50 MG Oral Cap Take 1 capsule (50 mg total) by mouth 3 (three) times daily with meals. As needed for gout flare and pain 30 capsule 1    ergocalciferol 1.25 MG (80469 UT) Oral Cap Take 1 capsule (50,000 Units total) by mouth every 30 (thirty) days. 3 capsule 3    chlorhexidine gluconate 0.12 % Mouth/Throat Solution Use as directed 15 mL in the mouth or throat 2 (two) times daily. (Patient not taking: Reported on 1/22/2025)       Allergies:Allergies[1]     PHYSICAL EXAM:   /66 (BP Location: Left arm, Patient Position: Sitting, Cuff Size: large)   Pulse 86   Temp 98.4 °F (36.9 °C) (Other)   Resp 18   Ht 5' 10\" (1.778 m)   Wt 145 lb (65.8 kg)   BMI 20.81 kg/m²      Physical Exam  Constitutional:       Appearance: Normal appearance. He is well-developed.   HENT:      Nose: Nose normal.       Mouth/Throat:      Pharynx: No oropharyngeal exudate or posterior oropharyngeal erythema.   Eyes:      Conjunctiva/sclera: Conjunctivae normal.   Neck:      Vascular: No carotid bruit.   Cardiovascular:      Rate and Rhythm: Normal rate and regular rhythm.      Pulses: Normal pulses.      Heart sounds: Normal heart sounds. No murmur heard.  Pulmonary:      Effort: Pulmonary effort is normal.      Breath sounds: Normal breath sounds. No wheezing or rales.   Abdominal:      General: Bowel sounds are normal.      Palpations: Abdomen is soft. There is no mass.      Tenderness: There is no abdominal tenderness.   Musculoskeletal:      Right lower leg: No edema.      Left lower leg: No edema.   Lymphadenopathy:      Cervical: No cervical adenopathy.   Skin:     General: Skin is warm and dry.      Findings: No rash.   Neurological:      General: No focal deficit present.      Mental Status: He is alert.   Psychiatric:         Mood and Affect: Mood normal.         Behavior: Behavior normal.      Comments: Decreased memory                 ASSESSMENT/PLAN:   1. Type 2 diabetes mellitus without complication, without long-term current use of insulin (HCC)  A1c is 6.5.  Medications reviewed in detail.  Overall diabetes is well-controlled.  Continue current regimen.  Work on diet and exercise as tolerated.  - POC Hemoglobin A1C    2. Essential hypertension  Blood pressure is well-controlled.  Continue current treatment.    3. Hypothyroidism, unspecified type  Stable.  Continue current treatment.    4. Alzheimer's dementia, unspecified dementia severity, unspecified timing of dementia onset, unspecified whether behavioral, psychotic, or mood disturbance or anxiety (HCC)  Tolerating the Namenda reasonably well.  We will increase the dose from 5 up to 10 mg a day.  Patient reassured that this is standard dosing.  He should do well with it.  Also spent time discussing with patient and wife the issue of driving.  Patient drives  short distances on a very consistent route every day to [place] for daily prayers.  Wife is certain that he is safe and driving.  Discussed various concerns.    5. Teeth grinding  Still present.  It bothers the wife.  Patient is going to work on it.    6. Recurrent major depressive disorder, remission status unspecified (HCC)  Stable.  Continue current treatment including the mirtazapine at night.         Meds This Visit:  Requested Prescriptions      No prescriptions requested or ordered in this encounter       Imaging & Referrals:  None         Sonny Gallagher MD          [1]   Allergies  Allergen Reactions    Penicillins UNKNOWN

## 2025-02-12 ENCOUNTER — APPOINTMENT (OUTPATIENT)
Dept: PHYSICAL THERAPY | Age: 75
End: 2025-02-12
Attending: INTERNAL MEDICINE
Payer: MEDICARE

## 2025-02-25 ENCOUNTER — TELEPHONE (OUTPATIENT)
Dept: INTERNAL MEDICINE CLINIC | Facility: CLINIC | Age: 75
End: 2025-02-25

## 2025-02-25 NOTE — TELEPHONE ENCOUNTER
Patient wife calling ( name and date of birth of patient verified ) she has Covid and wants patient to be tested     Advised to get tested for Covid  can [facility]  OR  get a home test for Covid     Patient wife verbalizes understanding and agrees with plan.

## 2025-03-17 ENCOUNTER — TELEPHONE (OUTPATIENT)
Dept: NEUROLOGY | Facility: CLINIC | Age: 75
End: 2025-03-17

## 2025-03-17 NOTE — TELEPHONE ENCOUNTER
Wife asking if patient can stop mirtazapine and take Lexapro instead. She is worried mirtazapine is causing the bruxism and is worried he will become depressed unless he is put on anti-depressant. Previously on 5mg.     Did not  Mirtazapine 7.5mg.

## 2025-03-17 NOTE — TELEPHONE ENCOUNTER
Patients spouse stated needs to discuss medications.    Please call [facility] either cell or home phone to discuss and advise.

## 2025-03-18 NOTE — TELEPHONE ENCOUNTER
Patient's wife contacting the office today requesting follow up from the message that was sent yesterday, 3/17/25. Please advise.

## 2025-03-20 RX ORDER — ESCITALOPRAM OXALATE 5 MG/1
5 TABLET ORAL DAILY
Qty: 30 TABLET | Refills: 2 | Status: SHIPPED | OUTPATIENT
Start: 2025-03-20

## 2025-03-20 NOTE — TELEPHONE ENCOUNTER
Patient's wife contacting office to get an answer regarding possibly switching medication.     Please advise.

## 2025-04-13 DIAGNOSIS — F02.83 ALZHEIMER'S DEMENTIA WITH MOOD DISTURBANCE, UNSPECIFIED DEMENTIA SEVERITY, UNSPECIFIED TIMING OF DEMENTIA ONSET (HCC): ICD-10-CM

## 2025-04-13 DIAGNOSIS — G30.9 ALZHEIMER'S DEMENTIA WITH MOOD DISTURBANCE, UNSPECIFIED DEMENTIA SEVERITY, UNSPECIFIED TIMING OF DEMENTIA ONSET (HCC): ICD-10-CM

## 2025-04-14 RX ORDER — ALPRAZOLAM 0.25 MG
0.25 TABLET ORAL 2 TIMES DAILY PRN
Qty: 30 TABLET | Refills: 0 | Status: SHIPPED | OUTPATIENT
Start: 2025-04-14

## 2025-04-14 NOTE — TELEPHONE ENCOUNTER
Medication: ALPRAZolam 0.25 MG Oral Tab      Date of last refill: 03/13/2025 (#30/0)  [place]     Last office visit: 03/13/2025  Due back to clinic per last office note:  Around 06/13/2025  Date next office visit scheduled:    Future Appointments   Date Time Provider Department Center   5/12/2025  3:00 PM Sonny Gallagher MD [facility]           Last OV note recommendation:    ASSESSMENT/PLAN:          ICD-10-CM     1. Alzheimer's dementia with mood disturbance, unspecified dementia severity, unspecified timing of dementia onset (HCC)  G30.9 mirtazapine 7.5 MG Oral Tab     F02.83 ALPRAZolam 0.25 MG Oral Tab       2. Cognitive and behavioral changes  R41.89       R46.89         3. Bruxism  F45.8                      Alzheimer's Dementia with mood and behavioral disturbance, moderate to severe   MRI brain w and wo shows global brain atrophy with predominant right temporal lobe atrophy   PET brain scan shows diminished activity in both temporal lobe and hippocampi consistent with Alzheimer's disease     Continue Memantine 10 mg daily (encourage compliance)      2. Major Depressive disorder and anxiety disorder  Had transient worsening probably due to medication non-compliance and family dynamics  Continue mirtazapine but reduce dose to 7.5 mg nightly as it can worsen bruxism  Not taking Escitalopram as recommended by Dr Angulo. Advise to follow with Dr Angulo     3. Bruxism, awake only. No sleep Bruxism reported  Start low dose Xanax 0.25 mg BID prn for anxiety/agitation  Use mouthguard regularly. Follow with your Dentist         Total 40 minutes spent with patient >50% of visit was spent in counseling and coordination of care

## 2025-05-09 ENCOUNTER — TELEPHONE (OUTPATIENT)
Dept: INTERNAL MEDICINE CLINIC | Facility: CLINIC | Age: 75
End: 2025-05-09

## 2025-05-09 DIAGNOSIS — E55.9 VITAMIN D DEFICIENCY: ICD-10-CM

## 2025-05-09 DIAGNOSIS — I10 ESSENTIAL HYPERTENSION: ICD-10-CM

## 2025-05-09 DIAGNOSIS — D64.9 ANEMIA, UNSPECIFIED TYPE: ICD-10-CM

## 2025-05-09 DIAGNOSIS — E03.9 HYPOTHYROIDISM, UNSPECIFIED TYPE: ICD-10-CM

## 2025-05-09 DIAGNOSIS — E11.9 TYPE 2 DIABETES MELLITUS WITHOUT COMPLICATION, WITHOUT LONG-TERM CURRENT USE OF INSULIN (HCC): Primary | ICD-10-CM

## 2025-05-09 NOTE — TELEPHONE ENCOUNTER
Wife (on release of information) calling for Patient. Inquiring if lab orders have been placed. Patient has medicare annual appointment 5/12/25. Patient would like to do labs today.  Chart reviewed. Wife informed labs orders are not in the system.  Wife stated she had asked the nurse at last office visit to request the order.  Wife informed will send a message to Dr. Gallagher. Wife aware Dr. Gallagher is not in the office today, and back tomorrow.  Wife asking to send request to covering provider. They would like for Patient to do this weekend.  Wife provided with lab hours and location that are open Sat/Sun.    Please advise. Routed to pod mate  Labs pended.

## 2025-05-09 NOTE — TELEPHONE ENCOUNTER
Spouse forgot to add the Vitamin D, Vitamin B-12, and iron levels to current pended orders. Lab orders pended.

## 2025-05-10 ENCOUNTER — LAB ENCOUNTER (OUTPATIENT)
Dept: LAB | Facility: HOSPITAL | Age: 75
End: 2025-05-10
Attending: INTERNAL MEDICINE
Payer: MEDICARE

## 2025-05-10 DIAGNOSIS — I10 ESSENTIAL HYPERTENSION: ICD-10-CM

## 2025-05-10 DIAGNOSIS — E03.9 HYPOTHYROIDISM, UNSPECIFIED TYPE: ICD-10-CM

## 2025-05-10 DIAGNOSIS — E11.9 TYPE 2 DIABETES MELLITUS WITHOUT COMPLICATION, WITHOUT LONG-TERM CURRENT USE OF INSULIN (HCC): ICD-10-CM

## 2025-05-10 DIAGNOSIS — I10 HYPERTENSION, UNSPECIFIED TYPE: ICD-10-CM

## 2025-05-10 DIAGNOSIS — D64.9 ANEMIA, UNSPECIFIED TYPE: ICD-10-CM

## 2025-05-10 DIAGNOSIS — E55.9 VITAMIN D DEFICIENCY: ICD-10-CM

## 2025-05-10 LAB
ALBUMIN SERPL-MCNC: 4.7 G/DL (ref 3.2–4.8)
ALBUMIN/GLOB SERPL: 2 {RATIO} (ref 1–2)
ALP LIVER SERPL-CCNC: 59 U/L (ref 45–117)
ALT SERPL-CCNC: 16 U/L (ref 10–49)
ANION GAP SERPL CALC-SCNC: 5 MMOL/L (ref 0–18)
AST SERPL-CCNC: 22 U/L (ref ?–34)
BILIRUB SERPL-MCNC: 0.7 MG/DL (ref 0.2–1.1)
BUN BLD-MCNC: 18 MG/DL (ref 9–23)
CALCIUM BLD-MCNC: 9.3 MG/DL (ref 8.7–10.6)
CHLORIDE SERPL-SCNC: 104 MMOL/L (ref 98–112)
CHOLEST SERPL-MCNC: 168 MG/DL (ref ?–200)
CO2 SERPL-SCNC: 30 MMOL/L (ref 21–32)
CREAT BLD-MCNC: 0.93 MG/DL (ref 0.7–1.3)
CREAT UR-SCNC: 84.3 MG/DL
DEPRECATED HBV CORE AB SER IA-ACNC: 40 NG/ML (ref 50–336)
EGFRCR SERPLBLD CKD-EPI 2021: 86 ML/MIN/1.73M2 (ref 60–?)
EST. AVERAGE GLUCOSE BLD GHB EST-MCNC: 134 MG/DL (ref 68–126)
FASTING PATIENT LIPID ANSWER: YES
FASTING STATUS PATIENT QL REPORTED: YES
GLOBULIN PLAS-MCNC: 2.4 G/DL (ref 2–3.5)
GLUCOSE BLD-MCNC: 134 MG/DL (ref 70–99)
HBA1C MFR BLD: 6.3 % (ref ?–5.7)
HDLC SERPL-MCNC: 72 MG/DL (ref 40–59)
IRON SATN MFR SERPL: 25 % (ref 20–50)
IRON SERPL-MCNC: 84 UG/DL (ref 65–175)
LDLC SERPL CALC-MCNC: 84 MG/DL (ref ?–100)
MICROALBUMIN UR-MCNC: <0.3 MG/DL
NONHDLC SERPL-MCNC: 96 MG/DL (ref ?–130)
OSMOLALITY SERPL CALC.SUM OF ELEC: 292 MOSM/KG (ref 275–295)
POTASSIUM SERPL-SCNC: 4.2 MMOL/L (ref 3.5–5.1)
PROT SERPL-MCNC: 7.1 G/DL (ref 5.7–8.2)
SODIUM SERPL-SCNC: 139 MMOL/L (ref 136–145)
TOTAL IRON BINDING CAPACITY: 341 UG/DL (ref 250–425)
TRANSFERRIN SERPL-MCNC: 285 MG/DL (ref 215–365)
TRIGL SERPL-MCNC: 59 MG/DL (ref 30–149)
TSI SER-ACNC: 2.92 UIU/ML (ref 0.55–4.78)
VIT B12 SERPL-MCNC: 593 PG/ML (ref 211–911)
VIT D+METAB SERPL-MCNC: 45.5 NG/ML (ref 30–100)
VLDLC SERPL CALC-MCNC: 9 MG/DL (ref 0–30)

## 2025-05-10 PROCEDURE — 84443 ASSAY THYROID STIM HORMONE: CPT

## 2025-05-10 PROCEDURE — 80061 LIPID PANEL: CPT

## 2025-05-10 PROCEDURE — 83036 HEMOGLOBIN GLYCOSYLATED A1C: CPT

## 2025-05-10 PROCEDURE — 85025 COMPLETE CBC W/AUTO DIFF WBC: CPT

## 2025-05-10 PROCEDURE — 82570 ASSAY OF URINE CREATININE: CPT

## 2025-05-10 PROCEDURE — 82043 UR ALBUMIN QUANTITATIVE: CPT

## 2025-05-10 PROCEDURE — 82607 VITAMIN B-12: CPT

## 2025-05-10 PROCEDURE — 83550 IRON BINDING TEST: CPT

## 2025-05-10 PROCEDURE — 82306 VITAMIN D 25 HYDROXY: CPT

## 2025-05-10 PROCEDURE — 83540 ASSAY OF IRON: CPT

## 2025-05-10 PROCEDURE — 36415 COLL VENOUS BLD VENIPUNCTURE: CPT

## 2025-05-10 PROCEDURE — 82728 ASSAY OF FERRITIN: CPT

## 2025-05-10 PROCEDURE — 80053 COMPREHEN METABOLIC PANEL: CPT

## 2025-05-11 LAB
BASOPHILS # BLD AUTO: 0.03 X10(3) UL (ref 0–0.2)
BASOPHILS NFR BLD AUTO: 0.4 %
EOSINOPHIL # BLD AUTO: 0.19 X10(3) UL (ref 0–0.7)
EOSINOPHIL NFR BLD AUTO: 2.6 %
ERYTHROCYTE [DISTWIDTH] IN BLOOD BY AUTOMATED COUNT: 14.3 %
HCT VFR BLD AUTO: 37.4 % (ref 39–53)
HGB BLD-MCNC: 12.8 G/DL (ref 13–17.5)
IMM GRANULOCYTES # BLD AUTO: 0.01 X10(3) UL (ref 0–1)
IMM GRANULOCYTES NFR BLD: 0.1 %
LYMPHOCYTES # BLD AUTO: 2.12 X10(3) UL (ref 1–4)
LYMPHOCYTES NFR BLD AUTO: 28.9 %
MCH RBC QN AUTO: 32.7 PG (ref 26–34)
MCHC RBC AUTO-ENTMCNC: 34.2 G/DL (ref 31–37)
MCV RBC AUTO: 95.4 FL (ref 80–100)
MONOCYTES # BLD AUTO: 0.57 X10(3) UL (ref 0.1–1)
MONOCYTES NFR BLD AUTO: 7.8 %
NEUTROPHILS # BLD AUTO: 4.42 X10 (3) UL (ref 1.5–7.7)
NEUTROPHILS # BLD AUTO: 4.42 X10(3) UL (ref 1.5–7.7)
NEUTROPHILS NFR BLD AUTO: 60.2 %
PLATELET # BLD AUTO: 223 10(3)UL (ref 150–450)
RBC # BLD AUTO: 3.92 X10(6)UL (ref 3.8–5.8)
WBC # BLD AUTO: 7.3 X10(3) UL (ref 4–11)

## 2025-05-12 ENCOUNTER — OFFICE VISIT (OUTPATIENT)
Dept: INTERNAL MEDICINE CLINIC | Facility: CLINIC | Age: 75
End: 2025-05-12

## 2025-05-12 ENCOUNTER — LAB ENCOUNTER (OUTPATIENT)
Dept: LAB | Age: 75
End: 2025-05-12
Attending: INTERNAL MEDICINE
Payer: MEDICARE

## 2025-05-12 VITALS
DIASTOLIC BLOOD PRESSURE: 56 MMHG | OXYGEN SATURATION: 97 % | HEART RATE: 80 BPM | SYSTOLIC BLOOD PRESSURE: 102 MMHG | RESPIRATION RATE: 20 BRPM | WEIGHT: 142 LBS | TEMPERATURE: 97 F | BODY MASS INDEX: 20.33 KG/M2 | HEIGHT: 70 IN

## 2025-05-12 DIAGNOSIS — E55.9 VITAMIN D DEFICIENCY: ICD-10-CM

## 2025-05-12 DIAGNOSIS — Z00.00 ENCOUNTER FOR ANNUAL HEALTH EXAMINATION: Primary | ICD-10-CM

## 2025-05-12 DIAGNOSIS — G30.9 ALZHEIMER'S DEMENTIA, UNSPECIFIED DEMENTIA SEVERITY, UNSPECIFIED TIMING OF DEMENTIA ONSET, UNSPECIFIED WHETHER BEHAVIORAL, PSYCHOTIC, OR MOOD DISTURBANCE OR ANXIETY (HCC): ICD-10-CM

## 2025-05-12 DIAGNOSIS — F45.8 TEETH GRINDING: ICD-10-CM

## 2025-05-12 DIAGNOSIS — Z12.5 SCREENING FOR PROSTATE CANCER: ICD-10-CM

## 2025-05-12 DIAGNOSIS — F33.9 RECURRENT MAJOR DEPRESSIVE DISORDER, REMISSION STATUS UNSPECIFIED: ICD-10-CM

## 2025-05-12 DIAGNOSIS — Z01.00 DIABETIC EYE EXAM (HCC): ICD-10-CM

## 2025-05-12 DIAGNOSIS — E11.9 TYPE 2 DIABETES MELLITUS WITHOUT COMPLICATION, WITHOUT LONG-TERM CURRENT USE OF INSULIN (HCC): ICD-10-CM

## 2025-05-12 DIAGNOSIS — E11.9 DIABETIC EYE EXAM (HCC): ICD-10-CM

## 2025-05-12 DIAGNOSIS — I10 ESSENTIAL HYPERTENSION: ICD-10-CM

## 2025-05-12 DIAGNOSIS — F02.80 ALZHEIMER'S DEMENTIA, UNSPECIFIED DEMENTIA SEVERITY, UNSPECIFIED TIMING OF DEMENTIA ONSET, UNSPECIFIED WHETHER BEHAVIORAL, PSYCHOTIC, OR MOOD DISTURBANCE OR ANXIETY (HCC): ICD-10-CM

## 2025-05-12 DIAGNOSIS — E03.9 HYPOTHYROIDISM, UNSPECIFIED TYPE: ICD-10-CM

## 2025-05-12 LAB — COMPLEXED PSA SERPL-MCNC: 3.14 NG/ML (ref ?–4)

## 2025-05-12 PROCEDURE — G0439 PPPS, SUBSEQ VISIT: HCPCS | Performed by: INTERNAL MEDICINE

## 2025-05-12 PROCEDURE — 1160F RVW MEDS BY RX/DR IN RCRD: CPT | Performed by: INTERNAL MEDICINE

## 2025-05-12 PROCEDURE — 3044F HG A1C LEVEL LT 7.0%: CPT | Performed by: INTERNAL MEDICINE

## 2025-05-12 PROCEDURE — 1170F FXNL STATUS ASSESSED: CPT | Performed by: INTERNAL MEDICINE

## 2025-05-12 PROCEDURE — 3008F BODY MASS INDEX DOCD: CPT | Performed by: INTERNAL MEDICINE

## 2025-05-12 PROCEDURE — 3074F SYST BP LT 130 MM HG: CPT | Performed by: INTERNAL MEDICINE

## 2025-05-12 PROCEDURE — 1159F MED LIST DOCD IN RCRD: CPT | Performed by: INTERNAL MEDICINE

## 2025-05-12 PROCEDURE — 3078F DIAST BP <80 MM HG: CPT | Performed by: INTERNAL MEDICINE

## 2025-05-12 PROCEDURE — 96160 PT-FOCUSED HLTH RISK ASSMT: CPT | Performed by: INTERNAL MEDICINE

## 2025-05-12 PROCEDURE — 36415 COLL VENOUS BLD VENIPUNCTURE: CPT

## 2025-05-12 PROCEDURE — 3061F NEG MICROALBUMINURIA REV: CPT | Performed by: INTERNAL MEDICINE

## 2025-05-12 PROCEDURE — 1125F AMNT PAIN NOTED PAIN PRSNT: CPT | Performed by: INTERNAL MEDICINE

## 2025-05-12 RX ORDER — METFORMIN HYDROCHLORIDE 500 MG/1
500 TABLET, EXTENDED RELEASE ORAL 2 TIMES DAILY WITH MEALS
Qty: 180 TABLET | Refills: 1 | Status: SHIPPED | OUTPATIENT
Start: 2025-05-12

## 2025-05-12 RX ORDER — ESCITALOPRAM OXALATE 5 MG/1
5 TABLET ORAL DAILY
COMMUNITY

## 2025-05-12 NOTE — PROGRESS NOTES
Subjective:   Johnny Kern is a 74 year old male who presents for a MA AHA (Medicare Advantage Annual Health Assessment) and Subsequent Annual Wellness visit (Pt already had Initial Annual Wellness) and scheduled follow up of multiple significant but stable problems.   History of Present Illness    Patient is here requesting Medicare annual wellness visit and follow-up on chronic medical issues as listed below.  Last seen in the office on January 22.  That time A1c was 6.5.  We increased the Namenda from 5 up to 10 mg a day to better treat the Alzheimer's dementia.  Since that time he has seen neurology.  They decreased the dose of mirtazapine from 15 down to 7.5 mg nightly.  This was to decrease bruxism as a possible side effect. The mirtazapine was stopped all together.   Neurology also started him on 0.25 mg of Xanax taken once a day as needed and Lexapro 5 mg.  Current medications reviewed.  Health maintenance and vaccination status reviewed.  Advanced directives reviewed.    Full blood work done on May 10.  A1c 6.3.  No microalbumin in the urine.  Essentially normal CMP, lipid profile, TSH, B12, vitamin D.  Normal iron level.  Ferritin was low at 46.  CBC showed chronic mild anemia with hemoglobin of 12.8.    No major issues at this time. Not check BP at home. Patient does check sugar at home. It has been running around 100-120. Diet is healthy dinner. Wife is concerned about serving size. ALso with one to two Ensure a day. He has frequent BMs. For exercise, he walks for exercise. He still goes to [facility]; wife feels he is still ok to drive. Wife states that he coughs during the night. No definite coughing with eating. Wife feels he is doing well with the Xanax. His bruxism has returned. Memory is not good. Sometimes he does not recognize his family members. He is having some paranoia about death and other things.       History/Other:   Fall Risk Assessment:   He has been screened for Falls and is low  risk.      Cognitive Assessment:   Abnormal  What day of the week is this?: Correct  What month is it?: Incorrect  What year is it?: Correct  Recall \"Ball\": Correct  Recall \"Flag\": Correct  Recall \"Tree\": Correct    Functional Ability/Status:   Johnny Kern has some abnormal functions as listed below:  He has Dressing and/or Bathing issues based on screening of functional status.  Difficulty dressing or bathing?: Yes  Bathing or Showering: Able without help  Dressing: Able without help  He has Meal Preparation difficulties based on screening of functional status.He has difficulties Managing Money/Bills based on screening of functional status.He has difficulties Shopping for Groceries based on screening of functional status. He has difficulties Taking Meds as Rx'd based on screening of functional status. He has Hearing problems based on screening of functional status.He has Walking problems based on screening of functional status. He has problems with Daily Activities based on screening of functional status. He has problems with Memory based on screening of functional status.       Depression Screening (PHQ):  PHQ-2 SCORE: 0  , done 5/12/2025   If you checked off any problems, how difficult have these problems made it for you to do your work, take care of things at home, or get along with other people?: Not difficult at all              Advanced Directives:   He has a Living Will on file in [facility]; reviewed and discussed documents with patient (and family/surrogate if present).  He does NOT have a Power of  for Health Care. [Do you have a healthcare power of ?: No]  Discussed Advance Care Planning with patient (and family/surrogate if present). Standard forms made available to patient in After Visit Summary.      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    Current Medications:  Outpatient Medications Marked as Taking for the 5/12/25 encounter (Office Visit) with Sonny Gallagher MD   Medication Sig    escitalopram 5 MG Oral Tab Take 1 tablet (5 mg total) by mouth daily.    metFORMIN  MG Oral Tablet 24 Hr Take 1 tablet (500 mg total) by mouth 2 (two) times daily with meals.    [DISCONTINUED] ALPRAZolam 0.25 MG Oral Tab Take 1 tablet (0.25 mg total) by mouth 2 (two) times daily as needed.    Glucose Blood (TRUE METRIX BLOOD GLUCOSE TEST) In Vitro Strip 1 strip by In Vitro route daily.    Lancet Device Does not apply Misc 1 Lancet by Finger stick route daily.    levothyroxine 50 MCG Oral Tab Take 1 tablet (50 mcg total) by mouth before breakfast.    lisinopril 5 MG Oral Tab Take 1 tablet (5 mg total) by mouth daily.    memantine 10 MG Oral Tab Take 1 tablet (10 mg total) by mouth every evening.    glipiZIDE ER 5 MG Oral Tablet 24 Hr Take 1 tablet (5 mg total) by mouth daily.    Blood Glucose Calibration (TRUE METRIX LEVEL 1) Low In Vitro Solution 1 kit by Other route as needed. Gently swirl before use. *do not shake*  Control Tests should be performed: For practice to ensure your testing technique is good. Occasionally as you use a vial of test strips. When opening a new vial of test strips. If results seem unusually high or low. If a vial has been left opened or exposed to extreme heat or cold, or humidity. Whenever a check on performance of  the system is needed. If meter damage is suspected.    Alcohol Swabs (BD SWAB SINGLE USE REGULAR) Does not apply Pads Apply 1 Pad topically daily. Use a directed once daily    Blood Glucose Monitoring Suppl (TRUE METRIX METER) w/Device Does not apply Kit 1 kit daily.    indomethacin 50 MG Oral Cap Take 1 capsule (50 mg total) by mouth 3 (three) times daily with meals. As needed for gout flare and pain    ergocalciferol 1.25 MG (86679 UT) Oral Cap Take 1 capsule (50,000 Units total) by mouth every 30 (thirty) days.       Medical History:  He  has a past medical history of Chronic headaches and Type II or unspecified type diabetes mellitus without mention of complication, not stated as uncontrolled.  Surgical History:  He  has no past surgical history on file.   Family History:  His family history includes Cancer in his father and mother; Prostate Cancer in his brother.  Social History:  He  reports that he quit smoking about 23 years ago. His smoking use included cigarettes. He has never used smokeless tobacco. He reports that he does not drink alcohol and does not use drugs.    Tobacco:  He smoked tobacco in the past but quit greater than 12 months ago.  Tobacco Use[1]     CAGE Alcohol Screen:   CAGE screening score of 0 on 5/12/2025, showing low risk of alcohol abuse.      Patient Care Team:  Sonny Gallagher MD as PCP - General (Internal Medicine)  Sheryl Cunningham MD as Consulting Physician (NEUROLOGY)  Renzo Wren PT as Physical Therapist (Physical Therapy)    Review of Systems   Constitutional:  Negative for chills and fever.   Respiratory:  Negative for shortness of breath.    Cardiovascular:  Negative for chest pain.   Gastrointestinal:  Negative for abdominal pain, diarrhea and nausea.          Objective:   Physical Exam  Constitutional:       Appearance: Normal appearance. He is well-developed.   HENT:      Right Ear: Tympanic membrane and ear canal normal.      Left Ear: Tympanic membrane and ear  canal normal.      Nose: Nose normal.      Mouth/Throat:      Pharynx: No oropharyngeal exudate or posterior oropharyngeal erythema.   Eyes:      Conjunctiva/sclera: Conjunctivae normal.      Pupils: Pupils are equal, round, and reactive to light.   Neck:      Thyroid: No thyromegaly.      Vascular: No carotid bruit.   Cardiovascular:      Rate and Rhythm: Normal rate and regular rhythm.      Pulses: Normal pulses.      Heart sounds: Normal heart sounds. No murmur heard.  Pulmonary:      Effort: Pulmonary effort is normal.      Breath sounds: Normal breath sounds. No wheezing or rales.   Abdominal:      General: Bowel sounds are normal.      Palpations: Abdomen is soft. There is no mass.      Tenderness: There is no abdominal tenderness.   Musculoskeletal:      Right lower leg: No edema.      Left lower leg: No edema.   Lymphadenopathy:      Cervical: No cervical adenopathy.   Skin:     General: Skin is warm and dry.      Findings: No rash.   Neurological:      General: No focal deficit present.      Mental Status: He is alert.      Cranial Nerves: No cranial nerve deficit.      Coordination: Coordination normal.   Psychiatric:         Mood and Affect: Mood normal.         Behavior: Behavior normal.      Comments: Decreased memory and concentration.  Frequently defers to wife to answer questions     Bilateral barefoot skin diabetic exam is normal, visualized feet and the appearance is normal.  Bilateral monofilament/sensation of both feet is normal.  Pulsation pedal pulse exam of both lower legs/feet is normal as well.        /56 (BP Location: Left arm, Patient Position: Sitting, Cuff Size: adult)   Pulse 80   Temp 97.2 °F (36.2 °C) (Temporal)   Resp 20   Ht 5' 10\" (1.778 m)   Wt 142 lb (64.4 kg)   SpO2 97%   BMI 20.37 kg/m²  Estimated body mass index is 20.37 kg/m² as calculated from the following:    Height as of this encounter: 5' 10\" (1.778 m).    Weight as of this encounter: 142 lb (64.4  kg).    Medicare Hearing Assessment:   Hearing Screening    Screening Method: Questionnaire  I have a problem hearing over the telephone: Sometimes I have trouble following the conversations when two or more people are talking at the same time: Yes   I have trouble understanding things on the TV: Yes I have to strain to understand conversations: Yes   I have to worry about missing the telephone ring or doorbell: No I have trouble hearing conversations in a noisy background such as a crowded room or restaurant: Yes   I get confused about where sounds come from: Yes I misunderstand some words in a sentence and need to ask people to repeat themselves: Yes   I especially have trouble understanding the speech of women and children: Sometimes I have trouble understanding the speaker in a large room such as at a meeting or place of Alevism: Yes   Many people I talk to seem to mumble (or don't speak clearly): No People get annoyed because I misunderstand what they say: Yes   I misunderstand what others are saying and make inappropriate responses: Yes I avoid social activities because I cannot hear well and fear I will reply improperly: Yes   Family members and friends have told me they think I may have hearing loss: Sometimes             Visual Acuity:   Right Eye Visual Acuity: Corrected Right Eye Chart Acuity: 20/25   Left Eye Visual Acuity: Corrected Left Eye Chart Acuity: 20/25   Both Eyes Visual Acuity: Corrected Both Eyes Chart Acuity: 20/25   Able To Tolerate Visual Acuity: Yes        Assessment & Plan:   Johnny Kern is a 74 year old male who presents for a Medicare Assessment.     1. Encounter for annual health examination  Physical exam remarkable for decreased capacity to answer questions.  Frequently defers to wife.  Behavior consistent with Alzheimer's.  Advanced directives reviewed.  Encouraged healthy diet and regular exercise.    2. Diabetic eye exam (HCC)  Refer to ophthalmology for evaluation of  diabetic eye.  - Ophthalmology Referral - In Network    3. Type 2 diabetes mellitus without complication, without long-term current use of insulin (HCC)  Diabetes is controlled.  A1c 6.0.  We will change metformin over to  mg twice a day.  See if this helps some of the GI symptoms.    4. Essential hypertension  Well-controlled.  Continue current treatment.    5. Hypothyroidism, unspecified type  Recent normal TSH.  Continue current dose of thyroid supplementation.    6. Vitamin D deficiency  Recent vitamin D level was normal.  Continue current dose of vitamin D supplementation.    7. Alzheimer's dementia, unspecified dementia severity, unspecified timing of dementia onset, unspecified whether behavioral, psychotic, or mood disturbance or anxiety (HCC)  Still with persistent symptoms.  I am concerned based on his appearance today that his condition may be worsening.  Patient needs to follow-up with neurology.  Recent changes made in medications.  Continue current treatment for now.  Wife continues to be closely attentive and take good care of him.  Needs to monitor for worsening memory as well as any behavior that may be potentially harmful for the patient.  Discussed with the wife and the patient present in the room the likely need for additional help at home.  They need to start looking for additional help for the patient while he is in the household.  Also they want to start thinking about future accommodations if the dementia worsens.  - Neuro Referral - [facility] ([facility])    8. Teeth grinding  Stable.  Continue current treatment.    9. Recurrent major depressive disorder, remission status unspecified  Currently on escitalopram 5 mg a day.  Also taking Xanax as needed.  Continue current treatment.    10. Screening for prostate cancer  Check PSA.  - PSA Total, Screen; Future      Assessment & Plan      The patient indicates understanding of these issues and agrees to the plan.  Reinforced healthy diet,  lifestyle, and exercise.      Return in about 3 months (around 2025).     Sonny Gallagher MD, 2025     Supplementary Documentation:   General Health:  In the past six months, have you lost more than 10 pounds without trying?: 2 - No  Has your appetite been poor?: Yes  Type of Diet: Balanced, Diabetic  How does the patient maintain a good energy level?: Other  How would you describe your daily physical activity?: Light  How would you describe your current health state?: Fair  How do you maintain positive mental well-being?: Visiting Family  On a scale of 0 to 10, with 0 being no pain and 10 being severe pain, what is your pain level?: 1 - (Mild)  In the past six months, have you experienced urine leakage?: 0-No  At any time do you feel concerned for the safety/well-being of yourself and/or your children, in your home or elsewhere?: No  Have you had any immunizations at another office such as Influenza, Hepatitis B, Tetanus, or Pneumococcal?: No    Health Maintenance   Topic Date Due    Diabetes Care Foot Exam  10/30/2016    PSA  2024    COVID-19 Vaccine ( season) 2024    Annual Well Visit  2025    Diabetes Care Dilated Eye Exam  2025    Diabetes Care A1C  11/10/2025    Diabetes Care: GFR  05/10/2026    Colorectal Cancer Screening  10/06/2029    Influenza Vaccine  Completed    Annual Depression Screening  Completed    Fall Risk Screening (Annual)  Completed    Diabetes Care: Microalb/Creat Ratio (Annual)  Completed    Pneumococcal Vaccine: 50+ Years  Completed    Zoster Vaccines  Completed    Meningococcal B Vaccine  Aged Out          [1]   Social History  Tobacco Use   Smoking Status Former    Current packs/day: 0.00    Types: Cigarettes    Quit date: 2002    Years since quittin.4   Smokeless Tobacco Never

## 2025-05-13 DIAGNOSIS — G30.9 ALZHEIMER'S DEMENTIA WITH MOOD DISTURBANCE, UNSPECIFIED DEMENTIA SEVERITY, UNSPECIFIED TIMING OF DEMENTIA ONSET (HCC): ICD-10-CM

## 2025-05-13 DIAGNOSIS — F02.83 ALZHEIMER'S DEMENTIA WITH MOOD DISTURBANCE, UNSPECIFIED DEMENTIA SEVERITY, UNSPECIFIED TIMING OF DEMENTIA ONSET (HCC): ICD-10-CM

## 2025-05-13 RX ORDER — ALPRAZOLAM 0.25 MG
0.25 TABLET ORAL 2 TIMES DAILY PRN
Qty: 30 TABLET | Refills: 0 | Status: SHIPPED | OUTPATIENT
Start: 2025-05-13

## 2025-05-13 NOTE — TELEPHONE ENCOUNTER
Refused. Due to duplicate request.       Medication: ALPRAZolam 0.25 MG Oral Tab      Date of last refill: 04/14/2025 (#30/0)  [place]     Last office visit: 03/13/2025  Due back to clinic per last office note:  06/13/2025  Date next office visit scheduled:    Future Appointments   Date Time Provider Department Center   7/1/2025  1:40 PM Sheryl Cunningham MD [facility]   8/20/2025  3:20 PM Sonny Gallagher MD [facility]           Last OV note recommendation:    ASSESSMENT/PLAN:          ICD-10-CM     1. Alzheimer's dementia with mood disturbance, unspecified dementia severity, unspecified timing of dementia onset (HCC)  G30.9 mirtazapine 7.5 MG Oral Tab     F02.83 ALPRAZolam 0.25 MG Oral Tab       2. Cognitive and behavioral changes  R41.89       R46.89         3. Bruxism  F45.8                      Alzheimer's Dementia with mood and behavioral disturbance, moderate to severe   MRI brain w and wo shows global brain atrophy with predominant right temporal lobe atrophy   PET brain scan shows diminished activity in both temporal lobe and hippocampi consistent with Alzheimer's disease     Continue Memantine 10 mg daily (encourage compliance)      2. Major Depressive disorder and anxiety disorder  Had transient worsening probably due to medication non-compliance and family dynamics  Continue mirtazapine but reduce dose to 7.5 mg nightly as it can worsen bruxism  Not taking Escitalopram as recommended by Dr Angulo. Advise to follow with Dr Angulo     3. Bruxism, awake only. No sleep Bruxism reported  Start low dose Xanax 0.25 mg BID prn for anxiety/agitation  Use mouthguard regularly. Follow with your Dentist         Total 40 minutes spent with patient >50% of visit was spent in counseling and coordination of care

## 2025-05-13 NOTE — TELEPHONE ENCOUNTER
Medication: ALPRAZOLAM 0.25 MG Oral Tab      Date of last refill: 04/14/2025 (#30/0)  [place]     Last office visit: 03/13/2025  Due back to clinic per last office note:  06/13/2025  Date next office visit scheduled:    Future Appointments   Date Time Provider Department Center   7/1/2025  1:40 PM Sheryl Cunningham MD [facility]   8/20/2025  3:20 PM Sonny Gallagher MD [facility]           Last OV note recommendation:    ASSESSMENT/PLAN:          ICD-10-CM     1. Alzheimer's dementia with mood disturbance, unspecified dementia severity, unspecified timing of dementia onset (HCC)  G30.9 mirtazapine 7.5 MG Oral Tab     F02.83 ALPRAZolam 0.25 MG Oral Tab       2. Cognitive and behavioral changes  R41.89       R46.89         3. Bruxism  F45.8                      Alzheimer's Dementia with mood and behavioral disturbance, moderate to severe   MRI brain w and wo shows global brain atrophy with predominant right temporal lobe atrophy   PET brain scan shows diminished activity in both temporal lobe and hippocampi consistent with Alzheimer's disease     Continue Memantine 10 mg daily (encourage compliance)      2. Major Depressive disorder and anxiety disorder  Had transient worsening probably due to medication non-compliance and family dynamics  Continue mirtazapine but reduce dose to 7.5 mg nightly as it can worsen bruxism  Not taking Escitalopram as recommended by Dr Angulo. Advise to follow with Dr Angulo     3. Bruxism, awake only. No sleep Bruxism reported  Start low dose Xanax 0.25 mg BID prn for anxiety/agitation  Use mouthguard regularly. Follow with your Dentist         Total 40 minutes spent with patient >50% of visit was spent in counseling and coordination of care

## 2025-05-14 RX ORDER — ALPRAZOLAM 0.25 MG
0.25 TABLET ORAL 2 TIMES DAILY PRN
Qty: 30 TABLET | Refills: 0 | OUTPATIENT
Start: 2025-05-14

## 2025-05-14 NOTE — TELEPHONE ENCOUNTER
Duplicate    ALPRAZOLAM 0.25 MG Oral Tab 30 tablet 0 5/13/2025 --    Sig - Route: TAKE 1 TABLET(0.25 MG) BY MOUTH TWICE DAILY AS NEEDED - Oral    Sent to pharmacy as: ALPRAZolam 0.25 MG Oral Tablet (Xanax)    E-Prescribing Status: Receipt confirmed by pharmacy (5/13/2025  5:44 PM CDT

## 2025-05-23 ENCOUNTER — TELEPHONE (OUTPATIENT)
Dept: INTERNAL MEDICINE CLINIC | Facility: CLINIC | Age: 75
End: 2025-05-23

## 2025-05-23 NOTE — TELEPHONE ENCOUNTER
Patient spouse Sofia calling because office note is not available on [employer]. Upon chart review last office note not signed by physician yet. Patients wife states that  has been acting strange states that patient has been talking about death, being forgetful, speaking of hurting himself and wanting to die. Wife is fearful. Spoke to patient and completed [place] suicide severity scale. Provided wife with [employer] suicide assistance number. States that she and patient are out of town visiting daughter. Wife is expressing she is fearful to return home. Please advise.    Spent 25 minutes on the phone.

## 2025-06-04 ENCOUNTER — TELEPHONE (OUTPATIENT)
Dept: INTERNAL MEDICINE CLINIC | Facility: CLINIC | Age: 75
End: 2025-06-04

## 2025-06-04 DIAGNOSIS — H25.13 AGE-RELATED NUCLEAR CATARACT OF BOTH EYES: ICD-10-CM

## 2025-06-04 DIAGNOSIS — H52.4 PRESBYOPIA: ICD-10-CM

## 2025-06-04 DIAGNOSIS — H43.391 FLOATERS, RIGHT: ICD-10-CM

## 2025-06-04 DIAGNOSIS — E11.9 DIABETIC EYE EXAM (HCC): Primary | ICD-10-CM

## 2025-06-04 DIAGNOSIS — E11.9 TYPE 2 DIABETES MELLITUS WITHOUT COMPLICATION, WITHOUT LONG-TERM CURRENT USE OF INSULIN (HCC): ICD-10-CM

## 2025-06-04 DIAGNOSIS — Z01.00 DIABETIC EYE EXAM (HCC): Primary | ICD-10-CM

## 2025-06-04 NOTE — TELEPHONE ENCOUNTER
Patient wife  calling ( name and date of birth of patient verified )  is trying to make an Appointment made  at [facility] but they need the referral     Wife very upset  states that office is very rude     Informed patient wife will fax the referral now     This RN obtained the fax number and faxed the referral  to [phone] via Right fax

## 2025-06-05 NOTE — TELEPHONE ENCOUNTER
Order placed is changing to open status referral number [id number]  Referral placed in May was not external [facility] calling saying they need auth from insurance, not just order asking for referral    Please review and advise.   Wife in tears on the line, trying to get appt scheduled.

## 2025-06-06 ENCOUNTER — TELEPHONE (OUTPATIENT)
Age: 75
End: 2025-06-06

## 2025-06-06 NOTE — TELEPHONE ENCOUNTER
Hello,  I am reaching out from the [facility] department, following up on an order from your provider's office to assist in connecting you with resources for care. If you would like to discuss this further, please give us a call back at [phone], or for more immediate assistance you can contact our 24-hour help line at [phone] We look forward to hearing from you soon.

## 2025-06-13 ENCOUNTER — TELEPHONE (OUTPATIENT)
Age: 75
End: 2025-06-13

## 2025-06-13 NOTE — TELEPHONE ENCOUNTER
Ignacio Turner,    I am glad that we were able to connect today. Here are some psychiatry resources that may be a good fit for you. Based on their online profiles, they are in network with your current insurance, [employer] Gold Plus HMO. Please verify your insurance coverage with any providers that you may choose to call and schedule with directly. If you have any other questions, please give me a call at [phone]. If you need more immediate assistance, or assistance outside of business hours, please contact the [facility] 24/7 helpline at [phone] ([phone]).  [facility]-MD Karin Olson MD   [address], [address]  [place] [address]  [phone]    [facility]-Dr. Veronique Castrejon  [address]  [place] [address] [phone]    ESCO[facility] Doyle MD and Assoc-Lorelei Doyle MD  [address] [address], [address]  [place] [address]  [phone]    [facility]  [address]  [place] [address]  [phone]    Emily Vega  Behavioral Health Navigator   [facility]   24/7 Crisis Line [phone] ([phone])

## 2025-06-23 DIAGNOSIS — F02.83 ALZHEIMER'S DEMENTIA WITH MOOD DISTURBANCE, UNSPECIFIED DEMENTIA SEVERITY, UNSPECIFIED TIMING OF DEMENTIA ONSET (HCC): ICD-10-CM

## 2025-06-23 DIAGNOSIS — G30.9 ALZHEIMER'S DEMENTIA WITH MOOD DISTURBANCE, UNSPECIFIED DEMENTIA SEVERITY, UNSPECIFIED TIMING OF DEMENTIA ONSET (HCC): ICD-10-CM

## 2025-06-23 DIAGNOSIS — F32.A DEPRESSION, UNSPECIFIED DEPRESSION TYPE: Primary | ICD-10-CM

## 2025-06-25 NOTE — TELEPHONE ENCOUNTER
Medication: escitalopram 10 MG Oral Tab + ALPRAZolam 0.25 MG Oral Tab     Date of last refill: 06/03/2025 (#30/2) + 06/03/2025 (#30/0)  [place]     Last office visit: 06/03/2025  Due back to clinic per last office note:  6 months  Date next office visit scheduled:    Future Appointments   Date Time Provider Department Center   8/20/2025  3:20 PM Sonny Gallagher MD [facility]   8/21/2025  3:40 PM Sheryl Cunningham MD [facility] [place]           Last OV note recommendation:    ASSESSMENT/PLAN:          ICD-10-CM     1. Alzheimer's dementia with mood disturbance, unspecified dementia severity, unspecified timing of dementia onset (HCC)  G30.9 ALPRAZolam 0.25 MG Oral Tab     F02.83         2. Cognitive and behavioral changes  R41.89       R46.89         3. Depression, unspecified depression type  F32.A                      Alzheimer's Dementia with mood and behavioral disturbance, moderate to severe   MRI brain w and wo shows global brain atrophy with predominant right temporal lobe atrophy   PET brain scan shows diminished activity in both temporal lobe and hippocampi consistent with Alzheimer's disease     Continue Memantine 10 mg daily (encourage compliance)  Counseled on cognitive exercises, supervision and safety precuations      2. Major Depressive disorder and anxiety disorder  Had transient worsening probably due to medication non-compliance and family dynamics  Mirtazapine was weaned off as it can worsen bruxism and patient had severe bruxism  On Escitalopram, increase dose to 10 mg daily.   [facility]er to [facility]. Wife does not want to see Dr Angulo his former Psychiatrist     Discussed at the length with the patient's wife that if he continues to have suicidal ideations or plans bring him to the ED           3. Bruxism, awake only. No sleep Bruxism reported  Continue low dose Xanax 0.25 mg BID prn for anxiety/agitation  Use mouthguard  regularly. Follow with your Dentist         RTC in about 6 months     Sheryl Cunningham MD  Replaced by [facility] [place]

## 2025-06-26 RX ORDER — ALPRAZOLAM 0.25 MG
0.25 TABLET ORAL 2 TIMES DAILY PRN
Qty: 60 TABLET | Refills: 0 | Status: SHIPPED | OUTPATIENT
Start: 2025-06-26

## 2025-06-26 RX ORDER — ESCITALOPRAM OXALATE 10 MG/1
10 TABLET ORAL DAILY
Qty: 30 TABLET | Refills: 2 | Status: SHIPPED | OUTPATIENT
Start: 2025-06-26

## 2025-07-05 ENCOUNTER — PATIENT MESSAGE (OUTPATIENT)
Dept: NEUROLOGY | Facility: CLINIC | Age: 75
End: 2025-07-05

## 2025-07-16 NOTE — TELEPHONE ENCOUNTER
Spoke with patient's wife and informed her that insurance will not cover Botox for Bruxism.   Sofia states she is very concerned about Johnny, stating he sleeps all of the time, does not want to do anything and the teeth grinding is constant. She has not scheduled an appointment with a psychiatrist because [facility] just has nurse practitioners and physician assistants seeing patient and she only wants MD to see Johnny.  Discussed the importance of having him see a psychiatrist who would know how to treat his agitation that is causing the Bruxism.   Sofia insists on keeping appointment tomorrow and states she will also bring care giver. Requested she bring patient 15 minutes early (3:45).

## 2025-07-22 ENCOUNTER — TELEPHONE (OUTPATIENT)
Age: 75
End: 2025-07-22

## 2025-07-24 RX ORDER — ERGOCALCIFEROL 1.25 MG/1
50000 CAPSULE, LIQUID FILLED ORAL
Qty: 3 CAPSULE | Refills: 3 | Status: SHIPPED | OUTPATIENT
Start: 2025-07-24

## 2025-07-24 NOTE — TELEPHONE ENCOUNTER
For replies, please route to pool: [facility] CENTRAL REFILLS    Please review: medication fails/has no protocol attached.    Vitamin D last prescribed for patient 2 years ago (7/10/2023) - please advise if refilling this medication is appropriate.    Future Appointments   Date Time Provider Department Center   8/20/2025  3:20 PM Sonny Gallagher MD [facility]     Vitamin D   Component  Ref Range & Units (hover) 5/10/25 11:25 AM   Vitamin D, 25OH, Total 45.5     Per 5/12/25 office visit with Dr. Gallagher:  Vitamin D deficiency  Recent vitamin D level was normal.  Continue current dose of vitamin D supplementation.

## 2025-07-25 ENCOUNTER — MED REC SCAN ONLY (OUTPATIENT)
Dept: INTERNAL MEDICINE CLINIC | Facility: CLINIC | Age: 75
End: 2025-07-25

## 2025-08-11 ENCOUNTER — LAB ENCOUNTER (OUTPATIENT)
Dept: LAB | Age: 75
End: 2025-08-11
Attending: INTERNAL MEDICINE

## 2025-08-11 ENCOUNTER — NURSE TRIAGE (OUTPATIENT)
Dept: INTERNAL MEDICINE CLINIC | Facility: CLINIC | Age: 75
End: 2025-08-11

## 2025-08-11 DIAGNOSIS — R41.0 CONFUSION: ICD-10-CM

## 2025-08-11 DIAGNOSIS — E11.9 TYPE 2 DIABETES MELLITUS WITHOUT RETINOPATHY (HCC): ICD-10-CM

## 2025-08-11 DIAGNOSIS — E11.9 TYPE 2 DIABETES MELLITUS WITHOUT COMPLICATION, WITHOUT LONG-TERM CURRENT USE OF INSULIN (HCC): Primary | ICD-10-CM

## 2025-08-11 LAB
BASOPHILS # BLD AUTO: 0.02 X10(3) UL (ref 0–0.2)
BASOPHILS NFR BLD AUTO: 0.2 %
BILIRUB UR QL STRIP.AUTO: NEGATIVE
CLARITY UR REFRACT.AUTO: CLEAR
EOSINOPHIL # BLD AUTO: 0.31 X10(3) UL (ref 0–0.7)
EOSINOPHIL NFR BLD AUTO: 3.2 %
ERYTHROCYTE [DISTWIDTH] IN BLOOD BY AUTOMATED COUNT: 13.2 %
EST. AVERAGE GLUCOSE BLD GHB EST-MCNC: 148 MG/DL (ref 68–126)
GLUCOSE UR STRIP.AUTO-MCNC: >1000 MG/DL
HBA1C MFR BLD: 6.8 % (ref ?–5.7)
HCT VFR BLD AUTO: 36.6 % (ref 39–53)
HGB BLD-MCNC: 12.9 G/DL (ref 13–17.5)
IMM GRANULOCYTES # BLD AUTO: 0.02 X10(3) UL (ref 0–1)
IMM GRANULOCYTES NFR BLD: 0.2 %
KETONES UR STRIP.AUTO-MCNC: NEGATIVE MG/DL
LEUKOCYTE ESTERASE UR QL STRIP.AUTO: NEGATIVE
LYMPHOCYTES # BLD AUTO: 2.52 X10(3) UL (ref 1–4)
LYMPHOCYTES NFR BLD AUTO: 26 %
MCH RBC QN AUTO: 32.6 PG (ref 26–34)
MCHC RBC AUTO-ENTMCNC: 35.2 G/DL (ref 31–37)
MCV RBC AUTO: 92.4 FL (ref 80–100)
MONOCYTES # BLD AUTO: 0.89 X10(3) UL (ref 0.1–1)
MONOCYTES NFR BLD AUTO: 9.2 %
NEUTROPHILS # BLD AUTO: 5.94 X10 (3) UL (ref 1.5–7.7)
NEUTROPHILS # BLD AUTO: 5.94 X10(3) UL (ref 1.5–7.7)
NEUTROPHILS NFR BLD AUTO: 61.2 %
NITRITE UR QL STRIP.AUTO: NEGATIVE
PH UR STRIP.AUTO: 5.5 (ref 5–8)
PLATELET # BLD AUTO: 192 10(3)UL (ref 150–450)
PROT UR STRIP.AUTO-MCNC: NEGATIVE MG/DL
RBC # BLD AUTO: 3.96 X10(6)UL (ref 3.8–5.8)
RBC UR QL AUTO: NEGATIVE
SP GR UR STRIP.AUTO: 1.02 (ref 1–1.03)
UROBILINOGEN UR STRIP.AUTO-MCNC: NORMAL MG/DL
WBC # BLD AUTO: 9.7 X10(3) UL (ref 4–11)

## 2025-08-11 PROCEDURE — 85025 COMPLETE CBC W/AUTO DIFF WBC: CPT

## 2025-08-11 PROCEDURE — 81003 URINALYSIS AUTO W/O SCOPE: CPT

## 2025-08-11 PROCEDURE — 36415 COLL VENOUS BLD VENIPUNCTURE: CPT

## 2025-08-11 PROCEDURE — 83036 HEMOGLOBIN GLYCOSYLATED A1C: CPT

## 2025-08-12 ENCOUNTER — TELEPHONE (OUTPATIENT)
Age: 75
End: 2025-08-12

## 2025-08-13 ENCOUNTER — TELEPHONE (OUTPATIENT)
Dept: INTERNAL MEDICINE CLINIC | Facility: CLINIC | Age: 75
End: 2025-08-13

## 2025-08-19 DIAGNOSIS — G30.9 ALZHEIMER'S DEMENTIA WITH MOOD DISTURBANCE, UNSPECIFIED DEMENTIA SEVERITY, UNSPECIFIED TIMING OF DEMENTIA ONSET (HCC): ICD-10-CM

## 2025-08-19 DIAGNOSIS — F02.83 ALZHEIMER'S DEMENTIA WITH MOOD DISTURBANCE, UNSPECIFIED DEMENTIA SEVERITY, UNSPECIFIED TIMING OF DEMENTIA ONSET (HCC): ICD-10-CM

## 2025-08-19 DIAGNOSIS — F45.8 BRUXISM: ICD-10-CM

## 2025-08-19 RX ORDER — CYCLOBENZAPRINE HCL 5 MG
5 TABLET ORAL DAILY
Qty: 30 TABLET | Refills: 0 | Status: SHIPPED | OUTPATIENT
Start: 2025-08-19

## 2025-08-19 RX ORDER — ALPRAZOLAM 0.25 MG
0.25 TABLET ORAL 2 TIMES DAILY PRN
Qty: 60 TABLET | Refills: 0 | Status: SHIPPED | OUTPATIENT
Start: 2025-08-19

## 2025-08-20 ENCOUNTER — OFFICE VISIT (OUTPATIENT)
Dept: INTERNAL MEDICINE CLINIC | Facility: CLINIC | Age: 75
End: 2025-08-20

## 2025-08-20 VITALS
DIASTOLIC BLOOD PRESSURE: 56 MMHG | HEART RATE: 86 BPM | OXYGEN SATURATION: 98 % | BODY MASS INDEX: 20.47 KG/M2 | WEIGHT: 143 LBS | HEIGHT: 70 IN | TEMPERATURE: 98 F | SYSTOLIC BLOOD PRESSURE: 104 MMHG | RESPIRATION RATE: 18 BRPM

## 2025-08-20 DIAGNOSIS — G30.9 ALZHEIMER'S DEMENTIA, UNSPECIFIED DEMENTIA SEVERITY, UNSPECIFIED TIMING OF DEMENTIA ONSET, UNSPECIFIED WHETHER BEHAVIORAL, PSYCHOTIC, OR MOOD DISTURBANCE OR ANXIETY (HCC): ICD-10-CM

## 2025-08-20 DIAGNOSIS — F02.80 ALZHEIMER'S DEMENTIA, UNSPECIFIED DEMENTIA SEVERITY, UNSPECIFIED TIMING OF DEMENTIA ONSET, UNSPECIFIED WHETHER BEHAVIORAL, PSYCHOTIC, OR MOOD DISTURBANCE OR ANXIETY (HCC): ICD-10-CM

## 2025-08-20 DIAGNOSIS — E03.9 HYPOTHYROIDISM, UNSPECIFIED TYPE: ICD-10-CM

## 2025-08-20 DIAGNOSIS — E11.9 TYPE 2 DIABETES MELLITUS WITHOUT RETINOPATHY (HCC): Primary | ICD-10-CM

## 2025-08-20 DIAGNOSIS — F33.9 RECURRENT MAJOR DEPRESSIVE DISORDER, REMISSION STATUS UNSPECIFIED: ICD-10-CM

## 2025-08-20 DIAGNOSIS — F45.8 TEETH GRINDING: ICD-10-CM

## 2025-08-20 DIAGNOSIS — I10 ESSENTIAL HYPERTENSION: ICD-10-CM

## 2025-08-20 PROCEDURE — 3044F HG A1C LEVEL LT 7.0%: CPT | Performed by: INTERNAL MEDICINE

## 2025-08-20 PROCEDURE — 1160F RVW MEDS BY RX/DR IN RCRD: CPT | Performed by: INTERNAL MEDICINE

## 2025-08-20 PROCEDURE — 3008F BODY MASS INDEX DOCD: CPT | Performed by: INTERNAL MEDICINE

## 2025-08-20 PROCEDURE — 3078F DIAST BP <80 MM HG: CPT | Performed by: INTERNAL MEDICINE

## 2025-08-20 PROCEDURE — 1126F AMNT PAIN NOTED NONE PRSNT: CPT | Performed by: INTERNAL MEDICINE

## 2025-08-20 PROCEDURE — 1170F FXNL STATUS ASSESSED: CPT | Performed by: INTERNAL MEDICINE

## 2025-08-20 PROCEDURE — G2211 COMPLEX E/M VISIT ADD ON: HCPCS | Performed by: INTERNAL MEDICINE

## 2025-08-20 PROCEDURE — 3074F SYST BP LT 130 MM HG: CPT | Performed by: INTERNAL MEDICINE

## 2025-08-20 PROCEDURE — 1159F MED LIST DOCD IN RCRD: CPT | Performed by: INTERNAL MEDICINE

## 2025-08-20 PROCEDURE — 99214 OFFICE O/P EST MOD 30 MIN: CPT | Performed by: INTERNAL MEDICINE

## 2025-08-20 RX ORDER — CYCLOBENZAPRINE HCL 10 MG
10 TABLET ORAL 3 TIMES DAILY PRN
COMMUNITY

## (undated) DIAGNOSIS — Z12.5 SCREENING FOR PROSTATE CANCER: ICD-10-CM

## (undated) DIAGNOSIS — E11.9 TYPE 2 DIABETES MELLITUS WITHOUT COMPLICATION, WITHOUT LONG-TERM CURRENT USE OF INSULIN (HCC): Primary | ICD-10-CM

## (undated) NOTE — LETTER
3/18/2020              [address] [place] [address]. [facility] [address]                             To whom it may concern,        Nikko Mariia is under my medical care.   Because of his age and his chronic medical conditions

## (undated) NOTE — LETTER
25    Re: Johnny Kern  : 1950    To Whom It May Concern,  This patient is being followed by my office at [facility] for treatment of a neurologic condition.    There are no contraindications from Neurology standpoint for the above patient to have surgery done.  I prescribe Plavix for this patient.  It is ok to be off this medication for upcoming surgery for 3 days prior to surgery.  Patient should resume this anti-platelet therapy it as soon as surgery allows.  Please be advised that the risk of stroke is increased while off this medication, given @HIS@ stroke history and risk factors.  It is @HIS@ own decision to proceed as such.  Please note this is only a Neurological clearance, which does not replace necessary clearances from other specialties such as internal medicine, Hematology, Cardiology, or Pulmonary medicine.   Sincerely,    ***

## (undated) NOTE — LETTER
October 1, 2020    Faizan Lopez MD  [address]     Patient: Samy Powell   YOB: 1950   Date of Visit: 10/1/2020       Dear Dr. René Green MD:    Thank you for referring Mimi Stewart to me for eval • Cyproheptadine HCl 4 MG Oral Tab Take 4 mg by mouth 3 (three) times daily. • mirtazapine 7.5 MG Oral Tab Take by mouth 3 (three) times daily.        • OneTouch Delica Lancets 05Q Does not apply Misc USE BY INTRADERMAL ROUTE TWICE A  each 3   • Disc Good rim, Temporal crescent Good rim, Temporal crescent    C/D Ratio 0.3 0.3    Macula Normal no BDR Normal no BDR    Vessels Normal Normal    Periphery Normal Normal            Refraction     Wearing Rx       Sphere Cylinder Axis Add    Right +1.00

## (undated) NOTE — MR AVS SNAPSHOT
[address] [address]  [phone]               Thank you for choosing us for your health care visit with Dony Bills MD.  We are glad to serve you and happy to provide you with this summar office. At that time, you will be provided with any authorization numbers or be assured that none are required. You can then schedule your appointment. Failure to obtain required authorization numbers can create reimbursement difficulties for you.     [address]   [address] w/Device Kit   apply 1 by Misc. (Non-Drug; Combo Route) route, test by intradermal route twice a day           PREVIDENT 5000 SENSITIVE 1.1-5 % Pste   Generic drug:  Sod Fluoride-Potassium Nitrate   BRUSH BID AS DIRECTED           * N

## (undated) NOTE — LETTER
3/17/2020              [address] [place] [address]. [facility] [address]         To whom it may concern,      Agnes Bahena is under my medical care.   Because of his age and his chronic medical conditions, he is at high risk

## (undated) NOTE — LETTER
Department: [facility], [facility], [address], [place]  Phone: [phone]  Ordering Provider: Ana Acuna  Ordering Provider Address: [address] [place] [address]  [id number] [place] [address]  Ordering Provider Phone: [phone]  Ordering Provider Fax: [phone]

## (undated) NOTE — LETTER
6/29/2020              [address] [place] [address]. [facility] [address]       Department: [facility], [facility], [address] [place] [address],[address], [place]  Phone: [phone]  Ordering Provider: Ana Acuna  Ordering Provider Address: [address] [place] [address]

## (undated) NOTE — MR AVS SNAPSHOT
[facility] [place] [address] [place], [address] ~ [phone]                After Visit Summary   3/10/2017    Itz Haines    MRN: [id number]           Visit Information        Provider Department    3/10/2017 10:00 AM Marcy Donovan discharge instructions in [facility] by going to Visits < Admission Summaries. If you've been to the Emergency Department or your doctor's office, you can view your past visit information in [facility] by going to Visits < Visit Summaries. My[employer]se questions?

## (undated) NOTE — LETTER
2024      No Recipients     Patient: Johnny Kern   YOB: 1950   Date of Visit: 2024       Dear Dr. Awad Recipients:    Thank you for referring Johnny Kern to me for evaluation. Here is my assessment and plan of care:    Johnny Kern is a 73 year old male.    HPI:     HPI    Patient here for diabetic eye exam.  Patient denies any problems or changes with his eyes. Patient complaints of his left eye being red,wife says started 2- 3 weeks ago. Patient only did warm compress twice a day. Patient wife says he has memory loss.       Pt has been a diabetic for 25+ years       Pt's diabetes is currently controlled by pills  Pt checks BS every other day   Pt's last blood sugar was  93 on 4/15/24  Last HA1C was 6.3 on 24  Endocrinologist: none  Last edited by Archana Chester OT on 2024  3:16 PM.        Patient History:  Past Medical History:    Chronic headaches    Type II or unspecified type diabetes mellitus without mention of complication, not stated as uncontrolled       Surgical History: Johnny Kern has no past surgical history on file.    Family History   Problem Relation Age of Onset    Cancer Father     Cancer Mother         esophagus    Diabetes Neg     Glaucoma Neg     Macular degeneration Neg        Social History:   Social History     Socioeconomic History    Marital status:    Tobacco Use    Smoking status: Former     Current packs/day: 0.00     Types: Cigarettes     Quit date: 2002     Years since quittin.3    Smokeless tobacco: Never   Vaping Use    Vaping status: Never Used   Substance and Sexual Activity    Alcohol use: No    Drug use: No    Sexual activity: Not Currently     Partners: Female   Other Topics Concern    Caffeine Concern Yes     Comment: 1 cup tea daily    Exercise Yes     Comment: walking    Pt has a pacemaker No    Pt has a defibrillator No    Reaction to local anesthetic No   Social History Narrative    The patient does  not use an assistive device..      The patient does live in a home with stairs.       Medications:  Current Outpatient Medications   Medication Sig Dispense Refill    glipiZIDE ER 5 MG Oral Tablet 24 Hr Take 1 tablet (5 mg total) by mouth daily. 90 tablet 3    levothyroxine 25 MCG Oral Tab Take 1 tablet (25 mcg total) by mouth before breakfast. 90 tablet 3    lisinopril 5 MG Oral Tab Take 1 tablet (5 mg total) by mouth daily. 90 tablet 3    metFORMIN 500 MG Oral Tab Take 1 tablet (500 mg total) by mouth 2 (two) times daily with meals. 180 tablet 3    Blood Glucose Calibration (TRUE METRIX LEVEL 1) Low In Vitro Solution 1 kit by Other route as needed. Gently swirl before use. *do not shake*  Control Tests should be performed: For practice to ensure your testing technique is good. Occasionally as you use a vial of test strips. When opening a new vial of test strips. If results seem unusually high or low. If a vial has been left opened or exposed to extreme heat or cold, or humidity. Whenever a check on performance of the system is needed. If meter damage is suspected. 3 each 3    Alcohol Swabs (BD SWAB SINGLE USE REGULAR) Does not apply Pads Apply 1 Pad topically daily. Use a directed once daily 100 each 3    Glucose Blood (TRUE METRIX BLOOD GLUCOSE TEST) In Vitro Strip 1 strip by In Vitro route daily. 100 strip 3    Blood Glucose Monitoring Suppl (TRUE METRIX METER) w/Device Does not apply Kit 1 kit daily. 1 kit 0    TRUEplus Lancets 30G Does not apply Misc 1 Lancet by Finger stick route daily. 100 each 3    Lancet Device Does not apply Misc 1 Lancet by Finger stick route daily. 1 each 1    Lancets Does not apply Misc 1 Lancet daily. 100 each 3    indomethacin 50 MG Oral Cap Take 1 capsule (50 mg total) by mouth 3 (three) times daily with meals. As needed for gout flare and pain 30 capsule 1    ergocalciferol 1.25 MG (49707 UT) Oral Cap Take 1 capsule (50,000 Units total) by mouth every 30 (thirty) days. 3 capsule 3     mirtazapine 15 MG Oral Tab Take 1 tablet (15 mg total) by mouth nightly. 90 tablet 3       Allergies:  Allergies   Allergen Reactions    Penicillins UNKNOWN       ROS:     ROS    Positive for: Eyes  Last edited by Yulia Blue OT on 4/17/2024  2:40 PM.          PHYSICAL EXAM:     Base Eye Exam       Visual Acuity (Snellen - Linear)         Right Left    Dist cc 20/25 +1 20/25 +1    Near cc 20/25 20/25      Correction: Glasses              Tonometry (Icare, 3:02 PM)         Right Left    Pressure 17 16              Pupils         Pupils    Right PERRL    Left PERRL              Visual Fields         Left Right     Full Full              Extraocular Movement         Right Left     Full, Ortho Full, Ortho              Neuro/Psych       Oriented x3: Yes    Mood/Affect: Normal              Dilation       Both eyes: 1.0% Mydriacyl and 2.5% Og Synephrine @ 3:03 PM              Dilation #2       Both eyes: 1.0% Mydriacyl and 2.5% Og Synephrine @ 3:03 PM                  Slit Lamp and Fundus Exam       External Exam         Right Left    External Normal Normal              Slit Lamp Exam         Right Left    Lids/Lashes Meibomian gland dysfunction, 1+ Scurf Meibomian gland dysfunction, 1+ Scurf    Conjunctiva/Sclera Temp pinguecula, Ocular Melanosis Temp pinguecula    Cornea 3+arcus 3+arcus    Anterior Chamber Deep and quiet Deep and quiet    Iris Normal Normal    Lens 3+ Nuclear sclerosis 3+ Nuclear sclerosis, trace cortical    Vitreous Vitreous floaters Clear              Fundus Exam         Right Left    Disc Good rim, Temporal crescent Good rim, Temporal crescent    C/D Ratio 0.3 0.3    Macula Normal, no BDR Normal, no BDR    Vessels Normal Normal    Periphery Normal Normal                  Refraction       Wearing Rx         Sphere Cylinder Axis Add    Right [place] +0.75 070 +2.50    Left +0.25 +0.25 005 +2.50      Age: 1yr    Type: Progressive bifocal   Patient does not want refraction.             Final Rx          Sphere Cylinder Axis Add    Right [place] +0.75 070 +2.50    Left +0.25 +0.25 005 +2.50      Type: Progressive bifocal                     ASSESSMENT/PLAN:     Diagnoses and Plan:     Type 2 diabetes mellitus without retinopathy (HCC)  Diabetes type II: no background of retinopathy, no signs of neovascularization noted.  Discussed ocular and systemic benefits of blood sugar control.  Diagnosis and treatment discussed in detail with patient.    Will see patient in 1 year for a diabetic exam    Age-related nuclear cataract of both eyes  Discussed early cataracts with patient. Told patient that cataracts are age appropriate and they are not surgical at this time.  No treatment recommended at this time.     Copy of glasses Rx given    Floaters, right   There is no evidence of retinal pathology.  All signs and symptoms of retinal detachment/tears explained in detail.    Patient instructed to call the office if they experience increase in floaters, increase in flashes of light, loss of vision or curtain or veil effect.     No orders of the defined types were placed in this encounter.      Meds This Visit:  Requested Prescriptions      No prescriptions requested or ordered in this encounter        Follow up instructions:  Return in about 1 year (around 4/17/2025) for Diabetic eye exam.    4/17/2024  Scribed by: Camden Hamilton MD        If you have questions, please do not hesitate to call me. I look forward to following Johnny along with you.    Sincerely,        Camden Hamilton MD        CC:   No Recipients    Document electronically generated by: Camden Hamilton MD

## (undated) NOTE — LETTER
3/17/2021              [facility] [place]        [phone]         To whom it may concern:      Neeraj Tidwell is under my long-term medical care.   He was diagnosed in summer 2020 with severe debilitating depres by:   Fifi Servin

## (undated) NOTE — MR AVS SNAPSHOT
[address] [address]  [phone]               Thank you for choosing us for your health care visit with Dony Bills MD.  We are glad to serve you and happy to provide you with this summar lisinopril 10 MG Tabs   Take 1 tablet (10 mg total) by mouth once daily.    Commonly known as:  PRINIVIL,ZESTRIL           MetFORMIN HCl 1000 MG Tabs   TAKE 1 TABLET BY MOUTH TWICE A DAY WITH MORNING AND EVENING MEALS   Commonly known as:  GLUCOPHAGE Educational Information     Healthy Diet and Regular Exercise  [facility] [address] for making healthy food choices  -   Enjoy your food, but eat less. Fully enjoy your food when eating. Don’t eat while distracted and slow down.    Avoid

## (undated) NOTE — Clinical Note
Thanks for the referral. Symptoms consistent with major depression attack. No signs of dementia on MOCA testing.  Needs Psychiatry eval and will get MRI brain w and wo contrast for further evaluation of ? temporal lobe atrophy

## (undated) NOTE — LETTER
6/22/2020              Tammi Cheng [facility] [address]         To Whom It May Concern:      Jaime Luis is under my care. HE was seen in my office today. He is currently unable to work.  He is under [facility]

## (undated) NOTE — LETTER
July 16, 2019    Maria Esther Lechuga MD  [address]     Patient: Taz Prescott   YOB: 1950   Date of Visit: 7/16/2019       Dear Dr. Peter Fernández MD:    Thank you for referring Vincenathanael De La Torre to me for evalua lisinopril 10 MG Oral Tab Take 1 tablet (10 mg total) by mouth once daily. Disp: 90 tablet Rfl: 3   GlipiZIDE ER 5 MG Oral Tablet 24 Hr Take 1 tablet (5 mg total) by mouth 2 (two) times daily.  Disp: 180 tablet Rfl: 3   Blood Glucose Monitoring Suppl (ONETO Periphery Normal Normal            Refraction     Wearing Rx       Sphere Cylinder Axis Add    Right +1.00 +0.75 085 +2.25    Left +1.25 Sphere  +2.25    Type:  Progressive bifocal          Manifest Refraction       Sphere Cylinder [place] Dist [place] Add Near V